# Patient Record
Sex: MALE | Race: WHITE | HISPANIC OR LATINO
[De-identification: names, ages, dates, MRNs, and addresses within clinical notes are randomized per-mention and may not be internally consistent; named-entity substitution may affect disease eponyms.]

---

## 2017-07-24 PROBLEM — Z00.00 ENCOUNTER FOR PREVENTIVE HEALTH EXAMINATION: Status: ACTIVE | Noted: 2017-07-24

## 2017-07-31 ENCOUNTER — APPOINTMENT (OUTPATIENT)
Dept: MRI IMAGING | Facility: HOSPITAL | Age: 52
End: 2017-07-31

## 2017-08-02 ENCOUNTER — APPOINTMENT (OUTPATIENT)
Dept: MRI IMAGING | Facility: HOSPITAL | Age: 52
End: 2017-08-02

## 2018-01-12 ENCOUNTER — RECORD ABSTRACTING (OUTPATIENT)
Age: 53
End: 2018-01-12

## 2018-01-12 DIAGNOSIS — R26.0 ATAXIC GAIT: ICD-10-CM

## 2018-01-12 DIAGNOSIS — Z87.438 PERSONAL HISTORY OF OTHER DISEASES OF MALE GENITAL ORGANS: ICD-10-CM

## 2018-01-12 DIAGNOSIS — Z92.89 PERSONAL HISTORY OF OTHER MEDICAL TREATMENT: ICD-10-CM

## 2018-01-12 DIAGNOSIS — Z87.898 PERSONAL HISTORY OF OTHER SPECIFIED CONDITIONS: ICD-10-CM

## 2018-02-12 ENCOUNTER — APPOINTMENT (OUTPATIENT)
Dept: CARDIOLOGY | Facility: CLINIC | Age: 53
End: 2018-02-12

## 2018-03-20 ENCOUNTER — APPOINTMENT (OUTPATIENT)
Dept: CARDIOLOGY | Facility: CLINIC | Age: 53
End: 2018-03-20
Payer: MEDICAID

## 2018-03-20 VITALS
SYSTOLIC BLOOD PRESSURE: 124 MMHG | HEIGHT: 71 IN | BODY MASS INDEX: 29.96 KG/M2 | HEART RATE: 86 BPM | WEIGHT: 214 LBS | RESPIRATION RATE: 14 BRPM | DIASTOLIC BLOOD PRESSURE: 70 MMHG

## 2018-03-20 PROCEDURE — 99214 OFFICE O/P EST MOD 30 MIN: CPT

## 2018-03-20 PROCEDURE — 93000 ELECTROCARDIOGRAM COMPLETE: CPT

## 2018-04-26 ENCOUNTER — APPOINTMENT (OUTPATIENT)
Dept: ELECTROPHYSIOLOGY | Facility: CLINIC | Age: 53
End: 2018-04-26

## 2018-05-10 ENCOUNTER — APPOINTMENT (OUTPATIENT)
Dept: CARDIOLOGY | Facility: CLINIC | Age: 53
End: 2018-05-10

## 2018-06-27 ENCOUNTER — APPOINTMENT (OUTPATIENT)
Dept: NEUROLOGY | Facility: CLINIC | Age: 53
End: 2018-06-27
Payer: MEDICAID

## 2018-06-27 VITALS
SYSTOLIC BLOOD PRESSURE: 130 MMHG | HEIGHT: 71 IN | DIASTOLIC BLOOD PRESSURE: 88 MMHG | BODY MASS INDEX: 30.1 KG/M2 | WEIGHT: 215 LBS

## 2018-06-27 PROCEDURE — 99204 OFFICE O/P NEW MOD 45 MIN: CPT

## 2018-06-27 RX ORDER — MECLIZINE HYDROCHLORIDE 25 MG/1
TABLET ORAL
Refills: 0 | Status: COMPLETED | COMMUNITY
End: 2018-06-27

## 2018-06-27 RX ORDER — FAMOTIDINE 10 MG/1
TABLET, FILM COATED ORAL
Refills: 0 | Status: COMPLETED | COMMUNITY
End: 2018-06-27

## 2018-07-05 ENCOUNTER — APPOINTMENT (OUTPATIENT)
Dept: CARDIOLOGY | Facility: CLINIC | Age: 53
End: 2018-07-05
Payer: MEDICAID

## 2018-07-05 VITALS
HEART RATE: 69 BPM | BODY MASS INDEX: 30.1 KG/M2 | HEIGHT: 71 IN | DIASTOLIC BLOOD PRESSURE: 60 MMHG | WEIGHT: 215 LBS | SYSTOLIC BLOOD PRESSURE: 106 MMHG

## 2018-07-05 PROCEDURE — 99214 OFFICE O/P EST MOD 30 MIN: CPT

## 2018-07-05 PROCEDURE — 93000 ELECTROCARDIOGRAM COMPLETE: CPT

## 2018-08-16 ENCOUNTER — APPOINTMENT (OUTPATIENT)
Dept: ELECTROPHYSIOLOGY | Facility: CLINIC | Age: 53
End: 2018-08-16

## 2018-08-23 ENCOUNTER — APPOINTMENT (OUTPATIENT)
Dept: NEUROLOGY | Facility: CLINIC | Age: 53
End: 2018-08-23
Payer: MEDICAID

## 2018-08-23 VITALS
WEIGHT: 215 LBS | DIASTOLIC BLOOD PRESSURE: 70 MMHG | BODY MASS INDEX: 30.1 KG/M2 | SYSTOLIC BLOOD PRESSURE: 110 MMHG | HEIGHT: 71 IN

## 2018-08-23 PROCEDURE — 99213 OFFICE O/P EST LOW 20 MIN: CPT

## 2018-08-23 NOTE — DATA REVIEWED
[de-identified] : Brain MRI was performed on 6/18/18 at [facility].\par There were some scattered foci of nonspecific gliosis which were unchanged from a prior study in December of 2017.\par  [de-identified] : EEG was performed in the hospital on 6/18/18.\par The study demonstrated some focal slowing over the left anterior temporal region. There was also spike and wave activity in the left frontotemporal region.\par

## 2018-08-23 NOTE — CONSULT LETTER
[Dear  ___] : Dear  [unfilled], [Courtesy Letter:] : I had the pleasure of seeing your patient, [unfilled], in my office today. [Please see my note below.] : Please see my note below. [Consult Closing:] : Thank you very much for allowing me to participate in the care of this patient.  If you have any questions, please do not hesitate to contact me. [Sincerely,] : Sincerely, [FreeTextEntry3] : Dakota Chawla MD.
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
## 2018-08-23 NOTE — PHYSICAL EXAM
[General Appearance - Alert] : alert [Oriented To Time, Place, And Person] : oriented to person, place, and time [Memory Recent] : recent memory was not impaired [Memory Remote] : remote memory was not impaired [Cranial Nerves Optic (II)] : visual acuity intact bilaterally,  visual fields full to confrontation, pupils equal round and reactive to light [Cranial Nerves Oculomotor (III)] : extraocular motion intact [Cranial Nerves Trigeminal (V)] : facial sensation intact symmetrically [Cranial Nerves Facial (VII)] : face symmetrical [Cranial Nerves Vestibulocochlear (VIII)] : hearing was intact bilaterally [Cranial Nerves Glossopharyngeal (IX)] : tongue and palate midline [Cranial Nerves Accessory (XI - Cranial And Spinal)] : head turning and shoulder shrug symmetric [Cranial Nerves Hypoglossal (XII)] : there was no tongue deviation with protrusion [Motor Tone] : muscle tone was normal in all four extremities [Motor Strength] : muscle strength was normal in all four extremities [Sensation Tactile Decrease] : light touch was intact [Sensation Pain / Temperature Decrease] : pain and temperature was intact [Sensation Vibration Decrease] : vibration was intact [Abnormal Walk] : normal gait [1+] : Patella left 1+ [Aphasia] : no dysphasia/aphasia [Romberg's Sign] : Romberg's sign was negtive [Coordination - Dysmetria Impaired Finger-to-Nose Bilateral] : not present [Plantar Reflex Right Only] : normal on the right [Plantar Reflex Left Only] : normal on the left

## 2018-08-23 NOTE — ASSESSMENT
[FreeTextEntry1] : This is a 53-year-old man who had an episode suggestive of seizure. \par EEG demonstrated a focal spike formation. This was left anterior temporal.\par \par I would agree with maintaining him on antiepileptic medications. I again explained that if he should have further seizures then we may need to change him to another agent.\par \par I will see him back in 3 months.\par \par I have again explained the [place] driving regulations. I have again advised him not to drive.

## 2018-08-31 PROBLEM — Z00.00 ENCOUNTER FOR PREVENTIVE HEALTH EXAMINATION: Noted: 2018-08-31

## 2018-09-20 ENCOUNTER — APPOINTMENT (OUTPATIENT)
Dept: CARDIOLOGY | Facility: CLINIC | Age: 53
End: 2018-09-20

## 2018-10-02 ENCOUNTER — APPOINTMENT (OUTPATIENT)
Dept: CARDIOLOGY | Facility: CLINIC | Age: 53
End: 2018-10-02
Payer: MEDICAID

## 2018-10-02 VITALS
BODY MASS INDEX: 32.06 KG/M2 | SYSTOLIC BLOOD PRESSURE: 114 MMHG | HEIGHT: 71 IN | RESPIRATION RATE: 12 BRPM | HEART RATE: 62 BPM | DIASTOLIC BLOOD PRESSURE: 70 MMHG | WEIGHT: 229 LBS

## 2018-10-02 DIAGNOSIS — Z82.49 FAMILY HISTORY OF ISCHEMIC HEART DISEASE AND OTHER DISEASES OF THE CIRCULATORY SYSTEM: ICD-10-CM

## 2018-10-02 PROCEDURE — 99214 OFFICE O/P EST MOD 30 MIN: CPT

## 2018-10-02 PROCEDURE — 93000 ELECTROCARDIOGRAM COMPLETE: CPT

## 2018-10-10 ENCOUNTER — APPOINTMENT (OUTPATIENT)
Dept: ELECTROPHYSIOLOGY | Facility: CLINIC | Age: 53
End: 2018-10-10
Payer: MEDICAID

## 2018-10-10 ENCOUNTER — APPOINTMENT (OUTPATIENT)
Dept: ELECTROPHYSIOLOGY | Facility: CLINIC | Age: 53
End: 2018-10-10

## 2018-10-10 VITALS
WEIGHT: 277 LBS | DIASTOLIC BLOOD PRESSURE: 77 MMHG | BODY MASS INDEX: 38.78 KG/M2 | HEIGHT: 71 IN | SYSTOLIC BLOOD PRESSURE: 121 MMHG | OXYGEN SATURATION: 97 % | HEART RATE: 71 BPM

## 2018-10-10 DIAGNOSIS — Z56.0 UNEMPLOYMENT, UNSPECIFIED: ICD-10-CM

## 2018-10-10 PROCEDURE — 93000 ELECTROCARDIOGRAM COMPLETE: CPT

## 2018-10-10 RX ORDER — INSULIN ISOPHANE,PORK PURE 100/ML
VIAL (ML) SUBCUTANEOUS
Refills: 0 | Status: DISCONTINUED | COMMUNITY
End: 2018-10-10

## 2018-10-10 SDOH — ECONOMIC STABILITY - INCOME SECURITY: UNEMPLOYMENT, UNSPECIFIED: Z56.0

## 2018-11-26 ENCOUNTER — APPOINTMENT (OUTPATIENT)
Dept: NEUROLOGY | Facility: CLINIC | Age: 53
End: 2018-11-26
Payer: MEDICAID

## 2018-11-26 VITALS
SYSTOLIC BLOOD PRESSURE: 145 MMHG | HEIGHT: 71 IN | BODY MASS INDEX: 38.78 KG/M2 | WEIGHT: 277 LBS | DIASTOLIC BLOOD PRESSURE: 90 MMHG

## 2018-11-26 PROCEDURE — 99213 OFFICE O/P EST LOW 20 MIN: CPT

## 2018-11-26 NOTE — PHYSICAL EXAM
[General Appearance - Alert] : alert [Oriented To Time, Place, And Person] : oriented to person, place, and time [Memory Recent] : recent memory was not impaired [Memory Remote] : remote memory was not impaired [Cranial Nerves Optic (II)] : visual acuity intact bilaterally,  visual fields full to confrontation, pupils equal round and reactive to light [Cranial Nerves Oculomotor (III)] : extraocular motion intact [Cranial Nerves Trigeminal (V)] : facial sensation intact symmetrically [Cranial Nerves Facial (VII)] : face symmetrical [Cranial Nerves Vestibulocochlear (VIII)] : hearing was intact bilaterally [Cranial Nerves Glossopharyngeal (IX)] : tongue and palate midline [Cranial Nerves Accessory (XI - Cranial And Spinal)] : head turning and shoulder shrug symmetric [Cranial Nerves Hypoglossal (XII)] : there was no tongue deviation with protrusion [Motor Tone] : muscle tone was normal in all four extremities [Motor Strength] : muscle strength was normal in all four extremities [Sensation Tactile Decrease] : light touch was intact [Sensation Pain / Temperature Decrease] : pain and temperature was intact [Sensation Vibration Decrease] : vibration was intact [Abnormal Walk] : normal gait [1+] : Patella left 1+ [PERRL With Normal Accommodation] : pupils were equal in size, round, reactive to light, with normal accommodation [Optic Disc Abnormality] : the optic disc were normal in size and color [Edema] : there was no peripheral edema [Involuntary Movements] : no involuntary movements were seen [Dysarthria] : no dysarthria [Aphasia] : no dysphasia/aphasia [Romberg's Sign] : Romberg's sign was negtive [Coordination - Dysmetria Impaired Finger-to-Nose Bilateral] : not present [Plantar Reflex Right Only] : normal on the right [Plantar Reflex Left Only] : normal on the left

## 2018-11-26 NOTE — ASSESSMENT
[FreeTextEntry1] : This is a 53-year-old man who had an episode suggestive of seizure in June of 2018. \par EEG demonstrated a focal spike formation. This was left anterior temporal.\par \par I would agree with maintaining him on antiepileptic medications. I again explained that if he should have further seizures then we may need to change him to another agent.\par \par I will see him back in 4 months.\par \par I have again explained the [place] regulations. I have again advised him not to drive.
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
## 2018-11-26 NOTE — DATA REVIEWED
[de-identified] : Brain MRI was performed on 6/18/18 at [facility].\par There were some scattered foci of nonspecific gliosis which were unchanged from a prior study in December of 2017.\par  [de-identified] : EEG was performed in the hospital on 6/18/18.\par The study demonstrated some focal slowing over the left anterior temporal region. There was also spike and wave activity in the left frontotemporal region.\par

## 2018-12-07 ENCOUNTER — OUTPATIENT (OUTPATIENT)
Dept: OUTPATIENT SERVICES | Facility: HOSPITAL | Age: 53
LOS: 1 days | Discharge: HOME | End: 2018-12-07
Payer: COMMERCIAL

## 2018-12-07 ENCOUNTER — TRANSCRIPTION ENCOUNTER (OUTPATIENT)
Age: 53
End: 2018-12-07

## 2018-12-07 VITALS
OXYGEN SATURATION: 98 % | HEART RATE: 66 BPM | RESPIRATION RATE: 20 BRPM | SYSTOLIC BLOOD PRESSURE: 164 MMHG | DIASTOLIC BLOOD PRESSURE: 77 MMHG

## 2018-12-07 VITALS
SYSTOLIC BLOOD PRESSURE: 138 MMHG | DIASTOLIC BLOOD PRESSURE: 70 MMHG | HEART RATE: 70 BPM | TEMPERATURE: 98 F | OXYGEN SATURATION: 99 % | RESPIRATION RATE: 18 BRPM

## 2018-12-07 DIAGNOSIS — I42.2 OTHER HYPERTROPHIC CARDIOMYOPATHY: ICD-10-CM

## 2018-12-07 PROCEDURE — 33282: CPT

## 2018-12-07 PROCEDURE — C1764: CPT

## 2018-12-07 RX ORDER — INSULIN DEGLUDEC 100 U/ML
42 INJECTION, SOLUTION SUBCUTANEOUS
Qty: 0 | Refills: 0 | COMMUNITY

## 2018-12-07 RX ORDER — CEPHALEXIN 500 MG
500 CAPSULE ORAL ONCE
Qty: 0 | Refills: 0 | Status: COMPLETED | OUTPATIENT
Start: 2018-12-07 | End: 2018-12-07

## 2018-12-07 RX ORDER — INSULIN ASPART 100 [IU]/ML
0 INJECTION, SOLUTION SUBCUTANEOUS
Qty: 0 | Refills: 0 | COMMUNITY

## 2018-12-07 RX ORDER — INSULIN DEGLUDEC 100 U/ML
0 INJECTION, SOLUTION SUBCUTANEOUS
Qty: 0 | Refills: 0 | COMMUNITY

## 2018-12-07 RX ADMIN — Medication 500 MILLIGRAM(S): at 08:12

## 2018-12-07 NOTE — H&P PST ADULT - RS GEN PE MLT RESP DETAILS PC
good air movement/clear to auscultation bilaterally/respirations non-labored/airway patent/breath sounds equal/no chest wall tenderness

## 2018-12-07 NOTE — DISCHARGE NOTE ADULT - CARE PLAN
Principal Discharge DX:	Status post placement of implantable loop recorder  Goal:	s/p Medtronic loop recorder implant  Assessment and plan of treatment:	Loop Recorder Incision Care:     - Remove the plastic and gauze dressing after 24 hours.   - Do not touch the incision until it is completely healed.   - There is Dermabond (skin glue) on your incision, which will start to flake off on its own over the next 2-3 weeks. Do not pick at or peal off the Dermabond.   - Do not apply soaps, creams, lotions, ointments or powders to the incision until it is completely healed.  - You should call the doctor if you notice redness, drainage, swelling, increased tenderness, hot sensation around the  incision, bleeding or incision edges pulling apart.

## 2018-12-07 NOTE — DISCHARGE NOTE ADULT - PLAN OF CARE
s/p MedVakast loop recorder implant Loop Recorder Incision Care:     - Remove the plastic and gauze dressing after 24 hours.   - Do not touch the incision until it is completely healed.   - There is Dermabond (skin glue) on your incision, which will start to flake off on its own over the next 2-3 weeks. Do not pick at or peal off the Dermabond.   - Do not apply soaps, creams, lotions, ointments or powders to the incision until it is completely healed.  - You should call the doctor if you notice redness, drainage, swelling, increased tenderness, hot sensation around the  incision, bleeding or incision edges pulling apart.

## 2018-12-07 NOTE — DISCHARGE NOTE ADULT - CARE PROVIDER_API CALL
Lisandro Johansen (MD; MPH), Cardiac Electrophysiology; Cardiovascular Disease; Internal Medicine  [address] [place] [address] [id number]  Phone: [phone]  Fax: [phone]

## 2018-12-07 NOTE — H&P PST ADULT - NSSUBSTANCEUSE_GEN_ALL_CORE_SD
[address] [place] [address] [address]  [phone]      Cardiology Progress Note      7/27/2017 3PM    Admit Date: 7/24/2017    Admit Diagnosis:   CVA (cerebral vascular accident) [facility])    Subjective:     Jihan Zaman is a 82 y.o. male with PMH HLD, DM, HTN, CKD, PAF, sHF, SSS s/p pacemaker, SVT s/p ablation, subdural hematoma who was admitted for CVA (cerebral vascular accident) ([address] Overnight events:  -remains in rapid a-fib; decreasing oxygen saturations  -labs steady   -Mr. Sarabjit Kelly has his eyes open today, but is not following commands or speaking.       Visit Vitals    /90    Pulse (!) 122    Temp 97.7 °F (36.5 °C)    Resp 24    Ht 5' 9\" (1.753 m)    Wt 65.5 kg (144 lb 6.4 oz)    SpO2 (!) 79%    BMI 21.32 kg/m2       Current Facility-Administered Medications   Medication Dose Route Frequency    furosemide (LASIX) injection 80 mg  80 mg IntraVENous Q12H    dextrose 5 % - 0.45% NaCl infusion  25 mL/hr IntraVENous CONTINUOUS    amiodarone (CORDARONE) 300 mg in dextrose 5% 250 mL infusion  1 mg/min IntraVENous CONTINUOUS    Followed by   Paco Prior amiodarone (CORDARONE) 300 mg in dextrose 5% 250 mL infusion  0.5 mg/min IntraVENous CONTINUOUS    aspirin (ASA) suppository 300 mg  300 mg Rectal DAILY    piperacillin-tazobactam (ZOSYN) 4.5 g in 0.9% sodium chloride (MBP/ADV) 100 mL  4.5 g IntraVENous Q12H    haloperidol lactate (HALDOL) injection 4 mg  4 mg IntraVENous Q4H PRN    labetalol (NORMODYNE;TRANDATE) injection 10 mg  10 mg IntraVENous Q2H PRN    albuterol-ipratropium (DUO-NEB) 2.5 MG-0.5 MG/3 ML  3 mL Nebulization Q2H PRN    glucose chewable tablet 16 g  4 Tab Oral PRN    glucagon (GLUCAGEN) injection 1 mg  1 mg IntraMUSCular PRN    sodium chloride (NS) flush 5-10 mL  5-10 mL IntraVENous Q8H    sodium chloride (NS) flush 5-10 mL  5-10 mL IntraVENous PRN    sodium chloride (NS) flush 5-10 mL  5-10 mL IntraVENous PRN    dextrose 10% infusion 125-250 mL  125-250 mL IntraVENous PRN    mupirocin (BACTROBAN) 2 % ointment   Both Nostrils BID    albuterol-ipratropium (DUO-NEB) 2.5 MG-0.5 MG/3 ML  3 mL Nebulization QID RT    insulin lispro (HUMALOG) injection   SubCUTAneous Q6H    sodium chloride (NS) flush 5-10 mL  5-10 mL IntraVENous PRN    sodium chloride (NS) flush 5 mL  5 mL IntraVENous PRN       Objective:      Physical Exam:  General: elderly AAM resting in bed in NAD with eyes open, but not responding to questions or commands appears in distress  Heart: RRR, no m/S3/JVD  Lungs: coarse rhonchi bilaterally;  Labored respirations   Abdomen: Soft, +BS, NTND   Extremities: LE js +DP/PT, no edema to BLE. Trace edema LUE   Neurologic: minimal response. Moves LUE and LLE spontaneously. No movement noted to RUE. Skin:  Warm and dry.     Data Review:   Recent Labs      07/26/17   0444  07/24/17 2011   WBC  14.7*  7.4   HGB  13.2  13.3   HCT  41.2  41.4   PLT  148*  182     Recent Labs      07/27/17   0432  07/26/17   0444  07/24/17 2011   NA  143  144  140   K  3.5  3.8  3.7   CL  112*  110*  104   CO2  19*  23  29   GLU  83  90  99   BUN  43*  33*  30*   CREA  2.29*  2.34*  2.10*   CA  8.5  8.4*  8.7   PHOS   --   3.4   --    ALB   --    --   3.3*   TBILI   --    --   0.4   SGOT   --    --   41*   ALT   --    --   43   INR   --    --   1.1       Recent Labs      07/24/17 2011   TROIQ  0.09*         Intake/Output Summary (Last 24 hours) at 07/27/17 1537  Last data filed at 07/27/17 1456   Gross per 24 hour   Intake          2362.92 ml   Output              445 ml   Net          1917.92 ml        Telemetry: rapid a-fib; Some pacing overnight. ? Bursts of flutter  ECG: AV pacing with ? PVCs  CXRAY: (7/24) no acute process                (7/25) \"New mild pulmonary edema with right pleural effusion. \"  CT head: (7/26): \"Evolution of large left anterior and middle cerebral territory  infarcts. No hemorrhagic transformation.  Mild mass effect on the ventricular  system without midline shift. No new areas of infarction. \"    Assessment:     Active Problems:    PAF (paroxysmal atrial fibrillation) ([facility] (8/6/2013)      CVA (cerebral vascular accident) ([facility] (7/24/2017)      Aspiration into airway (7/25/2017)      Weakness (7/26/2017)      Debility (7/26/2017)      Counseling regarding advanced care planning and goals of care (7/26/2017)        Plan:     PAF: with pacemaker. Remains in rapid a-fib with rates up to 150s this morning. ? Bursts of flutter. Pacemaker interrogation showed a -fib from 7/17 until 7/23. TPA would have dissolved any remaining clots. Interrogation showed no signs of WCT. Was not started on [address] [place] [address] Road out-patient due to history of subdural and advanced age/fall risk. CHADSVASC=7. HASBLED=3.    · Repeated amio bolus and higher rate gtt this morning to help with rate control. · Patient's labored respirations and restlessness likely triggering faster heart rates  · ASA supp. x 1 week per neuro rec. Then he can be AC. · BP and HF meds on hold for hypotension  · Aspiration per hospitalist and intensivist      Palliative meeting yesterday noted. Patient with very poor prognosis.         Frank Cantu NP  DNP, RN, AGACNP-BC never used

## 2018-12-07 NOTE — H&P PST ADULT - NS PRO FEM  PAP SMEARS 3YRS
[facility] EMERGENCY DEPT 
[address] [facility] [address] [id number] 
[phone] Work/School Note Date: 9/4/2018 To Whom It May concern: 
 
Camilla Land was seen and treated today in the emergency room by the following provider(s): 
Attending Provider: Viktor Centeno MD.   
 
Camilla Land may return to work on 9/5/2018.  
 
Sincerely, 
 
 
 
 
Viktor Centeno MD 
 
 
 

not applicable (Male)

## 2018-12-07 NOTE — DISCHARGE NOTE ADULT - HOSPITAL COURSE
53 year old male patient with a history of seizure disorder, HCM( IVS 1.5cm) , DM-2, HTN, LVOT obstruction, ataxic gait. Patient now s/p Medtronic loop recorder implantation.

## 2018-12-07 NOTE — H&P PST ADULT - HISTORY OF PRESENT ILLNESS
53 year old male patient with a history of seizure disorder, HCM( IVS 1.5cm) , DM-2, HTN, LVOT obstruction, ataxic gait. Patient referred for loop recorder implantation for long-term arrhythmia surveillance including occult AF and NSVT, Patient denies life-long syncope or FH of SCD.

## 2018-12-07 NOTE — DISCHARGE NOTE ADULT - CARE PROVIDERS DIRECT ADDRESSES
[email or web address]

## 2018-12-07 NOTE — DISCHARGE NOTE ADULT - PATIENT PORTAL LINK FT
You can access the [email or web address], offered by [facility], by registering with the following website: [email or web address]

## 2018-12-07 NOTE — DISCHARGE NOTE ADULT - MEDICATION SUMMARY - MEDICATIONS TO TAKE
I will START or STAY ON the medications listed below when I get home from the hospital:    aspirin 81 mg oral tablet  -- 1 tab(s) by mouth once a day  -- Indication: For CAD     Motrin 600 mg oral tablet  -- 1 tab(s) by mouth every 6 hours, As Needed  -- Indication: For pain    divalproex sodium 500 mg oral delayed release tablet  -- 1 tab(s) by mouth 2 times a day  -- Indication: For CNS    Tresiba FlexTouch 100 units/mL subcutaneous solution  -- 42 unit(s) subcutaneous once a day (at bedtime)  -- Indication: For DM    NovoLOG FlexPen 100 units/mL injectable solution  -- injectable 3 times a day  -- Indication: For DM    atorvastatin 10 mg oral tablet  -- 1 tab(s) by mouth once a day (at bedtime)  -- Indication: For HLD    Metoprolol Tartrate 50 mg oral tablet  -- 1 tab(s) by mouth 2 times a day  -- Indication: For HTN

## 2018-12-07 NOTE — H&P PST ADULT - ASSESSMENT
53 year old male patient with a history of seizure disorder, HCM( IVS 1.5cm) , DM-2, HTN, LVOT obstruction, ataxic gait. Patient referred for loop recorder implantation.

## 2018-12-12 DIAGNOSIS — R55 SYNCOPE AND COLLAPSE: ICD-10-CM

## 2018-12-26 ENCOUNTER — APPOINTMENT (OUTPATIENT)
Dept: ELECTROPHYSIOLOGY | Facility: CLINIC | Age: 53
End: 2018-12-26
Payer: MEDICAID

## 2018-12-26 VITALS
DIASTOLIC BLOOD PRESSURE: 93 MMHG | OXYGEN SATURATION: 95 % | BODY MASS INDEX: 29.4 KG/M2 | HEART RATE: 87 BPM | WEIGHT: 210 LBS | SYSTOLIC BLOOD PRESSURE: 146 MMHG | HEIGHT: 71 IN

## 2018-12-26 PROCEDURE — 99024 POSTOP FOLLOW-UP VISIT: CPT

## 2018-12-26 PROCEDURE — 93285 PRGRMG DEV EVAL SCRMS IP: CPT

## 2018-12-26 NOTE — PHYSICAL EXAM
[Clean] : clean [Dry] : dry [Well-Healed] : well-healed [Erythema] : not erythematous [Warm] : not warm [Tender] : not tender [Indurated] : not indurated [FreeTextEntry1] : parasternal

## 2018-12-26 NOTE — DISCUSSION/SUMMARY
[Patient] : the patient [FreeTextEntry1] : \par Implant site is healing well and WNL\par No events.  NO AF, alessandro or tachyarrhythmias noted\par \par Home transmitter and monitoring process reviewed at length\par Cardiology f/up with Dr. Tran\par Will continue to monitor remotely\par EP f/up based on ILR findings or as requested by Dr. Tran\par \par Dolly Castro PAC

## 2018-12-26 NOTE — PROCEDURE
[No] : not [NSR] : normal sinus rhythm [See Device Printout] : See device printout [Medtronic] : Medtronic [None] : none [de-identified] : Reveal LINQ [de-identified] : [id number] [de-identified] : 12/7/2018 [de-identified] : NO events

## 2018-12-26 NOTE — HISTORY OF PRESENT ILLNESS
[de-identified] : 54 y/o M with Seizure disorder, HCM( IVS 1.5cm) , DM-2, HTN, LVOT obstruction , Ataxic gait. patient referred for iLR for long-term arrhythmia surveillance including occult AF and NSVT .\par Patient denies life-long syncope or FH of SCD.\par \par Presents for post implant device and wound check.\par Denies recent symptoms including syncope, dizziness, or palpitations.\par No recurrent seizure activity since ILR implant

## 2019-01-07 ENCOUNTER — APPOINTMENT (OUTPATIENT)
Dept: ELECTROPHYSIOLOGY | Facility: CLINIC | Age: 54
End: 2019-01-07
Payer: MEDICAID

## 2019-01-07 PROCEDURE — 93299: CPT

## 2019-01-07 PROCEDURE — 93298 REM INTERROG DEV EVAL SCRMS: CPT

## 2019-01-10 ENCOUNTER — APPOINTMENT (OUTPATIENT)
Dept: CARDIOLOGY | Facility: CLINIC | Age: 54
End: 2019-01-10
Payer: MEDICAID

## 2019-01-10 VITALS
SYSTOLIC BLOOD PRESSURE: 147 MMHG | HEIGHT: 71 IN | DIASTOLIC BLOOD PRESSURE: 89 MMHG | RESPIRATION RATE: 14 BRPM | HEART RATE: 86 BPM | WEIGHT: 230 LBS | BODY MASS INDEX: 32.2 KG/M2 | OXYGEN SATURATION: 99 %

## 2019-01-10 VITALS — SYSTOLIC BLOOD PRESSURE: 136 MMHG | DIASTOLIC BLOOD PRESSURE: 80 MMHG

## 2019-01-10 PROCEDURE — 93000 ELECTROCARDIOGRAM COMPLETE: CPT

## 2019-01-10 PROCEDURE — 99214 OFFICE O/P EST MOD 30 MIN: CPT

## 2019-01-10 RX ORDER — METOPROLOL TARTRATE 50 MG/1
50 TABLET, FILM COATED ORAL
Qty: 180 | Refills: 1 | Status: DISCONTINUED | COMMUNITY
End: 2019-01-10

## 2019-01-10 NOTE — HISTORY OF PRESENT ILLNESS
[FreeTextEntry1] : Patient comes back to the office today offering no new complaints. He had his loop recorder placed without issue and there have been no events. He tells me that he has been taking metoprolol at 75 mg b.i.d. since the summer. His sister here today is more attuned with his medication regimen. Patient denies chest pain, shortness of breath, palpitations, orthopnea, presyncope, syncope.

## 2019-01-10 NOTE — PHYSICAL EXAM
[General Appearance - In No Acute Distress] : no acute distress [Normal Conjunctiva] : the conjunctiva exhibited no abnormalities [Normal Oral Mucosa] : normal oral mucosa [Normal Oropharynx] : normal oropharynx [Auscultation Breath Sounds / Voice Sounds] : lungs were clear to auscultation bilaterally [Normal Rate] : normal [Rhythm Regular] : regular [Normal S1] : normal S1 [Normal S2] : normal S2 [S4] : an S4 was heard [III] : a grade 3 [Abdomen Soft] : soft [Abdomen Tenderness] : non-tender [Abnormal Walk] : normal gait [Nail Clubbing] : no clubbing of the fingernails [Cyanosis, Localized] : no localized cyanosis [Skin Color & Pigmentation] : normal skin color and pigmentation [Oriented To Time, Place, And Person] : oriented to person, place, and time [Affect] : the affect was normal [FreeTextEntry1] : No JVD, no carotid bruits. [S3] : no S3

## 2019-01-10 NOTE — DISCUSSION/SUMMARY
[FreeTextEntry1] : 1. Will start monitoring his loop recorder in this office.\par 2. Continue current cardiac meds in doses as noted above for hypertrophic cardiomyopathy and hypertension and dyslipidemia.\par 3. Follow up with primary doctor for treatment of diabetes.\par 4. Monitor BP at home, keep a log and bring to f/u.\par 5. No additional cardiac testing at this time.\par 6. Patient encouraged to work on diet to lose weight.\par 7. Patient is encouraged to exercise at least 30 minutes a day everyday of the week.\par 8. Repeat blood work.\par 9. Follow up here in three months.\par

## 2019-01-10 NOTE — ASSESSMENT
[FreeTextEntry1] : EKG: Sinus rhythm with first degree AV block. No significant ST or T wave changes. Inferior Q waves noted. Delayed R-wave progression. Unchanged from prior.\par \par 54-year-old man with a past medical history of hypertrophic cardiomyopathy, hypertension, diabetes who presents to me for followup evaluation. Patient has had no symptoms or any recurrence of episodes of syncope versus seizure. He did have his loop recorder placed successfully and there have been no events. He continues to not make reference with regards to diet exercise or with losing weight. Blood pressure is also a little bit borderline today but he should be checking it more closely at home. He has no evidence of heart failure.

## 2019-02-07 ENCOUNTER — APPOINTMENT (OUTPATIENT)
Dept: CARDIOLOGY | Facility: CLINIC | Age: 54
End: 2019-02-07
Payer: MEDICAID

## 2019-02-07 PROCEDURE — 93299: CPT

## 2019-02-07 PROCEDURE — 93298 REM INTERROG DEV EVAL SCRMS: CPT

## 2019-02-08 ENCOUNTER — APPOINTMENT (OUTPATIENT)
Dept: ELECTROPHYSIOLOGY | Facility: CLINIC | Age: 54
End: 2019-02-08

## 2019-03-11 ENCOUNTER — APPOINTMENT (OUTPATIENT)
Dept: CARDIOLOGY | Facility: CLINIC | Age: 54
End: 2019-03-11
Payer: MEDICAID

## 2019-03-11 ENCOUNTER — APPOINTMENT (OUTPATIENT)
Dept: ELECTROPHYSIOLOGY | Facility: CLINIC | Age: 54
End: 2019-03-11

## 2019-03-11 PROCEDURE — 93298 REM INTERROG DEV EVAL SCRMS: CPT

## 2019-03-11 PROCEDURE — 93299: CPT

## 2019-03-27 ENCOUNTER — APPOINTMENT (OUTPATIENT)
Dept: NEUROLOGY | Facility: CLINIC | Age: 54
End: 2019-03-27
Payer: MEDICAID

## 2019-03-27 VITALS
HEIGHT: 71 IN | DIASTOLIC BLOOD PRESSURE: 68 MMHG | WEIGHT: 230 LBS | SYSTOLIC BLOOD PRESSURE: 102 MMHG | BODY MASS INDEX: 32.2 KG/M2

## 2019-03-27 PROCEDURE — 99213 OFFICE O/P EST LOW 20 MIN: CPT
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
## 2019-03-27 NOTE — ASSESSMENT
[FreeTextEntry1] : This is a 54 year-old man who had an episode suggestive of seizure in June of 2018. \par EEG demonstrated a focal spike formation. This was left anterior temporal.\par \par I would agree with maintaining him on antiepileptic medications. I again explained that if he should have further seizures then we may need to change him to another agent.\par \par I will see him back in 4 months.\par \par I have again explained the [place] driving regulations. I have again advised him not to drive.

## 2019-03-27 NOTE — DATA REVIEWED
[de-identified] : Brain MRI was performed on 6/18/18 at [facility].\par There were some scattered foci of nonspecific gliosis which were unchanged from a prior study in December of 2017.\par  [de-identified] : EEG was performed in the hospital on 6/18/18.\par The study demonstrated some focal slowing over the left anterior temporal region. There was also spike and wave activity in the left frontotemporal region.\par

## 2019-03-27 NOTE — PHYSICAL EXAM
[General Appearance - Alert] : alert [General Appearance - In No Acute Distress] : in no acute distress [Oriented To Time, Place, And Person] : oriented to person, place, and time [Memory Recent] : recent memory was not impaired [Memory Remote] : remote memory was not impaired [Cranial Nerves Optic (II)] : visual acuity intact bilaterally,  visual fields full to confrontation, pupils equal round and reactive to light [Cranial Nerves Oculomotor (III)] : extraocular motion intact [Cranial Nerves Trigeminal (V)] : facial sensation intact symmetrically [Cranial Nerves Facial (VII)] : face symmetrical [Cranial Nerves Vestibulocochlear (VIII)] : hearing was intact bilaterally [Cranial Nerves Glossopharyngeal (IX)] : tongue and palate midline [Cranial Nerves Accessory (XI - Cranial And Spinal)] : head turning and shoulder shrug symmetric [Cranial Nerves Hypoglossal (XII)] : there was no tongue deviation with protrusion [Motor Tone] : muscle tone was normal in all four extremities [Motor Strength] : muscle strength was normal in all four extremities [Sensation Tactile Decrease] : light touch was intact [Sensation Pain / Temperature Decrease] : pain and temperature was intact [Sensation Vibration Decrease] : vibration was intact [Abnormal Walk] : normal gait [1+] : Patella left 1+ [PERRL With Normal Accommodation] : pupils were equal in size, round, reactive to light, with normal accommodation [Optic Disc Abnormality] : the optic disc were normal in size and color [Edema] : there was no peripheral edema [Involuntary Movements] : no involuntary movements were seen [Dysarthria] : no dysarthria [Aphasia] : no dysphasia/aphasia [Romberg's Sign] : Romberg's sign was negtive [Coordination - Dysmetria Impaired Finger-to-Nose Bilateral] : not present [Plantar Reflex Right Only] : normal on the right [Plantar Reflex Left Only] : normal on the left

## 2019-04-10 ENCOUNTER — APPOINTMENT (OUTPATIENT)
Dept: CARDIOLOGY | Facility: CLINIC | Age: 54
End: 2019-04-10
Payer: MEDICAID

## 2019-04-10 PROCEDURE — 93298 REM INTERROG DEV EVAL SCRMS: CPT

## 2019-04-10 PROCEDURE — 93299: CPT

## 2019-04-12 ENCOUNTER — APPOINTMENT (OUTPATIENT)
Dept: ELECTROPHYSIOLOGY | Facility: CLINIC | Age: 54
End: 2019-04-12

## 2019-04-18 ENCOUNTER — APPOINTMENT (OUTPATIENT)
Dept: CARDIOLOGY | Facility: CLINIC | Age: 54
End: 2019-04-18
Payer: MEDICAID

## 2019-04-18 ENCOUNTER — NON-APPOINTMENT (OUTPATIENT)
Age: 54
End: 2019-04-18

## 2019-04-18 VITALS
DIASTOLIC BLOOD PRESSURE: 100 MMHG | RESPIRATION RATE: 14 BRPM | BODY MASS INDEX: 31.64 KG/M2 | OXYGEN SATURATION: 99 % | HEIGHT: 71 IN | SYSTOLIC BLOOD PRESSURE: 162 MMHG | HEART RATE: 72 BPM | WEIGHT: 226 LBS

## 2019-04-18 PROCEDURE — 93000 ELECTROCARDIOGRAM COMPLETE: CPT

## 2019-04-18 PROCEDURE — 99214 OFFICE O/P EST MOD 30 MIN: CPT

## 2019-04-18 NOTE — ASSESSMENT
[FreeTextEntry1] : EKG: Sinus rhythm with first degree AV block. No significant ST or T wave changes. Anteroseptal Q waves noted. \par \par 54-year-old man with a past medical history of hypertrophic cardiomyopathy, hypertension, diabetes who presents to me for followup evaluation. Patient has had no symptoms or any recurrence of episodes of syncope.  Loop recorder continues to show no events. Unfortunately patient has been intermittently not taking his medications and his blood pressure is more elevated today. It is unclear if he needs additional medication for hypertension. Recent laboratory work shows good control his LDL but very elevated triglycerides and hemoglobin A1c. He does eat a lot of rice and carbohydrates and we discussed the need to cut that down significantly.

## 2019-04-18 NOTE — DISCUSSION/SUMMARY
[FreeTextEntry1] : 1. Continue current cardiac meds in doses as noted above for hypertrophic cardiomyopathy and hypertension and dyslipidemia. The importance of compliance is reinforced strongly today.\par 2. Follow up with primary doctor for treatment of diabetes.\par 3. Monitor BP at home, keep a log and bring to f/u. We'll determine from that need for additional treatment for hypertension.\par 4. No additional cardiac testing at this time.\par 5. Patient encouraged to work on diet to lose weight.  Encouraged to severely curtail carbs given elevated TG and A1C.  Patient has been eating rice 3x/day.\par 6. Patient is encouraged to exercise at least 30 minutes a day everyday of the week.\par 7. F/u with endocrinology.\par 8. Repeat blood work to reevaluate elevated creatinine. Patient encouraged to adequately hydrate.\par 9. Follow up here in 2 months.\par

## 2019-04-18 NOTE — HISTORY OF PRESENT ILLNESS
[FreeTextEntry1] : Patient returns to office today feeling well and offering no complaints. He admits that he intermittently skips his medications and did not take them yesterday. He says he feels it is just a lot of medication. He does not appear to have any specific side effects. Patient denies chest pain, shortness of breath, palpitations, orthopnea, presyncope, syncope.

## 2019-05-13 ENCOUNTER — APPOINTMENT (OUTPATIENT)
Dept: CARDIOLOGY | Facility: CLINIC | Age: 54
End: 2019-05-13
Payer: MEDICAID

## 2019-05-13 ENCOUNTER — APPOINTMENT (OUTPATIENT)
Dept: ELECTROPHYSIOLOGY | Facility: CLINIC | Age: 54
End: 2019-05-13

## 2019-05-13 PROCEDURE — 93298 REM INTERROG DEV EVAL SCRMS: CPT

## 2019-05-13 PROCEDURE — 93299: CPT

## 2019-05-29 ENCOUNTER — APPOINTMENT (OUTPATIENT)
Dept: NEUROLOGY | Facility: CLINIC | Age: 54
End: 2019-05-29
Payer: MEDICAID

## 2019-05-29 VITALS
SYSTOLIC BLOOD PRESSURE: 200 MMHG | HEIGHT: 71 IN | DIASTOLIC BLOOD PRESSURE: 108 MMHG | BODY MASS INDEX: 33.32 KG/M2 | WEIGHT: 238 LBS

## 2019-05-29 PROCEDURE — 99214 OFFICE O/P EST MOD 30 MIN: CPT

## 2019-05-29 NOTE — PHYSICAL EXAM
[General Appearance - Alert] : alert [General Appearance - In No Acute Distress] : in no acute distress [Memory Remote] : remote memory was not impaired [Memory Recent] : recent memory was not impaired [Oriented To Time, Place, And Person] : oriented to person, place, and time [Cranial Nerves Oculomotor (III)] : extraocular motion intact [Cranial Nerves Optic (II)] : visual acuity intact bilaterally,  visual fields full to confrontation, pupils equal round and reactive to light [Cranial Nerves Trigeminal (V)] : facial sensation intact symmetrically [Cranial Nerves Glossopharyngeal (IX)] : tongue and palate midline [Cranial Nerves Vestibulocochlear (VIII)] : hearing was intact bilaterally [Cranial Nerves Facial (VII)] : face symmetrical [Cranial Nerves Accessory (XI - Cranial And Spinal)] : head turning and shoulder shrug symmetric [Cranial Nerves Hypoglossal (XII)] : there was no tongue deviation with protrusion [Motor Tone] : muscle tone was normal in all four extremities [Sensation Tactile Decrease] : light touch was intact [Motor Strength] : muscle strength was normal in all four extremities [Sensation Pain / Temperature Decrease] : pain and temperature was intact [Sensation Vibration Decrease] : vibration was intact [Abnormal Walk] : normal gait [1+] : Patella left 1+ [PERRL With Normal Accommodation] : pupils were equal in size, round, reactive to light, with normal accommodation [Optic Disc Abnormality] : the optic disc were normal in size and color [Edema] : there was no peripheral edema [Involuntary Movements] : no involuntary movements were seen [Dysarthria] : no dysarthria [Coordination - Dysmetria Impaired Finger-to-Nose Bilateral] : not present [Aphasia] : no dysphasia/aphasia [Romberg's Sign] : Romberg's sign was negtive [Plantar Reflex Left Only] : normal on the left [Plantar Reflex Right Only] : normal on the right
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
## 2019-05-29 NOTE — CONSULT LETTER
[Dear  ___] : Dear  [unfilled], [Courtesy Letter:] : I had the pleasure of seeing your patient, [unfilled], in my office today. [Consult Closing:] : Thank you very much for allowing me to participate in the care of this patient.  If you have any questions, please do not hesitate to contact me. [Please see my note below.] : Please see my note below. [Sincerely,] : Sincerely, [FreeTextEntry3] : Dakota Chawla MD.

## 2019-05-29 NOTE — ASSESSMENT
[FreeTextEntry1] : This is a 54 year-old man who had an episode suggestive of seizure in June of 2018. \par EEG demonstrated a focal spike formation. This was left anterior temporal.\par \par I would agree with maintaining him on antiepileptic medications. I again explained that if he should have further seizures then we may need to change him to another agent.\par \par His blood pressure was high today. I advised him to discuss this with you further.\par \par I will see him back in 4 months.\par I have again explained the [place] driving regulations. I have again advised him not to drive.

## 2019-05-29 NOTE — DATA REVIEWED
[de-identified] : Brain MRI was performed on 6/18/18 at [facility].\par There were some scattered foci of nonspecific gliosis which were unchanged from a prior study in December of 2017.\par  [de-identified] : EEG was performed in the hospital on 6/18/18.\par The study demonstrated some focal slowing over the left anterior temporal region. There was also spike and wave activity in the left frontotemporal region.\par

## 2019-06-12 ENCOUNTER — APPOINTMENT (OUTPATIENT)
Dept: CARDIOLOGY | Facility: CLINIC | Age: 54
End: 2019-06-12
Payer: MEDICAID

## 2019-06-12 PROCEDURE — 93298 REM INTERROG DEV EVAL SCRMS: CPT

## 2019-06-12 PROCEDURE — 93299: CPT

## 2019-06-14 ENCOUNTER — APPOINTMENT (OUTPATIENT)
Dept: ELECTROPHYSIOLOGY | Facility: CLINIC | Age: 54
End: 2019-06-14

## 2019-06-24 ENCOUNTER — APPOINTMENT (OUTPATIENT)
Dept: CARDIOLOGY | Facility: CLINIC | Age: 54
End: 2019-06-24

## 2019-07-15 ENCOUNTER — APPOINTMENT (OUTPATIENT)
Dept: CARDIOLOGY | Facility: CLINIC | Age: 54
End: 2019-07-15
Payer: MEDICAID

## 2019-07-15 PROCEDURE — 93299: CPT

## 2019-07-15 PROCEDURE — 93298 REM INTERROG DEV EVAL SCRMS: CPT

## 2019-08-13 ENCOUNTER — MEDICATION RENEWAL (OUTPATIENT)
Age: 54
End: 2019-08-13

## 2019-08-15 ENCOUNTER — APPOINTMENT (OUTPATIENT)
Dept: CARDIOLOGY | Facility: CLINIC | Age: 54
End: 2019-08-15
Payer: MEDICAID

## 2019-08-15 PROCEDURE — 93299: CPT

## 2019-08-15 PROCEDURE — 93298 REM INTERROG DEV EVAL SCRMS: CPT

## 2019-08-16 ENCOUNTER — NON-APPOINTMENT (OUTPATIENT)
Age: 54
End: 2019-08-16

## 2019-08-16 ENCOUNTER — APPOINTMENT (OUTPATIENT)
Dept: CARDIOLOGY | Facility: CLINIC | Age: 54
End: 2019-08-16
Payer: MEDICAID

## 2019-08-16 VITALS
HEIGHT: 71 IN | BODY MASS INDEX: 33.46 KG/M2 | SYSTOLIC BLOOD PRESSURE: 149 MMHG | OXYGEN SATURATION: 98 % | HEART RATE: 65 BPM | WEIGHT: 239 LBS | DIASTOLIC BLOOD PRESSURE: 88 MMHG | RESPIRATION RATE: 14 BRPM

## 2019-08-16 PROCEDURE — 99214 OFFICE O/P EST MOD 30 MIN: CPT

## 2019-08-16 PROCEDURE — 93000 ELECTROCARDIOGRAM COMPLETE: CPT

## 2019-08-16 NOTE — DISCUSSION/SUMMARY
[FreeTextEntry1] : 1. Add telmisartan 20 mg daily for hypertension.\par 2. Continue other current cardiac meds in doses as noted above for hypertrophic cardiomyopathy and hypertension and dyslipidemia. The importance of compliance is reinforced strongly today.\par 3. Follow up with primary doctor for treatment of diabetes.\par 4. Monitor BP at home, keep a log and bring to f/u. \par 5. No additional cardiac testing at this time.\par 6. Patient encouraged to work on diet to lose weight.  Encouraged to severely curtail carbs given elevated TG and A1C.  \par 7. Patient is encouraged to exercise at least 30 minutes a day everyday of the week.\par 8. Follow up here in 2 months.\par

## 2019-08-16 NOTE — PHYSICAL EXAM
[General Appearance - In No Acute Distress] : no acute distress [Normal Conjunctiva] : the conjunctiva exhibited no abnormalities [Normal Oral Mucosa] : normal oral mucosa [Normal Oropharynx] : normal oropharynx [Auscultation Breath Sounds / Voice Sounds] : lungs were clear to auscultation bilaterally [Abdomen Soft] : soft [Abnormal Walk] : normal gait [Abdomen Tenderness] : non-tender [Nail Clubbing] : no clubbing of the fingernails [Cyanosis, Localized] : no localized cyanosis [Skin Color & Pigmentation] : normal skin color and pigmentation [Oriented To Time, Place, And Person] : oriented to person, place, and time [Affect] : the affect was normal [Normal Rate] : normal [Rhythm Regular] : regular [Normal S1] : normal S1 [Normal S2] : normal S2 [S4] : an S4 was heard [III] : a grade 3 [S3] : no S3 [FreeTextEntry1] : Tr-1+ b/l LE edema

## 2019-08-16 NOTE — HISTORY OF PRESENT ILLNESS
[FreeTextEntry1] : Patient comes back to the office today offering no complaints. He states he is taking all his medication on a daily basis at this point. He has not been checking his blood pressure at home. He continues to have a poor diet but is trying to do better with cutting out carbs. He does some exercise without any limitation physically. Patient denies chest pain, shortness of breath, palpitations, orthopnea, presyncope, syncope.

## 2019-08-16 NOTE — ASSESSMENT
[FreeTextEntry1] : EKG: Sinus rhythm with first degree AV block. No significant ST or T wave changes. Anteroseptal Q waves noted. \par \par 54-year-old man with a past medical history of hypertrophic cardiomyopathy, hypertension, diabetes who presents to me for followup evaluation. Patient has had no symptoms or any recurrence of episodes of syncope.  Loop recorder continues to show no events. Blood pressure remains elevated.  Given that he will not checked his blood pressure at home regularly I will augment his antihypertensive therapy at this time. He certainly is to continue work with cutting out carbs and improving his diet but his triglycerides and A1c have actually improved some.

## 2019-09-13 ENCOUNTER — APPOINTMENT (OUTPATIENT)
Dept: CARDIOLOGY | Facility: CLINIC | Age: 54
End: 2019-09-13
Payer: MEDICAID

## 2019-09-13 PROCEDURE — 93299: CPT

## 2019-09-13 PROCEDURE — 93298 REM INTERROG DEV EVAL SCRMS: CPT

## 2019-10-02 ENCOUNTER — APPOINTMENT (OUTPATIENT)
Dept: NEUROLOGY | Facility: CLINIC | Age: 54
End: 2019-10-02
Payer: MEDICAID

## 2019-10-02 VITALS
BODY MASS INDEX: 33.6 KG/M2 | WEIGHT: 240 LBS | HEIGHT: 71 IN | SYSTOLIC BLOOD PRESSURE: 180 MMHG | DIASTOLIC BLOOD PRESSURE: 90 MMHG

## 2019-10-02 PROCEDURE — 99214 OFFICE O/P EST MOD 30 MIN: CPT

## 2019-10-02 NOTE — ASSESSMENT
[FreeTextEntry1] : This is a 54 year-old man who had an episode suggestive of seizure in June of 2018. \par EEG demonstrated a focal spike formation. This was left anterior temporal.\par \par He is currently on Depakote 500 mg twice per day.\par He has been seizure-free on this dosage for more than one year.\par \par Blood pressure was again high in my office.\par I advised him to discuss this with you further.\par \par I will see him back in 4 months.

## 2019-10-02 NOTE — PHYSICAL EXAM
[General Appearance - Alert] : alert [General Appearance - In No Acute Distress] : in no acute distress [Oriented To Time, Place, And Person] : oriented to person, place, and time [Affect] : the affect was normal [Memory Recent] : recent memory was not impaired [Memory Remote] : remote memory was not impaired [Cranial Nerves Optic (II)] : visual acuity intact bilaterally,  visual fields full to confrontation, pupils equal round and reactive to light [Cranial Nerves Oculomotor (III)] : extraocular motion intact [Cranial Nerves Trigeminal (V)] : facial sensation intact symmetrically [Cranial Nerves Facial (VII)] : face symmetrical [Cranial Nerves Vestibulocochlear (VIII)] : hearing was intact bilaterally [Cranial Nerves Glossopharyngeal (IX)] : tongue and palate midline [Cranial Nerves Accessory (XI - Cranial And Spinal)] : head turning and shoulder shrug symmetric [Cranial Nerves Hypoglossal (XII)] : there was no tongue deviation with protrusion [Motor Tone] : muscle tone was normal in all four extremities [Motor Strength] : muscle strength was normal in all four extremities [Sensation Tactile Decrease] : light touch was intact [Sensation Pain / Temperature Decrease] : pain and temperature was intact [Sensation Vibration Decrease] : vibration was intact [Abnormal Walk] : normal gait [1+] : Patella left 1+ [PERRL With Normal Accommodation] : pupils were equal in size, round, reactive to light, with normal accommodation [Optic Disc Abnormality] : the optic disc were normal in size and color [Edema] : there was no peripheral edema [Involuntary Movements] : no involuntary movements were seen [Dysarthria] : no dysarthria [Aphasia] : no dysphasia/aphasia [Romberg's Sign] : Romberg's sign was negtive [Coordination - Dysmetria Impaired Finger-to-Nose Bilateral] : not present [Plantar Reflex Right Only] : normal on the right [Plantar Reflex Left Only] : normal on the left

## 2019-10-02 NOTE — DATA REVIEWED
[de-identified] : Brain MRI was performed on 6/18/18 at [facility].\par There were some scattered foci of nonspecific gliosis which were unchanged from a prior study in December of 2017.\par  [de-identified] : EEG was performed in the hospital on 6/18/18.\par The study demonstrated some focal slowing over the left anterior temporal region. There was also spike and wave activity in the left frontotemporal region.\par
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
## 2019-10-14 ENCOUNTER — APPOINTMENT (OUTPATIENT)
Dept: CARDIOLOGY | Facility: CLINIC | Age: 54
End: 2019-10-14
Payer: MEDICAID

## 2019-10-14 PROCEDURE — 93299: CPT

## 2019-10-14 PROCEDURE — 93298 REM INTERROG DEV EVAL SCRMS: CPT

## 2019-10-16 ENCOUNTER — APPOINTMENT (OUTPATIENT)
Dept: CARDIOLOGY | Facility: CLINIC | Age: 54
End: 2019-10-16

## 2019-11-14 ENCOUNTER — APPOINTMENT (OUTPATIENT)
Dept: CARDIOLOGY | Facility: CLINIC | Age: 54
End: 2019-11-14
Payer: MEDICAID

## 2019-11-14 PROCEDURE — 93298 REM INTERROG DEV EVAL SCRMS: CPT

## 2019-11-14 PROCEDURE — 93299: CPT

## 2019-12-04 ENCOUNTER — NON-APPOINTMENT (OUTPATIENT)
Age: 54
End: 2019-12-04

## 2019-12-04 ENCOUNTER — APPOINTMENT (OUTPATIENT)
Dept: CARDIOLOGY | Facility: CLINIC | Age: 54
End: 2019-12-04
Payer: MEDICAID

## 2019-12-04 VITALS
DIASTOLIC BLOOD PRESSURE: 80 MMHG | OXYGEN SATURATION: 99 % | RESPIRATION RATE: 16 BRPM | HEIGHT: 71 IN | SYSTOLIC BLOOD PRESSURE: 137 MMHG | BODY MASS INDEX: 32.9 KG/M2 | WEIGHT: 235 LBS | HEART RATE: 69 BPM

## 2019-12-04 PROCEDURE — 93000 ELECTROCARDIOGRAM COMPLETE: CPT

## 2019-12-04 PROCEDURE — 99214 OFFICE O/P EST MOD 30 MIN: CPT

## 2019-12-04 NOTE — REASON FOR VISIT
[FreeTextEntry1] : Followup of hypertrophic cardiomyopathy and HTN with recent lower extremity edema

## 2019-12-04 NOTE — PHYSICAL EXAM
[General Appearance - In No Acute Distress] : no acute distress [Normal Conjunctiva] : the conjunctiva exhibited no abnormalities [Normal Oral Mucosa] : normal oral mucosa [Normal Oropharynx] : normal oropharynx [Auscultation Breath Sounds / Voice Sounds] : lungs were clear to auscultation bilaterally [Normal Rate] : normal [Rhythm Regular] : regular [Normal S1] : normal S1 [Normal S2] : normal S2 [S4] : an S4 was heard [Abdomen Soft] : soft [Abdomen Tenderness] : non-tender [Abnormal Walk] : normal gait [Nail Clubbing] : no clubbing of the fingernails [Cyanosis, Localized] : no localized cyanosis [Skin Color & Pigmentation] : normal skin color and pigmentation [Oriented To Time, Place, And Person] : oriented to person, place, and time [Affect] : the affect was normal [S3] : no S3 [II] : a grade 2 [FreeTextEntry1] : LLE 2+ edema to mid leg, R tr-1+ edema

## 2019-12-04 NOTE — HISTORY OF PRESENT ILLNESS
[FreeTextEntry1] : Patient returns to the office today having recently been having issues with lower extremity edema on the left. This started approximately a month ago and after a few days he went to the ER. He had a lower extremity venous Doppler which was negative for DVT. No further intervention was made that he has been trying to elevate the leg over the last month. Over that time the swelling has gone down a little bit but is still present. He has had some confusion with his doses of his medications. He reports no other physical symptoms at this time over the last month. He has had no dizziness or lightheadedness. Patient denies chest pain, shortness of breath, palpitations, orthopnea, presyncope, syncope.

## 2019-12-04 NOTE — ASSESSMENT
[FreeTextEntry1] : EKG: Sinus rhythm with first degree AV block. LVH with repolarization abnormality. Anteroseptal Q waves noted. \par \par 54-year-old man with a past medical history of hypertrophic cardiomyopathy, hypertension, diabetes who presents to me for followup evaluation.  Patient has been given with asymmetric swelling in the left leg over the last month. Extremity venous Doppler showed no evidence of DVT. He does have some swelling in the right as well and certainly some mild heart failure could be partially responsible. I will try some gentle diuresis to ensure we do not dehydrate him. A repeat blood work including checking a BNP. Additionally I would like to repeat his echocardiogram. He is planning to followup with his primary doctor but I'm not sure would be helpful to do an additional venous scan. Additionally we reviewed his medications to ensure that he is taking them properly.

## 2019-12-04 NOTE — DISCUSSION/SUMMARY
[FreeTextEntry1] : 1. Check echocardiogram given his recent edema.\par 2. He will take Lasix 20 mg daily for a week and then discontinue. Repeat blood work after the week.  Continue elevating his leg to help with the edema.\par 3. Continue other current cardiac meds in doses as noted above for hypertrophic cardiomyopathy and hypertension and dyslipidemia. I reviewed the medications with him and ensured that the doses are proper. The importance of compliance is reinforced strongly today.\par 4. Follow up with primary doctor for treatment of diabetes and edema.\par 5. Monitor BP at home, keep a log and bring to f/u. \par 6. Patient encouraged to work on diet to lose weight.  Encouraged to severely curtail carbs given elevated TG and A1C.  \par 7. Patient is encouraged to exercise at least 30 minutes a day everyday of the week.\par 8. Follow up here in 1 month.\par

## 2019-12-06 ENCOUNTER — APPOINTMENT (OUTPATIENT)
Dept: ENDOCRINOLOGY | Facility: CLINIC | Age: 54
End: 2019-12-06

## 2019-12-06 ENCOUNTER — RX RENEWAL (OUTPATIENT)
Age: 54
End: 2019-12-06

## 2019-12-06 RX ORDER — TELMISARTAN 20 MG/1
20 TABLET ORAL
Qty: 30 | Refills: 3 | Status: DISCONTINUED | COMMUNITY
Start: 2019-08-16 | End: 2019-12-06

## 2019-12-16 ENCOUNTER — APPOINTMENT (OUTPATIENT)
Dept: CARDIOLOGY | Facility: CLINIC | Age: 54
End: 2019-12-16
Payer: MEDICAID

## 2019-12-16 PROCEDURE — 93299: CPT

## 2019-12-16 PROCEDURE — 93298 REM INTERROG DEV EVAL SCRMS: CPT

## 2020-01-15 ENCOUNTER — APPOINTMENT (OUTPATIENT)
Dept: CARDIOLOGY | Facility: CLINIC | Age: 55
End: 2020-01-15
Payer: MEDICAID

## 2020-01-15 PROCEDURE — 93298 REM INTERROG DEV EVAL SCRMS: CPT

## 2020-01-15 PROCEDURE — G2066: CPT

## 2020-01-20 ENCOUNTER — APPOINTMENT (OUTPATIENT)
Dept: CARDIOLOGY | Facility: CLINIC | Age: 55
End: 2020-01-20
Payer: MEDICAID

## 2020-01-20 ENCOUNTER — NON-APPOINTMENT (OUTPATIENT)
Age: 55
End: 2020-01-20

## 2020-01-20 VITALS
HEIGHT: 71 IN | WEIGHT: 225 LBS | RESPIRATION RATE: 16 BRPM | SYSTOLIC BLOOD PRESSURE: 155 MMHG | DIASTOLIC BLOOD PRESSURE: 94 MMHG | HEART RATE: 68 BPM | BODY MASS INDEX: 31.5 KG/M2

## 2020-01-20 PROCEDURE — 93000 ELECTROCARDIOGRAM COMPLETE: CPT

## 2020-01-20 PROCEDURE — 99214 OFFICE O/P EST MOD 30 MIN: CPT

## 2020-01-20 RX ORDER — FUROSEMIDE 20 MG/1
20 TABLET ORAL DAILY
Qty: 30 | Refills: 0 | Status: DISCONTINUED | COMMUNITY
Start: 2019-12-04 | End: 2020-01-20

## 2020-01-20 NOTE — ASSESSMENT
[FreeTextEntry1] : EKG: Sinus rhythm with first degree AV block. LVH with repolarization abnormality. No change from prior.\par \par 55-year-old man with a past medical history of hypertrophic cardiomyopathy, hypertension, diabetes who presents to me for followup evaluation.  Patient's lower extremity edema has improved significantly. He took Lasix for a week and then discontinued. His blood pressures again a bit elevated here today and I have encouraged him to start checking more regularly at home. Strict control of blood pressure along with strict restriction on salt intake be very helpful and avoiding future episodes of edema and CHF. No other evidence of acute CHF at this time. Review of his loop recorder shows no events. He has no other symptoms.

## 2020-01-20 NOTE — PHYSICAL EXAM
[General Appearance - In No Acute Distress] : no acute distress [Normal Oral Mucosa] : normal oral mucosa [Normal Oropharynx] : normal oropharynx [Normal Conjunctiva] : the conjunctiva exhibited no abnormalities [Auscultation Breath Sounds / Voice Sounds] : lungs were clear to auscultation bilaterally [Abdomen Soft] : soft [Abdomen Tenderness] : non-tender [Abnormal Walk] : normal gait [Nail Clubbing] : no clubbing of the fingernails [Skin Color & Pigmentation] : normal skin color and pigmentation [Cyanosis, Localized] : no localized cyanosis [Oriented To Time, Place, And Person] : oriented to person, place, and time [Affect] : the affect was normal [Normal Rate] : normal [Rhythm Regular] : regular [Normal S1] : normal S1 [Normal S2] : normal S2 [S4] : an S4 was heard [II] : a grade 2 [S3] : no S3 [FreeTextEntry1] : Tr pedal edema b/l

## 2020-01-20 NOTE — HISTORY OF PRESENT ILLNESS
[FreeTextEntry1] : Patient returns to the office today noting his edema has improved significantly. It is essentially resolved. He took Lasix for a week and then stop it as I had suggested. He otherwise is feeling well this time offers no other new complaints. He has not been checking his blood pressure at home. Patient denies chest pain, shortness of breath, palpitations, orthopnea, presyncope, syncope.

## 2020-01-20 NOTE — DISCUSSION/SUMMARY
[FreeTextEntry1] : 1. Check echocardiogram given his recent edema.\par 2. Increase metoprolol to 100 mg b.i.d. given his elevated blood pressure. May need additional medication as well.\par 3. Continue other current cardiac meds in doses as noted above for hypertrophic cardiomyopathy and hypertension and dyslipidemia. \par 4. Follow up with primary doctor for treatment of diabetes.\par 5. Monitor BP at home, keep a log and bring to f/u. \par 6. Patient encouraged to work on diet to lose weight.  Encouraged to severely curtail carbs given elevated TG and A1C.  \par 7. Patient is encouraged to exercise at least 30 minutes a day everyday of the week.\par 8. Follow up here in 3 months.\par

## 2020-01-29 ENCOUNTER — APPOINTMENT (OUTPATIENT)
Dept: GASTROENTEROLOGY | Facility: CLINIC | Age: 55
End: 2020-01-29
Payer: MEDICAID

## 2020-01-29 VITALS
DIASTOLIC BLOOD PRESSURE: 79 MMHG | RESPIRATION RATE: 15 BRPM | HEART RATE: 70 BPM | WEIGHT: 240 LBS | BODY MASS INDEX: 33.6 KG/M2 | SYSTOLIC BLOOD PRESSURE: 120 MMHG | HEIGHT: 71 IN | OXYGEN SATURATION: 98 %

## 2020-01-29 DIAGNOSIS — Z78.9 OTHER SPECIFIED HEALTH STATUS: ICD-10-CM

## 2020-01-29 PROCEDURE — 82272 OCCULT BLD FECES 1-3 TESTS: CPT

## 2020-01-29 PROCEDURE — 99204 OFFICE O/P NEW MOD 45 MIN: CPT

## 2020-01-29 NOTE — ASSESSMENT
[FreeTextEntry1] : A/P\par + occult blood on exam today\par  I discussed the risks and benefits of colonoscopy and patient was given opportunity to ask questions. Colonoscopy to r/o colon cancer, polyps, AVM's. Patient is medically optimized for the procedure\par miralax prep\par \par gerd- mild\par Today's instructions for acid reflux include avoid provocative foods. For example citrus alcohol coffee chocolate mints. Smaller meals, no eating 3 hours prior to bedtime and elevate head of the bed prior to sleep.\par h2 blocker qd\par \par

## 2020-01-29 NOTE — PHYSICAL EXAM
[General Appearance - Alert] : alert [General Appearance - In No Acute Distress] : in no acute distress [General Appearance - Well Nourished] : well nourished [General Appearance - Well Developed] : well developed [Sclera] : the sclera and conjunctiva were normal [Outer Ear] : the ears and nose were normal in appearance [Neck Appearance] : the appearance of the neck was normal [Neck Cervical Mass (___cm)] : no neck mass was observed [Respiration, Rhythm And Depth] : normal respiratory rhythm and effort [Auscultation Breath Sounds / Voice Sounds] : lungs were clear to auscultation bilaterally [Exaggerated Use Of Accessory Muscles For Inspiration] : no accessory muscle use [Apical Impulse] : the apical impulse was normal [Heart Rate And Rhythm] : heart rate was normal and rhythm regular [Heart Sounds] : normal S1 and S2 [Bowel Sounds] : normal bowel sounds [Abdomen Soft] : soft [Abdomen Tenderness] : non-tender [Normal Sphincter Tone] : normal sphincter tone [No Rectal Mass] : no rectal mass [Internal Hemorrhoid] : no internal hemorrhoids [External Hemorrhoid] : no external hemorrhoids [Occult Blood Positive] : stool positive for occult blood [Femoral Lymph Nodes Enlarged Bilaterally] : femoral [Skin Color & Pigmentation] : normal skin color and pigmentation [Skin Turgor] : normal skin turgor [] : no rash [Oriented To Time, Place, And Person] : oriented to person, place, and time [Impaired Insight] : insight and judgment were intact

## 2020-01-29 NOTE — HISTORY OF PRESENT ILLNESS
[de-identified] : Patient has history of diabetes, hypertension, hypertrophic cardiomyopathy, seizure ( no siezure in over one year) , and first degree AV block , loop recorder and a hydrocele. The patient was seen by cardiology in January of 2020 for routine followup. \par     Patient has had history of GERD for 5 years. It is very mild. He gets epigastric burning pain in his times a week. No dysphagia. His upper mass about 20 minutes. It is worse with eating.\par     Patient has no constipation diarrhea black stools or bloody stools,  no abdominal pain. His family history of colon cancer or colon polyps. His creatinine in December was 1.8. Hemoglobin 11.9.

## 2020-02-05 ENCOUNTER — APPOINTMENT (OUTPATIENT)
Dept: CARDIOLOGY | Facility: CLINIC | Age: 55
End: 2020-02-05
Payer: MEDICAID

## 2020-02-05 ENCOUNTER — NON-APPOINTMENT (OUTPATIENT)
Age: 55
End: 2020-02-05

## 2020-02-05 ENCOUNTER — APPOINTMENT (OUTPATIENT)
Dept: NEUROLOGY | Facility: CLINIC | Age: 55
End: 2020-02-05
Payer: MEDICAID

## 2020-02-05 VITALS
OXYGEN SATURATION: 98 % | HEART RATE: 65 BPM | WEIGHT: 244 LBS | SYSTOLIC BLOOD PRESSURE: 150 MMHG | HEIGHT: 71 IN | RESPIRATION RATE: 16 BRPM | BODY MASS INDEX: 34.16 KG/M2 | DIASTOLIC BLOOD PRESSURE: 88 MMHG

## 2020-02-05 VITALS
HEIGHT: 71 IN | DIASTOLIC BLOOD PRESSURE: 80 MMHG | SYSTOLIC BLOOD PRESSURE: 130 MMHG | WEIGHT: 240 LBS | BODY MASS INDEX: 33.6 KG/M2

## 2020-02-05 VITALS — SYSTOLIC BLOOD PRESSURE: 138 MMHG | DIASTOLIC BLOOD PRESSURE: 70 MMHG

## 2020-02-05 PROCEDURE — 93000 ELECTROCARDIOGRAM COMPLETE: CPT

## 2020-02-05 PROCEDURE — 99213 OFFICE O/P EST LOW 20 MIN: CPT

## 2020-02-05 PROCEDURE — 93291 INTERROG DEV EVAL SCRMS IP: CPT | Mod: 59

## 2020-02-05 PROCEDURE — 99214 OFFICE O/P EST MOD 30 MIN: CPT
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
## 2020-02-05 NOTE — PHYSICAL EXAM
[General Appearance - In No Acute Distress] : in no acute distress [General Appearance - Alert] : alert [Oriented To Time, Place, And Person] : oriented to person, place, and time [Affect] : the affect was normal [Memory Recent] : recent memory was not impaired [Memory Remote] : remote memory was not impaired [Cranial Nerves Oculomotor (III)] : extraocular motion intact [Cranial Nerves Optic (II)] : visual acuity intact bilaterally,  visual fields full to confrontation, pupils equal round and reactive to light [Cranial Nerves Facial (VII)] : face symmetrical [Cranial Nerves Trigeminal (V)] : facial sensation intact symmetrically [Cranial Nerves Accessory (XI - Cranial And Spinal)] : head turning and shoulder shrug symmetric [Cranial Nerves Vestibulocochlear (VIII)] : hearing was intact bilaterally [Cranial Nerves Glossopharyngeal (IX)] : tongue and palate midline [Cranial Nerves Hypoglossal (XII)] : there was no tongue deviation with protrusion [Motor Strength] : muscle strength was normal in all four extremities [Motor Tone] : muscle tone was normal in all four extremities [Sensation Pain / Temperature Decrease] : pain and temperature was intact [Sensation Tactile Decrease] : light touch was intact [Sensation Vibration Decrease] : vibration was intact [Abnormal Walk] : normal gait [1+] : Patella left 1+ [PERRL With Normal Accommodation] : pupils were equal in size, round, reactive to light, with normal accommodation [Optic Disc Abnormality] : the optic disc were normal in size and color [Edema] : there was no peripheral edema [Involuntary Movements] : no involuntary movements were seen [Dysarthria] : no dysarthria [Aphasia] : no dysphasia/aphasia [Romberg's Sign] : Romberg's sign was negtive [Coordination - Dysmetria Impaired Finger-to-Nose Bilateral] : not present [Plantar Reflex Right Only] : normal on the right [Plantar Reflex Left Only] : normal on the left

## 2020-02-05 NOTE — DATA REVIEWED
[de-identified] : Brain MRI was performed on 6/18/18 at [facility].\par There were some scattered foci of nonspecific gliosis which were unchanged from a prior study in December of 2017.\par  [de-identified] : EEG was performed in the hospital on 6/18/18.\par The study demonstrated some focal slowing over the left anterior temporal region. There was also spike and wave activity in the left frontotemporal region.\par

## 2020-02-05 NOTE — ASSESSMENT
[FreeTextEntry1] : This is a 55 year-old man who had an episode suggestive of seizure in June of 2018. \par EEG demonstrated a focal spike formation. This was left anterior temporal.\par \par He is currently on Depakote 500 mg twice per day.\par He has been seizure-free on this dosage for more than one year.\par \par I will see him back in 6 months.

## 2020-02-11 RX ORDER — TELMISARTAN 20 MG/1
20 TABLET ORAL
Refills: 0 | Status: DISCONTINUED | COMMUNITY
End: 2020-02-11

## 2020-02-11 NOTE — HISTORY OF PRESENT ILLNESS
[FreeTextEntry1] : Patient is a 56yo M with HCM, HTN, DM2, ILR, seizure disorder here for cardiac evaluation of dizziness.  Metoprolol increased in january for HTN. Seen by PMD today for symptoms and advised to come here. For past week has been dizzy weak. When stands up and walks will feel dizzy, also that cant see clearly. No syncope but nearly. No CP/SOB. No PND/orthopnea/edema. HAs been off lasix past few weeks. Mild cough but no fevers/chills. NO sputum. No change in bowel or bladder habits. Snores at night and can be heard down the stairs.  \par \par Also recently seen by GI for positive occult blood and planned for colonoscopy. \par \par ROS: GI and  negative

## 2020-02-11 NOTE — PHYSICAL EXAM
[General Appearance - Well Nourished] : well nourished [General Appearance - Well Developed] : well developed [Normal Oropharynx] : normal oropharynx [Normal Conjunctiva] : the conjunctiva exhibited no abnormalities [Normal Jugular Venous V Waves Present] : normal jugular venous V waves present [Respiration, Rhythm And Depth] : normal respiratory rhythm and effort [] : no respiratory distress [Abdomen Soft] : soft [Auscultation Breath Sounds / Voice Sounds] : lungs were clear to auscultation bilaterally [Bowel Sounds] : normal bowel sounds [Abdomen Tenderness] : non-tender [Nail Clubbing] : no clubbing of the fingernails [Abnormal Walk] : normal gait [Cyanosis, Localized] : no localized cyanosis [Skin Turgor] : normal skin turgor [Skin Color & Pigmentation] : normal skin color and pigmentation [Oriented To Time, Place, And Person] : oriented to person, place, and time [Heart Rate And Rhythm] : heart rate and rhythm were normal [Heart Sounds] : normal S1 and S2 [FreeTextEntry1] : trace to 1+ edema at the ankles

## 2020-02-11 NOTE — DISCUSSION/SUMMARY
[FreeTextEntry1] : Patient is a 56yo M with HCM, HTN, DM2, ILR, seizure disorder here for cardiac evaluation of dizziness. Has also had pauses overnight/brief CHB and 2:1 block but none during the day and no cardiac events during episodes of dizziness. Suspect symptoms from higher dose metoprolol and will cut back. All of pauses also have occurred since increasing dose and will continue to monitor, no PPM at this time. Will arrange sleep study, YOSVANY likely is contributing\par \par 1. Cut back metoprolol to 50mg bid, consider increasing ARB\par 2. Check CBC, CMP, Mg\par 3. Continue to monitor ILR\par 4. Sleep study\par 5. Echo already scheduled for next week\par 6. Follow up 3 weeks with Dr. Tran

## 2020-02-11 NOTE — ASSESSMENT
[FreeTextEntry1] : \par \par ILR: Multiple pauses, 3-4 seconds all overnight while sleeping. Mostly in past 2 nights, also on January

## 2020-02-11 NOTE — ADDENDUM
[FreeTextEntry1] : 2/11/2020 Addendum: REviewed BW, mild increase in BUN/creatinine and potassium. Remains off lasix. He is taking Valsartan 80mg daily and not Telmisartan (correction to note above). NO signs active GI bleeding. Cut back K in diet and will repeat BW. Has follow up with Dr. Tran, also with GI for positive occult blood.

## 2020-02-13 ENCOUNTER — APPOINTMENT (OUTPATIENT)
Dept: CARDIOLOGY | Facility: CLINIC | Age: 55
End: 2020-02-13
Payer: MEDICAID

## 2020-02-13 PROCEDURE — 93306 TTE W/DOPPLER COMPLETE: CPT

## 2020-02-17 ENCOUNTER — APPOINTMENT (OUTPATIENT)
Dept: CARDIOLOGY | Facility: CLINIC | Age: 55
End: 2020-02-17
Payer: MEDICAID

## 2020-02-17 PROCEDURE — 93298 REM INTERROG DEV EVAL SCRMS: CPT

## 2020-02-17 PROCEDURE — G2066: CPT

## 2020-03-02 ENCOUNTER — APPOINTMENT (OUTPATIENT)
Dept: CARDIOLOGY | Facility: CLINIC | Age: 55
End: 2020-03-02
Payer: MEDICAID

## 2020-03-02 ENCOUNTER — NON-APPOINTMENT (OUTPATIENT)
Age: 55
End: 2020-03-02

## 2020-03-02 VITALS
HEIGHT: 71 IN | DIASTOLIC BLOOD PRESSURE: 82 MMHG | WEIGHT: 246 LBS | HEART RATE: 66 BPM | SYSTOLIC BLOOD PRESSURE: 160 MMHG | BODY MASS INDEX: 34.44 KG/M2 | RESPIRATION RATE: 16 BRPM

## 2020-03-02 PROCEDURE — 99214 OFFICE O/P EST MOD 30 MIN: CPT

## 2020-03-02 PROCEDURE — 93000 ELECTROCARDIOGRAM COMPLETE: CPT

## 2020-03-02 NOTE — HISTORY OF PRESENT ILLNESS
[FreeTextEntry1] : Patient returns to the office today ahead of planned colonoscopy being done for routine evaluation. Patient was seen in the office about a month ago because he had been having pauses overnight on his loop recorder. His metoprolol was decreased back to 50 mg twice a day. He has no symptoms of pauses at all. Reports no dizziness or lightheadedness. He does not monitor his blood pressure home but it is elevated here again today. He is not very active but reports no exertional symptoms with his daily activities. Patient denies chest pain, shortness of breath, palpitations, orthopnea, presyncope, syncope.

## 2020-03-02 NOTE — PHYSICAL EXAM
[General Appearance - In No Acute Distress] : no acute distress [Normal Oral Mucosa] : normal oral mucosa [Normal Conjunctiva] : the conjunctiva exhibited no abnormalities [Normal Oropharynx] : normal oropharynx [Auscultation Breath Sounds / Voice Sounds] : lungs were clear to auscultation bilaterally [Abdomen Soft] : soft [Abdomen Tenderness] : non-tender [Abnormal Walk] : normal gait [Cyanosis, Localized] : no localized cyanosis [Nail Clubbing] : no clubbing of the fingernails [Skin Color & Pigmentation] : normal skin color and pigmentation [Oriented To Time, Place, And Person] : oriented to person, place, and time [Affect] : the affect was normal [Normal Rate] : normal [Rhythm Regular] : regular [Normal S1] : normal S1 [Normal S2] : normal S2 [S4] : an S4 was heard [II] : a grade 2 [S3] : no S3 [FreeTextEntry1] : Tr pedal edema b/l

## 2020-03-02 NOTE — ASSESSMENT
[FreeTextEntry1] : Echocardiogram February 13, 2020 demonstrated left ventricle normal in size and function with ejection fraction of 55-60%. Moderate asymmetric left ventricular hypertrophy as seen previously. Mild dilated left atrium. Mild to moderate mitral regurgitation.\par \par EKG: Sinus rhythm with first degree AV block. LVH with repolarization abnormality. \par \par 55-year-old man with a past medical history of hypertrophic cardiomyopathy, hypertension, diabetes who presents to me for followup evaluation.  Patient appears to be asymptomatic at this time. Edema is controlled. He is no evidence of acute heart failure or significant arrhythmia. His metoprolol was decreased at last visit because of some pauses. Blood pressure is now more elevated. He has also gained some significantly. Laboratory work did show an increase in his creatinine slightly but also a significant elevation in his potassium and should be repeated. I will not increase his ARB at this time. I would like him to undergo evaluation of his coronaries which has not been done via CAT scanning given his cardiomyopathy. Additionally he needs repeat blood work. If these are unremarkable I do not see any cardiac contraindication to colonoscopy.

## 2020-03-02 NOTE — DISCUSSION/SUMMARY
[FreeTextEntry1] : 1. 1. Check cardiac CTA to rule out CAD given his diabetes and cardiomyopathy.\par 2. Repeat blood work for a potassium and creatinine.\par 3. Add Cardura 4 mg daily for hypertension.\par 4. Continue other current cardiac meds in doses as noted above for hypertrophic cardiomyopathy and hypertension and dyslipidemia. \par 5. Follow up with primary doctor for treatment of diabetes.\par 6. Monitor BP at home, keep a log and bring to f/u. \par 7. Patient encouraged to work on diet to lose weight.  Encouraged to severely curtail carbs given elevated TG and A1C.  \par 8. If testing is unremarkable and his potassium has improved there would be no cardiac contraindication to colonoscopy.\par 9. Continue monitoring of loop recorder and looking for additional pauses. No indication for pacemaker at this time.\par 10. Follow up here in one month.

## 2020-03-26 ENCOUNTER — APPOINTMENT (OUTPATIENT)
Dept: GASTROENTEROLOGY | Facility: GI CENTER | Age: 55
End: 2020-03-26

## 2020-04-08 ENCOUNTER — APPOINTMENT (OUTPATIENT)
Dept: CARDIOLOGY | Facility: CLINIC | Age: 55
End: 2020-04-08

## 2020-04-15 ENCOUNTER — APPOINTMENT (OUTPATIENT)
Dept: CARDIOLOGY | Facility: CLINIC | Age: 55
End: 2020-04-15

## 2020-04-17 ENCOUNTER — APPOINTMENT (OUTPATIENT)
Dept: CARDIOLOGY | Facility: CLINIC | Age: 55
End: 2020-04-17
Payer: MEDICAID

## 2020-04-17 PROCEDURE — G2066: CPT

## 2020-04-17 PROCEDURE — 93298 REM INTERROG DEV EVAL SCRMS: CPT

## 2020-05-11 RX ORDER — RANITIDINE 150 MG/1
150 TABLET ORAL
Refills: 0 | Status: DISCONTINUED | COMMUNITY
End: 2020-05-11

## 2020-05-12 ENCOUNTER — APPOINTMENT (OUTPATIENT)
Dept: CARDIOLOGY | Facility: CLINIC | Age: 55
End: 2020-05-12
Payer: MEDICAID

## 2020-05-12 PROCEDURE — 99213 OFFICE O/P EST LOW 20 MIN: CPT | Mod: 95

## 2020-05-12 NOTE — DISCUSSION/SUMMARY
[FreeTextEntry1] : 1. Increase Lasix to 40 mg daily.\par 2. Repeat blood work for a potassium and creatinine at the end of this week.\par 3. Continue other current cardiac meds in doses as noted above for hypertrophic cardiomyopathy and hypertension and dyslipidemia. \par 4. Plan to do CTA of the heart when possible.\par 5. Follow up with primary doctor for treatment of diabetes.\par 6. Monitor BP at home, keep a log and bring to f/u. \par 7. Patient encouraged to work on diet to lose weight.  \par 8. Continue monitoring of loop recorder and looking for additional pauses. No indication for pacemaker at this time.\par 9. Patient will have telehealth follow up in one week.

## 2020-05-12 NOTE — HISTORY OF PRESENT ILLNESS
[Home] : at home, [unfilled] , at the time of the visit. [Medical Office: ([facility])___] : at the medical office located in  [Other:____] : [unfilled] [Patient] : the patient [FreeTextEntry1] : Patient presents today for followup with complaints of recent lower extremity edema. The edema has been going on for a few weeks, possibly up to a months time. He did call about a week ago and was told to start taking his Lasix on a daily basis. Since doing that the edema has improved somewhat but is still present. He has been isolating himself during the pandemic and has been doing little activity although he reports no other symptoms.  Patient denies chest pain, shortness of breath, palpitations, orthopnea, presyncope, syncope.

## 2020-05-12 NOTE — ASSESSMENT
[FreeTextEntry1] : Echocardiogram February 13, 2020 demonstrated left ventricle normal in size and function with ejection fraction of 55-60%. Moderate asymmetric left ventricular hypertrophy as seen previously. Mild dilated left atrium. Mild to moderate mitral regurgitation.\par \par 55-year-old man with a past medical history of hypertrophic cardiomyopathy, hypertension, diabetes who presents to me for followup evaluation.  Patient has recently been having issues with lower extremity edema. He does not have any other symptoms to suggest significant acute heart failure. I believe a lot of the edema may be secondary to his inactivity and possibly some poor diet choices. It is improving slowly with Lasix I will increase the dose and continue to monitor for now. He was unable to get the CT of the heart done because of the pandemic but ultimately this should be completed when possible. He reports blood pressures at home are adequately controlled but he does not have a list of readings and I have asked him to keep one.

## 2020-05-18 ENCOUNTER — APPOINTMENT (OUTPATIENT)
Dept: CARDIOLOGY | Facility: CLINIC | Age: 55
End: 2020-05-18
Payer: MEDICAID

## 2020-05-18 PROCEDURE — G2066: CPT

## 2020-05-18 PROCEDURE — 93298 REM INTERROG DEV EVAL SCRMS: CPT

## 2020-05-19 ENCOUNTER — APPOINTMENT (OUTPATIENT)
Dept: CARDIOLOGY | Facility: CLINIC | Age: 55
End: 2020-05-19
Payer: MEDICAID

## 2020-05-19 DIAGNOSIS — Q24.8 OTHER SPECIFIED CONGENITAL MALFORMATIONS OF HEART: ICD-10-CM

## 2020-05-19 PROCEDURE — 99214 OFFICE O/P EST MOD 30 MIN: CPT | Mod: 95

## 2020-05-19 NOTE — HISTORY OF PRESENT ILLNESS
[Other Location: e.g. School (Enter Location, City,State)___] : at [unfilled], at the time of the visit. [Patient] : the patient [Medical Office: ([facility])___] : at the medical office located in  [FreeTextEntry1] : Patient presents today with edema that has not improved very much with continued daily Lasix. His sister feels he has some more diffuse swelling, such as in his face. Blood pressure has not been checked recently until today, when it was noted to be 194/85. He reports no other new symptoms. He continues to have no shortness of breath or orthopnea at all. No dizziness or lightheadedness. He is continuing on all of his other medications as prescribed. Patient denies chest pain, palpitations, presyncope, syncope.

## 2020-05-19 NOTE — ASSESSMENT
[FreeTextEntry1] : Echocardiogram February 13, 2020 demonstrated left ventricle normal in size and function with ejection fraction of 55-60%. Moderate asymmetric left ventricular hypertrophy as seen previously. Mild dilated left atrium. Mild to moderate mitral regurgitation.\par \par 55-year-old man with a past medical history of hypertrophic cardiomyopathy, hypertension, diabetes who presents to me for followup evaluation.  Patient continues to have a lot of issues with edema which looks about the same. His face looks maybe a little bit more swollen. Blood work showed stable kidney function and electrolytes. BNP is under 200. At this time it seems less likely that CHF as a cause of the symptoms. He may have simply venous insufficiency given his extreme inactivity at this point being home in quarantine. Additionally he is having maybe a little bit of facial swelling so consideration for nephrosis could be made. At this time I will try to augment his diuresis a little bit to see if it helps. Additionally his blood pressure is elevated today but it was only checked once in the last weeks I have asked him to watch her more closely, as this may be related. Review of his loop monitor does show an additional 2 pauses of significance but otherwise no arrhythmias.

## 2020-05-19 NOTE — DISCUSSION/SUMMARY
[FreeTextEntry1] : 1. He will take Lasix 40 mg twice a day for 3 days and then go back to daily. Repeat blood work in one week.\par 2. Increase valsartan to 80 mg b.i.d.\par 3. Continue other current cardiac meds in doses as noted above for hypertrophic cardiomyopathy and hypertension and dyslipidemia. \par 4. Check lower extremity venous Doppler to rule out DVT given his edema.\par 5. Echocardiogram to reevaluate his cardiomyopathy given his recent edema. Plan to do CTA of the heart when possible.\par 6. Follow up with primary doctor for treatment of diabetes.\par 7. Monitor BP at home, keep a log and bring to f/u. If his blood pressure remains very elevated he will let me know.\par 8. Continue monitoring of loop recorder and looking for additional pauses. No indication for pacemaker at this time.\par 9. Plan in office followup in 2 weeks.

## 2020-06-01 ENCOUNTER — APPOINTMENT (OUTPATIENT)
Dept: CARDIOLOGY | Facility: CLINIC | Age: 55
End: 2020-06-01
Payer: MEDICAID

## 2020-06-01 PROCEDURE — 93306 TTE W/DOPPLER COMPLETE: CPT

## 2020-06-03 ENCOUNTER — APPOINTMENT (OUTPATIENT)
Dept: CARDIOLOGY | Facility: CLINIC | Age: 55
End: 2020-06-03
Payer: MEDICAID

## 2020-06-03 ENCOUNTER — NON-APPOINTMENT (OUTPATIENT)
Age: 55
End: 2020-06-03

## 2020-06-03 VITALS
RESPIRATION RATE: 17 BRPM | SYSTOLIC BLOOD PRESSURE: 117 MMHG | OXYGEN SATURATION: 96 % | BODY MASS INDEX: 37.52 KG/M2 | TEMPERATURE: 98.3 F | DIASTOLIC BLOOD PRESSURE: 72 MMHG | HEIGHT: 71 IN | HEART RATE: 72 BPM | WEIGHT: 268 LBS

## 2020-06-03 PROCEDURE — 93000 ELECTROCARDIOGRAM COMPLETE: CPT

## 2020-06-03 PROCEDURE — 99214 OFFICE O/P EST MOD 30 MIN: CPT

## 2020-06-03 NOTE — DISCUSSION/SUMMARY
[FreeTextEntry1] : 1. Increase Lasix to 40 mg b.i.d. for now. Repeat blood work in one week.\par 2. Continue other current cardiac meds in doses as noted above for hypertrophic cardiomyopathy and hypertension and dyslipidemia. \par 3. Check lower extremity venous Doppler to rule out DVT given his edema.\par 4. Check chest x-ray to evaluate his diminished breath sounds on the right.\par 5. Encouraged to elevate legs and use compression stockings to help with edema.\par 6. Monitor BP at home, keep a log and bring to f/u.\par 7. Continue monitoring of loop recorder and looking for additional pauses. No indication for pacemaker at this time.\par 8. Patient strongly encouraged on reducing salt intake.\par 9. Follow up with primary doctor for treatment of diabetes.\par 10. Follow up here in 3 weeks.\par

## 2020-06-03 NOTE — HISTORY OF PRESENT ILLNESS
[FreeTextEntry1] : Patient presents back today feeling a bit better last I spoke to him. His shortness of breath has improved somewhat although he still feels it when he walks. His edema has improved a little bit although certainly still present in a significant way. He reports his blood pressures have been well controlled although he does not have a list or any specific numbers today. He does have any new symptoms at this time. He reports no dizziness or lightheadedness. Patient denies chest pain, palpitations, orthopnea, presyncope, syncope.

## 2020-06-03 NOTE — ASSESSMENT
[FreeTextEntry1] : Echocardiogram June 1, 2020 pressures left ventricle normal size and function with ejection fraction of 60-65%. Moderate concentric LVH noted. Moderate mitral regurgitation is noted which is a bit worse than his prior echo.\par \par EKG: Sinus rhythm with second-degree type I heart block. No significant ST or T wave changes.\par \par 55-year-old man with a past medical history of hypertrophic cardiomyopathy, hypertension, diabetes who presents to me for followup evaluation.  The patient continues to have issues with edema which is a little bit improved. He notes that when he took Lasix twice a day it seemed to improve somewhat more. Echocardiogram continues to show a normal EF with hypertrophy. Mitral regurgitation appears a little bit worse although not severe. This may need to be further evaluated if he continues to have issues with edema. Laboratory work shows only a mildly elevated BNP and a lot of his edema may be secondary to more venous insufficiency, especially given his inactivity recently. EKG again shows a second degree heart block but his loop recorder has not shown any more significant pauses very recently. He is not have any symptoms of heart block.

## 2020-06-03 NOTE — PHYSICAL EXAM
[General Appearance - In No Acute Distress] : no acute distress [Normal Conjunctiva] : the conjunctiva exhibited no abnormalities [Normal Oral Mucosa] : normal oral mucosa [Normal Oropharynx] : normal oropharynx [Auscultation Breath Sounds / Voice Sounds] : lungs were clear to auscultation bilaterally [Normal Rate] : normal [Rhythm Regular] : regular [Normal S1] : normal S1 [Normal S2] : normal S2 [S4] : an S4 was heard [II] : a grade 2 [Abdomen Soft] : soft [Abnormal Walk] : normal gait [Abdomen Tenderness] : non-tender [Nail Clubbing] : no clubbing of the fingernails [Cyanosis, Localized] : no localized cyanosis [Skin Color & Pigmentation] : normal skin color and pigmentation [Oriented To Time, Place, And Person] : oriented to person, place, and time [Affect] : the affect was normal [S3] : no S3 [FreeTextEntry1] : 2+ lower extremity edema b/l

## 2020-06-18 ENCOUNTER — TRANSCRIPTION ENCOUNTER (OUTPATIENT)
Age: 55
End: 2020-06-18

## 2020-06-18 ENCOUNTER — APPOINTMENT (OUTPATIENT)
Dept: CARDIOLOGY | Facility: CLINIC | Age: 55
End: 2020-06-18
Payer: MEDICAID

## 2020-06-18 PROCEDURE — 93298 REM INTERROG DEV EVAL SCRMS: CPT

## 2020-06-18 PROCEDURE — G2066: CPT

## 2020-06-25 ENCOUNTER — NON-APPOINTMENT (OUTPATIENT)
Age: 55
End: 2020-06-25

## 2020-06-25 ENCOUNTER — APPOINTMENT (OUTPATIENT)
Dept: CARDIOLOGY | Facility: CLINIC | Age: 55
End: 2020-06-25
Payer: MEDICAID

## 2020-06-25 VITALS
HEART RATE: 66 BPM | WEIGHT: 275 LBS | OXYGEN SATURATION: 97 % | BODY MASS INDEX: 38.5 KG/M2 | RESPIRATION RATE: 16 BRPM | TEMPERATURE: 98 F | HEIGHT: 71 IN | DIASTOLIC BLOOD PRESSURE: 81 MMHG | SYSTOLIC BLOOD PRESSURE: 174 MMHG

## 2020-06-25 DIAGNOSIS — R60.0 LOCALIZED EDEMA: ICD-10-CM

## 2020-06-25 PROCEDURE — 93000 ELECTROCARDIOGRAM COMPLETE: CPT

## 2020-06-25 PROCEDURE — 99214 OFFICE O/P EST MOD 30 MIN: CPT

## 2020-06-25 RX ORDER — METOPROLOL TARTRATE 50 MG/1
50 TABLET, FILM COATED ORAL
Qty: 180 | Refills: 0 | Status: DISCONTINUED | COMMUNITY
Start: 2019-01-10 | End: 2020-06-25

## 2020-06-25 NOTE — HISTORY OF PRESENT ILLNESS
[FreeTextEntry1] : The patient returns to the office still having a lot of issues with edema. He ran out of Lasix about a week ago and never got it refilled. He feels that the Lasix did help somewhat but his edema is now possibly worse than it was. He also notes some edema in his right upper extremity. He has gained more significantly. He continues to be very active but reports no shortness of breath or shortness of breath when he exerts himself to the degree that he does so. He reports no other symptoms at this time. No dizziness or lightheadedness. Patient denies chest pain, palpitations, orthopnea, presyncope, syncope.

## 2020-06-25 NOTE — PHYSICAL EXAM
[Normal Conjunctiva] : the conjunctiva exhibited no abnormalities [Normal Oral Mucosa] : normal oral mucosa [General Appearance - In No Acute Distress] : no acute distress [Normal Oropharynx] : normal oropharynx [Auscultation Breath Sounds / Voice Sounds] : lungs were clear to auscultation bilaterally [Abdomen Soft] : soft [Abdomen Tenderness] : non-tender [Abnormal Walk] : normal gait [Nail Clubbing] : no clubbing of the fingernails [Cyanosis, Localized] : no localized cyanosis [Skin Color & Pigmentation] : normal skin color and pigmentation [Oriented To Time, Place, And Person] : oriented to person, place, and time [Affect] : the affect was normal [FreeTextEntry1] : 2+ RUE edema, 2+ lower extremity edema b/l R>L

## 2020-06-25 NOTE — DISCUSSION/SUMMARY
[FreeTextEntry1] : 1. Restart Lasix 40 mg b.i.d. for now. Repeat blood work in one week.\par 2. Decrease metoprolol back to 50 mg b.i.d.\par 3. Increase Cardura to 4 mg b.i.d.\par 4. Continue other current cardiac meds in doses as noted above for hypertrophic cardiomyopathy and hypertension and dyslipidemia. \par 5. Check lower extremity venous Doppler to rule out DVT given his edema. Will also check right upper extremity Doppler given that swelling.\par 6. Check chest x-ray to evaluate his diminished breath sounds on the right.\par 7. Encouraged to elevate legs and use compression stockings to help with edema.\par 8. Monitor BP at home, keep a log and bring to f/u.\par 9. Continue monitoring of loop recorder and looking for additional pauses. No indication for pacemaker at this time.\par 10. Patient strongly encouraged on reducing salt intake.\par 10. Follow up with primary doctor.\par 11. Follow up here in one month.\par

## 2020-07-02 RX ORDER — TELMISARTAN 20 MG/1
20 TABLET ORAL
Qty: 180 | Refills: 1 | Status: DISCONTINUED | COMMUNITY
Start: 2020-07-02 | End: 2020-07-02

## 2020-07-02 RX ORDER — VALSARTAN 80 MG/1
80 TABLET, COATED ORAL TWICE DAILY
Qty: 180 | Refills: 1 | Status: DISCONTINUED | COMMUNITY
Start: 2019-12-06 | End: 2020-07-02

## 2020-07-09 ENCOUNTER — OUTPATIENT (OUTPATIENT)
Dept: OUTPATIENT SERVICES | Facility: HOSPITAL | Age: 55
LOS: 1 days | End: 2020-07-09
Payer: MEDICAID

## 2020-07-09 ENCOUNTER — APPOINTMENT (OUTPATIENT)
Dept: ULTRASOUND IMAGING | Facility: CLINIC | Age: 55
End: 2020-07-09
Payer: MEDICAID

## 2020-07-09 ENCOUNTER — RESULT REVIEW (OUTPATIENT)
Age: 55
End: 2020-07-09

## 2020-07-09 DIAGNOSIS — Z00.00 ENCOUNTER FOR GENERAL ADULT MEDICAL EXAMINATION WITHOUT ABNORMAL FINDINGS: ICD-10-CM

## 2020-07-09 DIAGNOSIS — R60.0 LOCALIZED EDEMA: ICD-10-CM

## 2020-07-09 PROCEDURE — 93971 EXTREMITY STUDY: CPT | Mod: 26,RT,59

## 2020-07-09 PROCEDURE — 71046 X-RAY EXAM CHEST 2 VIEWS: CPT | Mod: 26

## 2020-07-09 PROCEDURE — 93971 EXTREMITY STUDY: CPT

## 2020-07-09 PROCEDURE — 71046 X-RAY EXAM CHEST 2 VIEWS: CPT

## 2020-07-09 PROCEDURE — 93971 EXTREMITY STUDY: CPT | Mod: 26,RT

## 2020-07-09 PROCEDURE — 93970 EXTREMITY STUDY: CPT | Mod: 26

## 2020-07-09 PROCEDURE — 93970 EXTREMITY STUDY: CPT

## 2020-07-23 ENCOUNTER — APPOINTMENT (OUTPATIENT)
Dept: NEPHROLOGY | Facility: CLINIC | Age: 55
End: 2020-07-23
Payer: MEDICAID

## 2020-07-23 ENCOUNTER — APPOINTMENT (OUTPATIENT)
Dept: CARDIOLOGY | Facility: CLINIC | Age: 55
End: 2020-07-23
Payer: MEDICAID

## 2020-07-23 VITALS
WEIGHT: 280 LBS | RESPIRATION RATE: 16 BRPM | TEMPERATURE: 98.5 F | DIASTOLIC BLOOD PRESSURE: 81 MMHG | HEART RATE: 69 BPM | SYSTOLIC BLOOD PRESSURE: 149 MMHG | BODY MASS INDEX: 39.2 KG/M2 | HEIGHT: 71 IN | OXYGEN SATURATION: 97 %

## 2020-07-23 DIAGNOSIS — D64.9 ANEMIA, UNSPECIFIED: ICD-10-CM

## 2020-07-23 DIAGNOSIS — Z87.898 PERSONAL HISTORY OF OTHER SPECIFIED CONDITIONS: ICD-10-CM

## 2020-07-23 PROCEDURE — G2066: CPT

## 2020-07-23 PROCEDURE — 93298 REM INTERROG DEV EVAL SCRMS: CPT

## 2020-07-23 PROCEDURE — 99204 OFFICE O/P NEW MOD 45 MIN: CPT

## 2020-07-23 RX ORDER — IRBESARTAN 75 MG/1
75 TABLET ORAL TWICE DAILY
Qty: 180 | Refills: 1 | Status: DISCONTINUED | COMMUNITY
Start: 2020-07-02 | End: 2020-07-23

## 2020-07-23 RX ORDER — MAGNESIUM OXIDE 241.3 MG/1000MG
400 TABLET ORAL TWICE DAILY
Qty: 180 | Refills: 1 | Status: DISCONTINUED | COMMUNITY
Start: 2020-06-25 | End: 2020-07-23

## 2020-07-27 ENCOUNTER — APPOINTMENT (OUTPATIENT)
Dept: ULTRASOUND IMAGING | Facility: CLINIC | Age: 55
End: 2020-07-27
Payer: MEDICAID

## 2020-07-27 ENCOUNTER — OUTPATIENT (OUTPATIENT)
Dept: OUTPATIENT SERVICES | Facility: HOSPITAL | Age: 55
LOS: 1 days | End: 2020-07-27
Payer: MEDICAID

## 2020-07-27 DIAGNOSIS — Z00.00 ENCOUNTER FOR GENERAL ADULT MEDICAL EXAMINATION WITHOUT ABNORMAL FINDINGS: ICD-10-CM

## 2020-07-27 DIAGNOSIS — Z00.8 ENCOUNTER FOR OTHER GENERAL EXAMINATION: ICD-10-CM

## 2020-07-27 PROCEDURE — 76770 US EXAM ABDO BACK WALL COMP: CPT

## 2020-07-27 PROCEDURE — 76770 US EXAM ABDO BACK WALL COMP: CPT | Mod: 26

## 2020-07-30 ENCOUNTER — NON-APPOINTMENT (OUTPATIENT)
Age: 55
End: 2020-07-30

## 2020-07-30 ENCOUNTER — APPOINTMENT (OUTPATIENT)
Dept: CARDIOLOGY | Facility: CLINIC | Age: 55
End: 2020-07-30
Payer: MEDICAID

## 2020-07-30 VITALS
DIASTOLIC BLOOD PRESSURE: 86 MMHG | TEMPERATURE: 97.7 F | HEART RATE: 68 BPM | RESPIRATION RATE: 16 BRPM | SYSTOLIC BLOOD PRESSURE: 158 MMHG | BODY MASS INDEX: 39.25 KG/M2 | WEIGHT: 280.38 LBS | HEIGHT: 71 IN

## 2020-07-30 PROBLEM — D64.9 ANEMIA: Status: ACTIVE | Noted: 2020-07-30

## 2020-07-30 PROCEDURE — 93000 ELECTROCARDIOGRAM COMPLETE: CPT

## 2020-07-30 PROCEDURE — 99214 OFFICE O/P EST MOD 30 MIN: CPT

## 2020-07-30 NOTE — REVIEW OF SYSTEMS
[Lower Ext Edema] : lower extremity edema [Negative] : Heme/Lymph [Heart Rate Is Slow] : the heart rate was not slow [Heart Rate Is Fast] : the heart rate was not fast [Chest Pain] : no chest pain [Palpitations] : no palpitations [Leg Claudication] : no intermittent leg claudication

## 2020-07-30 NOTE — HISTORY OF PRESENT ILLNESS
[FreeTextEntry1] : The patient returns to the office today still having a lot of issues with primarily lower extremity edema, but sometimes edema in his upper trapezius and his face. He followed up with nephrology and underwent a renal ultrasound and had some adjustments to his medications but it still remains an issue. He has been checking his blood pressure at home but did not bring a list and is not sure what his numbers have been. He reports a little bit of dizziness at times when he walks but not consistently. He reports no other specific physical symptoms at this time. Patient denies chest pain, shortness of breath, palpitations, orthopnea, presyncope, syncope.

## 2020-07-30 NOTE — DISCUSSION/SUMMARY
[FreeTextEntry1] : 1. Continue current cardiac meds in doses as noted above for hypertrophic cardiomyopathy and hypertension and dyslipidemia. \par 2. Encouraged to elevate legs and use compression stockings to help with edema. I have referred him to the edema clinic.\par 3. Monitor BP at home, keep a log and bring to f/u.\par 4. Continue monitoring of loop recorder and looking for additional pauses. No indication for pacemaker at this time. Check sleep study as many of his pauses seem to occur while he is sleeping.\par 5. Follow up with renal for further evaluation for nephrosis.\par 6. Patient strongly encouraged on reducing salt intake.\par 7. Follow up with primary doctor.\par 8. Follow up here in six weeks.

## 2020-07-30 NOTE — ASSESSMENT
[FreeTextEntry1] : CKD: Hx. of DM, HTN, hypertrophic cardiomyopathy\par \par PLAN:\par Medications reviewed\par Labs from July 2020 reviewed: SCr 2.17, GFR 32, labs from June 2020 reviewed: SCr 1.76, GFR 41 Recent \par Will decrease Valsartan from 40mg twice daily to 40mg daily for now.\par Continue Furosemide 40mg BID, Metoprolol 50mg BID, Cardura 4 mg BID, Atorvastatin 40mg. Amlodipine 5 mg daily added for blood pressure control\par New labs ordered today (will fax to Endo.), Renal US ordered\par DM: on insulin. Limit daily carbohydrate intake. Follow with Endo.\par Work on weight loss, DASH diet\par Keep log of daily blood pressure readings. Call if systolic numbers >150 or diastolic numbers >80\par F/U in office in 1 month\par Call with additional questions/concerns\par \par Education:\par Blood Pressure Monitoring\par Keep log of blood pressures to be given to provider at next office visit\par Relax for at least 5 minutes before taking blood pressure, feet on floor with back supported\par Repeat measurements should be  by 2 minutes\par Do not have caffeine before checking blood pressure\par \par HTN and CKD are closely interlinked pathophysiologic states, such that sustained HTN can lead to worsening kidney function. Progressive decline in kidney function can conversely lead to worsening blood pressure control.\par \par The pathophysiology of HTN in CKD is complex and is a sequela of multiple factors, including reduced nephron mass, increased Na retention and extracellular volume expansion, SNS overactivity, activation of hormones including the RAAS system, and endothelial dysfunction.\par \par Currently, the treatment target for patients with CKD is a clinical systolic BP <130. The main approaches to the management of hypertension in CKD include:\par 1) Dietary salt restriction\par 2) Initiation of treatment with ACE inhibitors or ARBS\par 3) Diuretic therapy\par \par Uncontrolled HTN can lead to significant cardiovascular morbidity and mortality and accelerate progression to ESRD.\par Although intensive BP control has not been shown in clinical trials to slow the progression of CKD, intensive BP control reduces the risk for adverse cardiovascular outcomes and mortality in the CKD population.\par \par ADDENDUM:\par Normal renal US\par Labs from July 29, 2020 reviewed: SCr 2.0, eGFR 35\par hgb 10.4 - will obtain iron studies next visit\par Vitamin D 25-OH level 23, on supplement

## 2020-07-30 NOTE — PHYSICAL EXAM
[General Appearance - In No Acute Distress] : in no acute distress [General Appearance - Alert] : alert [Sclera] : the sclera and conjunctiva were normal [Neck Appearance] : the appearance of the neck was normal [Outer Ear] : the ears and nose were normal in appearance [Neck Cervical Mass (___cm)] : no neck mass was observed [] : no respiratory distress [Exaggerated Use Of Accessory Muscles For Inspiration] : no accessory muscle use [Respiration, Rhythm And Depth] : normal respiratory rhythm and effort [Apical Impulse] : the apical impulse was normal [Heart Rate And Rhythm] : heart rate was normal and rhythm regular [Heart Sounds] : normal S1 and S2 [Heart Sounds Gallop] : no gallops [Murmurs] : no murmurs [Heart Sounds Pericardial Friction Rub] : no pericardial rub [Bowel Sounds] : normal bowel sounds [Abdomen Tenderness] : non-tender [Abdomen Soft] : soft [Abdomen Mass (___ Cm)] : no abdominal mass palpated [No CVA Tenderness] : no ~M costovertebral angle tenderness [Involuntary Movements] : no involuntary movements were seen [Skin Color & Pigmentation] : normal skin color and pigmentation [Mood] : the mood was normal [Impaired Insight] : insight and judgment were intact [Affect] : the affect was normal [FreeTextEntry1] : 2-3+ LE edema, right leg slightly greater than left

## 2020-07-30 NOTE — HISTORY OF PRESENT ILLNESS
[FreeTextEntry1] : Initial Consultation\par \par Patient accompanied by his sister, Carissa, during appointment today\par \par PCP: Josiah Ward\par Cardiology: Tomas Tran\par Endocrinology: Rebecca Cabrera ([phone])\par Neurology: Dakota Chawla\par \par Social Hx: Born in [place]. Never smoker. Denies alcohol or drug use\par \par Fam. Hx: Denies family hx of kidney disease.\par \par PMH: hypertrophic cardiomyopathy, Left ventricular outflow tract obstruction, first-degree heart block, lower extremity edema, DM 2 (dx 2 years ago), HTN, seizure disorder, HLD, GERD\par \par Echocardiogram June 1, 2020 pressures left ventricle normal size and function with ejection fraction of 60-65%. Moderate concentric LVH noted. Moderate mitral regurgitation is noted which is a bit worse than his prior echo.\par \par Patient referred to our practice for worsening renal function. He has not seen a Nephrologist before. He feels well today but he has noticed worsening lower extremity swelling over the past two months. He was previously on Lasix but ran out of the medication and stopped taking it for a while. He was directed by Cardiologist to restart Lasix 40 mg BID at the end of June. Cardiologist also ordered lower extremity venous Doppler to rule out DVT - no evidence of DVT. Chest x-ray was done due to diminished breath sounds on the right - small right effusion. Patient states that he does not exercise at all. He denies dizziness, lightheadedness, chest pain, palpitations, syncope, dyspnea today.\par Patient follows with Endo. for DM control . Checks blood sugar levels three times a day, he said sometimes numbers go into 200's. He is unsure what is last hgb A1c level is. Patient said he has a history of hospitalizations due to diabetes.\par

## 2020-07-30 NOTE — ASSESSMENT
[FreeTextEntry1] : Echocardiogram June 1, 2020 pressures left ventricle normal size and function with ejection fraction of 60-65%. Moderate concentric LVH noted. Moderate mitral regurgitation is noted which is a bit worse than his prior echo.\par \par EKG: Sinus rhythm with first-degree heart block. No significant ST or T wave changes.\par \par 55-year-old man with a past medical history of hypertrophic cardiomyopathy, hypertension, diabetes who presents to me for followup evaluation.  Patient is to have issues with primarily lower extremity edema. His Doppler shows no evidence of DVT. Review of his blood work includes hypoalbuminemia and he does have a lot of protein loss on his UA. At this time I believe his edema may be more secondary to nephrosis. His BMP was not particularly elevated and his EF is normal. I do not see any other evidence of CHF. He did have a small right effusion on chest x-ray which may also be related. At this time I'm sending him back to nephrology for further workup for nephrosis. Additionally I will refer him to vascular for the edema clinic and he may benefit from leg wraps. I do not believe additional diuresis will be helpful.

## 2020-07-30 NOTE — PHYSICAL EXAM
[General Appearance - In No Acute Distress] : no acute distress [Normal Conjunctiva] : the conjunctiva exhibited no abnormalities [Normal Oral Mucosa] : normal oral mucosa [Auscultation Breath Sounds / Voice Sounds] : lungs were clear to auscultation bilaterally [Normal Oropharynx] : normal oropharynx [Abdomen Soft] : soft [Abdomen Tenderness] : non-tender [Nail Clubbing] : no clubbing of the fingernails [Abnormal Walk] : normal gait [Skin Color & Pigmentation] : normal skin color and pigmentation [Cyanosis, Localized] : no localized cyanosis [Affect] : the affect was normal [Oriented To Time, Place, And Person] : oriented to person, place, and time [Normal Rate] : normal [Rhythm Regular] : regular [Normal S1] : normal S1 [Normal S2] : normal S2 [S3] : no S3 [S4] : an S4 was heard [II] : a grade 2 [FreeTextEntry1] : 3+ lower extremity edema b/l R>L

## 2020-08-05 ENCOUNTER — APPOINTMENT (OUTPATIENT)
Dept: NEUROLOGY | Facility: CLINIC | Age: 55
End: 2020-08-05
Payer: MEDICAID

## 2020-08-05 PROCEDURE — 99213 OFFICE O/P EST LOW 20 MIN: CPT | Mod: 95
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
## 2020-08-05 NOTE — DATA REVIEWED
[de-identified] : EEG was performed in the hospital on 6/18/18.\par The study demonstrated some focal slowing over the left anterior temporal region. There was also spike and wave activity in the left frontotemporal region.\par  [de-identified] : Brain MRI was performed on 6/18/18 at [facility].\par There were some scattered foci of nonspecific gliosis which were unchanged from a prior study in December of 2017.\par

## 2020-08-05 NOTE — PHYSICAL EXAM
[FreeTextEntry1] : Examination:\par Examination is limited due to the nature of tele health visit. \par \par The patient is well appearing and in no acute distress. \par \par Neurological Examination: \par Mental status: The patient is awake, alert, and oriented. Attention span seems normal. Recent and remote memory seem intact. \par Cranial nerves: Extraocular motion is full. Face appears symmetric, Shoulder shrug is symmetric, tongue is midline. \par Motor: There is no pronator drift. Fine finger movement is intact. \par Sensory: Unable to test.\par Reflexes: Unable to test. \par Cerebellar: No finger nose dysmetria.\par \par Gait is grossly normal (within the confines of the tele health technology). \par \par There is some edema seen in the feet. \par

## 2020-08-05 NOTE — ASSESSMENT
[FreeTextEntry1] : This is a 55 year-old man who had an episode suggestive of seizure in June of 2018. \par EEG demonstrated a focal spike formation. This was left anterior temporal.\par \par He is currently on Depakote 500 mg twice per day.\par He has been seizure-free on this dosage for more than one year.\par He had recent blood tests in July of 2020 which included a liver profile which was unremarkable.\par \par I will see him back in 6 months.

## 2020-08-27 ENCOUNTER — APPOINTMENT (OUTPATIENT)
Dept: CARDIOLOGY | Facility: CLINIC | Age: 55
End: 2020-08-27
Payer: MEDICAID

## 2020-08-27 ENCOUNTER — APPOINTMENT (OUTPATIENT)
Dept: NEPHROLOGY | Facility: CLINIC | Age: 55
End: 2020-08-27
Payer: MEDICAID

## 2020-08-27 VITALS
SYSTOLIC BLOOD PRESSURE: 153 MMHG | OXYGEN SATURATION: 98 % | WEIGHT: 270 LBS | HEIGHT: 70 IN | DIASTOLIC BLOOD PRESSURE: 82 MMHG | HEART RATE: 85 BPM | BODY MASS INDEX: 38.65 KG/M2 | TEMPERATURE: 98.2 F

## 2020-08-27 PROCEDURE — G2066: CPT

## 2020-08-27 PROCEDURE — 93298 REM INTERROG DEV EVAL SCRMS: CPT

## 2020-08-27 PROCEDURE — 99496 TRANSJ CARE MGMT HIGH F2F 7D: CPT | Mod: 25

## 2020-08-27 PROCEDURE — 36415 COLL VENOUS BLD VENIPUNCTURE: CPT

## 2020-08-27 NOTE — REVIEW OF SYSTEMS
[Heart Rate Is Slow] : the heart rate was not slow [Heart Rate Is Fast] : the heart rate was not fast [Chest Pain] : no chest pain [Palpitations] : no palpitations [Leg Claudication] : no intermittent leg claudication [Lower Ext Edema] : lower extremity edema [Negative] : Heme/Lymph

## 2020-08-27 NOTE — PHYSICAL EXAM
[General Appearance - In No Acute Distress] : in no acute distress [General Appearance - Alert] : alert [Sclera] : the sclera and conjunctiva were normal [Outer Ear] : the ears and nose were normal in appearance [Neck Appearance] : the appearance of the neck was normal [Neck Cervical Mass (___cm)] : no neck mass was observed [] : no respiratory distress [Respiration, Rhythm And Depth] : normal respiratory rhythm and effort [Exaggerated Use Of Accessory Muscles For Inspiration] : no accessory muscle use [Apical Impulse] : the apical impulse was normal [Heart Sounds] : normal S1 and S2 [Heart Rate And Rhythm] : heart rate was normal and rhythm regular [Murmurs] : no murmurs [Heart Sounds Gallop] : no gallops [Heart Sounds Pericardial Friction Rub] : no pericardial rub [Bowel Sounds] : normal bowel sounds [FreeTextEntry1] : 2-3+ LE edema, right leg slightly greater than left [Abdomen Soft] : soft [Abdomen Tenderness] : non-tender [Abdomen Mass (___ Cm)] : no abdominal mass palpated [Involuntary Movements] : no involuntary movements were seen [Skin Color & Pigmentation] : normal skin color and pigmentation [No CVA Tenderness] : no ~M costovertebral angle tenderness [Impaired Insight] : insight and judgment were intact [Affect] : the affect was normal [Mood] : the mood was normal

## 2020-08-27 NOTE — ASSESSMENT
[FreeTextEntry1] : 1) CKD IV\par 2) HTN\par 3) DM\par 4) Edema\par 5) CHF\par \par Currently on hydralazine 25mg TID (started in hospital) ; off valsartan\par Would like him to come off hydralazine; once blood tests done and can assess renal fx to put him back on ACEI or ARB\par Not enough diuretic; worsening LE edema; will increase to torsemide 20mg daily;\par Will also consider increasing amlodipine on next visit\par Check new set of labs\par [facility]

## 2020-08-27 NOTE — HISTORY OF PRESENT ILLNESS
[FreeTextEntry1] : Initial Consultation\par \par Patient accompanied by his sister, Carissa, during appointment today\par \par PCP: Josiah Ward\par Cardiology: Tomas Tran\par Endocrinology: Reebcca Cabrera ([phone])\par Neurology: Dakota Chawla\par \par Social Hx: Born in [place]. Never smoker. Denies alcohol or drug use\par \par Fam. Hx: Denies family hx of kidney disease.\par \par PMH: hypertrophic cardiomyopathy, Left ventricular outflow tract obstruction, first-degree heart block, lower extremity edema, DM 2 (dx 2 years ago), HTN, seizure disorder, HLD, GERD\par \par Echocardiogram June 1, 2020 pressures left ventricle normal size and function with ejection fraction of 60-65%. Moderate concentric LVH noted. Moderate mitral regurgitation is noted which is a bit worse than his prior echo.\par \par Patient referred to our practice for worsening renal function. He has not seen a Nephrologist before. He feels well today but he has noticed worsening lower extremity swelling over the past two months. He was previously on Lasix but ran out of the medication and stopped taking it for a while. He was directed by Cardiologist to restart Lasix 40 mg BID at the end of June. Cardiologist also ordered lower extremity venous Doppler to rule out DVT - no evidence of DVT. Chest x-ray was done due to diminished breath sounds on the right - small right effusion. Patient states that he does not exercise at all. He denies dizziness, lightheadedness, chest pain, palpitations, syncope, dyspnea today.\par Patient follows with Endo. for DM control . Checks blood sugar levels three times a day, he said sometimes numbers go into 200's. He is unsure what is last hgb A1c level is. Patient said he has a history of hospitalizations due to diabetes.\par \par Current: [facility] ; discharged 8/23/20; f/u by me within 24 hours of discharge for appointment today; medications reviewed; currently off valsartan ; lasix 40mg daily changed to torsemide 5mg daily; cardiology increased on phone to 10mg daily; worsening LE edema;\par

## 2020-08-28 ENCOUNTER — APPOINTMENT (OUTPATIENT)
Dept: VASCULAR SURGERY | Facility: CLINIC | Age: 55
End: 2020-08-28
Payer: MEDICAID

## 2020-08-28 VITALS
WEIGHT: 270 LBS | BODY MASS INDEX: 38.65 KG/M2 | HEIGHT: 70 IN | SYSTOLIC BLOOD PRESSURE: 155 MMHG | OXYGEN SATURATION: 97 % | TEMPERATURE: 98 F | DIASTOLIC BLOOD PRESSURE: 77 MMHG | HEART RATE: 83 BPM

## 2020-08-28 PROCEDURE — 29580 STRAPPING UNNA BOOT: CPT | Mod: 50

## 2020-08-28 PROCEDURE — 93970 EXTREMITY STUDY: CPT

## 2020-08-28 PROCEDURE — 99203 OFFICE O/P NEW LOW 30 MIN: CPT | Mod: 25

## 2020-08-28 RX ORDER — VALSARTAN 40 MG/1
40 TABLET, COATED ORAL DAILY
Qty: 90 | Refills: 1 | Status: DISCONTINUED | COMMUNITY
Start: 2020-07-23 | End: 2020-08-28

## 2020-08-28 RX ORDER — MAGNESIUM OXIDE 241.3 MG/1000MG
400 TABLET ORAL DAILY
Qty: 90 | Refills: 1 | Status: DISCONTINUED | COMMUNITY
Start: 2020-06-19 | End: 2020-08-28

## 2020-08-28 RX ORDER — LORATADINE 10 MG/1
10 TABLET ORAL DAILY
Refills: 0 | Status: DISCONTINUED | COMMUNITY
End: 2020-08-28

## 2020-08-28 RX ORDER — INSULIN LISPRO 100 U/ML
100 INJECTION, SOLUTION SUBCUTANEOUS
Qty: 15 | Refills: 0 | Status: DISCONTINUED | COMMUNITY
Start: 2020-07-18 | End: 2020-08-28

## 2020-08-28 RX ORDER — AMMONIUM LACTATE 12 %
12 CREAM (GRAM) TOPICAL
Qty: 385 | Refills: 0 | Status: DISCONTINUED | COMMUNITY
Start: 2020-07-17 | End: 2020-08-28

## 2020-08-28 RX ORDER — NYSTATIN 100000 [USP'U]/G
100000 CREAM TOPICAL
Qty: 30 | Refills: 0 | Status: DISCONTINUED | COMMUNITY
Start: 2020-07-02 | End: 2020-08-28

## 2020-08-28 RX ORDER — FUROSEMIDE 40 MG/1
40 TABLET ORAL TWICE DAILY
Qty: 180 | Refills: 1 | Status: COMPLETED | COMMUNITY
End: 2020-08-28

## 2020-08-31 ENCOUNTER — NON-APPOINTMENT (OUTPATIENT)
Age: 55
End: 2020-08-31

## 2020-08-31 ENCOUNTER — APPOINTMENT (OUTPATIENT)
Dept: CARDIOLOGY | Facility: CLINIC | Age: 55
End: 2020-08-31
Payer: MEDICAID

## 2020-08-31 VITALS
BODY MASS INDEX: 37.08 KG/M2 | HEART RATE: 71 BPM | RESPIRATION RATE: 15 BRPM | DIASTOLIC BLOOD PRESSURE: 92 MMHG | WEIGHT: 259 LBS | HEIGHT: 70 IN | SYSTOLIC BLOOD PRESSURE: 168 MMHG

## 2020-08-31 PROCEDURE — 93000 ELECTROCARDIOGRAM COMPLETE: CPT

## 2020-08-31 PROCEDURE — 99214 OFFICE O/P EST MOD 30 MIN: CPT

## 2020-08-31 RX ORDER — METOPROLOL TARTRATE 100 MG/1
100 TABLET, FILM COATED ORAL
Qty: 180 | Refills: 0 | Status: COMPLETED | COMMUNITY
Start: 2020-06-18

## 2020-08-31 RX ORDER — ERGOCALCIFEROL 1.25 MG/1
1.25 MG CAPSULE ORAL
Refills: 0 | Status: DISCONTINUED | COMMUNITY
End: 2020-08-31

## 2020-08-31 RX ORDER — DOXAZOSIN 8 MG/1
8 TABLET ORAL TWICE DAILY
Qty: 90 | Refills: 0 | Status: DISCONTINUED | COMMUNITY
Start: 2020-03-02 | End: 2020-08-31

## 2020-08-31 RX ORDER — MAGNESIUM OXIDE 400 MG
400 (241.3 MG) TABLET ORAL
Qty: 30 | Refills: 0 | Status: COMPLETED | COMMUNITY
Start: 2020-06-19

## 2020-08-31 RX ORDER — TORSEMIDE 5 MG/1
5 TABLET ORAL
Qty: 30 | Refills: 0 | Status: COMPLETED | COMMUNITY
Start: 2020-08-24

## 2020-08-31 RX ORDER — GUAIFENESIN AND DEXTROMETHORPHAN HYDROBROMIDE 100; 10 MG/5ML; MG/5ML
100-10 SOLUTION ORAL
Qty: 236 | Refills: 0 | Status: COMPLETED | COMMUNITY
Start: 2020-08-24

## 2020-08-31 RX ORDER — INSULIN GLARGINE 100 [IU]/ML
100 INJECTION, SOLUTION SUBCUTANEOUS
Qty: 15 | Refills: 0 | Status: COMPLETED | COMMUNITY
Start: 2020-04-22

## 2020-08-31 RX ORDER — DIVALPROEX SODIUM 500 MG/1
500 TABLET, DELAYED RELEASE ORAL TWICE DAILY
Qty: 60 | Refills: 5 | Status: DISCONTINUED | COMMUNITY
End: 2020-08-31

## 2020-08-31 RX ORDER — DOXAZOSIN 4 MG/1
4 TABLET ORAL
Qty: 90 | Refills: 0 | Status: COMPLETED | COMMUNITY
Start: 2020-03-02

## 2020-08-31 RX ORDER — METOPROLOL TARTRATE 50 MG/1
50 TABLET, FILM COATED ORAL TWICE DAILY
Qty: 180 | Refills: 1 | Status: DISCONTINUED | COMMUNITY
End: 2020-08-31

## 2020-08-31 RX ORDER — LATANOPROST/PF 0.005 %
0.01 DROPS OPHTHALMIC (EYE)
Qty: 2 | Refills: 0 | Status: COMPLETED | COMMUNITY
Start: 2020-04-14

## 2020-08-31 RX ORDER — ERGOCALCIFEROL 1.25 MG/1
1.25 MG CAPSULE, LIQUID FILLED ORAL
Qty: 4 | Refills: 0 | Status: DISCONTINUED | COMMUNITY
Start: 2020-07-13 | End: 2020-08-31

## 2020-08-31 RX ORDER — METOPROLOL TARTRATE 25 MG/1
25 TABLET, FILM COATED ORAL
Qty: 180 | Refills: 0 | Status: COMPLETED | COMMUNITY
Start: 2020-04-25

## 2020-08-31 RX ORDER — KETOCONAZOLE 20 MG/G
2 CREAM TOPICAL
Qty: 60 | Refills: 0 | Status: COMPLETED | COMMUNITY
Start: 2020-08-26

## 2020-09-01 ENCOUNTER — APPOINTMENT (OUTPATIENT)
Dept: NEPHROLOGY | Facility: CLINIC | Age: 55
End: 2020-09-01
Payer: MEDICAID

## 2020-09-01 VITALS
HEIGHT: 71 IN | BODY MASS INDEX: 36.68 KG/M2 | SYSTOLIC BLOOD PRESSURE: 144 MMHG | WEIGHT: 262 LBS | TEMPERATURE: 97.8 F | DIASTOLIC BLOOD PRESSURE: 78 MMHG | HEART RATE: 80 BPM

## 2020-09-01 DIAGNOSIS — N04.9 NEPHROTIC SYNDROME WITH UNSPECIFIED MORPHOLOGIC CHANGES: ICD-10-CM

## 2020-09-01 LAB
24R-OH-CALCIDIOL SERPL-MCNC: 37.3 PG/ML
25(OH)D3 SERPL-MCNC: 14.5 NG/ML
ALBUMIN SERPL ELPH-MCNC: 3.5 G/DL
ANION GAP SERPL CALC-SCNC: 11 MMOL/L
APPEARANCE: CLEAR
BACTERIA: NEGATIVE
BASOPHILS # BLD AUTO: 0.04 K/UL
BASOPHILS NFR BLD AUTO: 0.6 %
BILIRUBIN URINE: NEGATIVE
BLOOD URINE: NORMAL
BUN SERPL-MCNC: 46 MG/DL
CALCIUM SERPL-MCNC: 8.3 MG/DL
CALCIUM SERPL-MCNC: 8.3 MG/DL
CHLORIDE SERPL-SCNC: 106 MMOL/L
CO2 SERPL-SCNC: 24 MMOL/L
COLOR: NORMAL
CREAT SERPL-MCNC: 2.27 MG/DL
CREAT SPEC-SCNC: 74 MG/DL
CREAT/PROT UR: 6.9 RATIO
EOSINOPHIL # BLD AUTO: 0.28 K/UL
EOSINOPHIL NFR BLD AUTO: 4.4 %
ESTIMATED AVERAGE GLUCOSE: 146 MG/DL
GLUCOSE QUALITATIVE U: ABNORMAL
GLUCOSE SERPL-MCNC: 151 MG/DL
HBA1C MFR BLD HPLC: 6.7 %
HCT VFR BLD CALC: 30.5 %
HGB BLD-MCNC: 9.6 G/DL
HYALINE CASTS: 0 /LPF
IMM GRANULOCYTES NFR BLD AUTO: 0.5 %
KETONES URINE: NEGATIVE
LEUKOCYTE ESTERASE URINE: NEGATIVE
LYMPHOCYTES # BLD AUTO: 1.52 K/UL
LYMPHOCYTES NFR BLD AUTO: 23.8 %
MAN DIFF?: NORMAL
MCHC RBC-ENTMCNC: 28.7 PG
MCHC RBC-ENTMCNC: 31.5 GM/DL
MCV RBC AUTO: 91 FL
MICROSCOPIC-UA: NORMAL
MONOCYTES # BLD AUTO: 0.48 K/UL
MONOCYTES NFR BLD AUTO: 7.5 %
NEUTROPHILS # BLD AUTO: 4.04 K/UL
NEUTROPHILS NFR BLD AUTO: 63.2 %
NITRITE URINE: NEGATIVE
PARATHYROID HORMONE INTACT: 63 PG/ML
PH URINE: 6.5
PHOSPHATE SERPL-MCNC: 3.9 MG/DL
PLATELET # BLD AUTO: 256 K/UL
POTASSIUM SERPL-SCNC: 5.1 MMOL/L
PROT UR-MCNC: 514 MG/DL
PROTEIN URINE: ABNORMAL
RBC # BLD: 3.35 M/UL
RBC # FLD: 13.2 %
RED BLOOD CELLS URINE: 2 /HPF
SODIUM SERPL-SCNC: 142 MMOL/L
SPECIFIC GRAVITY URINE: 1.01
SQUAMOUS EPITHELIAL CELLS: 2 /HPF
UROBILINOGEN URINE: NORMAL
WBC # FLD AUTO: 6.39 K/UL
WHITE BLOOD CELLS URINE: 2 /HPF

## 2020-09-01 PROCEDURE — 36415 COLL VENOUS BLD VENIPUNCTURE: CPT

## 2020-09-01 PROCEDURE — 99215 OFFICE O/P EST HI 40 MIN: CPT | Mod: 25

## 2020-09-01 NOTE — HISTORY OF PRESENT ILLNESS
[FreeTextEntry1] : Patient accompanied by his sister, Carissa, during appointment today\par PCP: Josiah Ward\par Cardiology: Tomas Tran\par Endocrinology: Rebecca Cabrera ([phone])\par Neurology: Dakota Chawla\par Social Hx: Born in [place]. Never smoker. Denies alcohol or drug use\par Fam. Hx: Denies family hx of kidney disease.\par PMH: hypertrophic cardiomyopathy, Left ventricular outflow tract obstruction, first-degree heart block, lower extremity edema, DM 2 (dx 2 years ago), HTN, seizure disorder, HLD, GERD\par Echocardiogram June 1, 2020 pressures left ventricle normal size and function with ejection fraction of 60-65%. Moderate concentric LVH noted. Moderate mitral regurgitation is noted which is a bit worse than his prior echo.\par Patient referred to our practice for worsening renal function. He has not seen a Nephrologist before. He feels well today but he has noticed worsening lower extremity swelling over the past two months. He was previously on Lasix but ran out of the medication and stopped taking it for a while. He was directed by Cardiologist to restart Lasix 40 mg BID at the end of June. Cardiologist also ordered lower extremity venous Doppler to rule out DVT - no evidence of DVT. Chest x-ray was done due to diminished breath sounds on the right - small right effusion. Patient states that he does not exercise at all. He denies dizziness, lightheadedness, chest pain, palpitations, syncope, dyspnea today.\par Patient follows with Endo. for DM control . Checks blood sugar levels three times a day, he said sometimes numbers go into 200's. He is unsure what is last hgb A1c level is. Patient said he has a history of hospitalizations due to diabetes.\par Pt was hospitalized at [facility] ; discharged 8/23/20; f/u by me within 24 hours of discharge for appointment today; medications reviewed; currently off valsartan ; lasix 40mg daily changed to torsemide 5mg daily; cardiology increased on phone to 10mg daily; worsening LE edema;\par \par Current: Pt seen/examined for close follow up. Saw Dr. Tran on Friday. Medications reviewed; still taking hydralazine. BP acceptable. Legs wrapped; still edematous; reports feeling better. Labs reviewed in detail with patient and sister Carissa.

## 2020-09-01 NOTE — PHYSICAL EXAM
[General Appearance - Alert] : alert [General Appearance - In No Acute Distress] : in no acute distress [Sclera] : the sclera and conjunctiva were normal [Outer Ear] : the ears and nose were normal in appearance [Neck Appearance] : the appearance of the neck was normal [Neck Cervical Mass (___cm)] : no neck mass was observed [] : no respiratory distress [Respiration, Rhythm And Depth] : normal respiratory rhythm and effort [Exaggerated Use Of Accessory Muscles For Inspiration] : no accessory muscle use [Apical Impulse] : the apical impulse was normal [Heart Rate And Rhythm] : heart rate was normal and rhythm regular [Heart Sounds] : normal S1 and S2 [Heart Sounds Gallop] : no gallops [Murmurs] : no murmurs [Heart Sounds Pericardial Friction Rub] : no pericardial rub [FreeTextEntry1] : 2-3+ LE edema, right leg slightly greater than left [Bowel Sounds] : normal bowel sounds [Abdomen Soft] : soft [Abdomen Tenderness] : non-tender [Abdomen Mass (___ Cm)] : no abdominal mass palpated [No CVA Tenderness] : no ~M costovertebral angle tenderness [Involuntary Movements] : no involuntary movements were seen [Skin Color & Pigmentation] : normal skin color and pigmentation [Impaired Insight] : insight and judgment were intact [Affect] : the affect was normal [Mood] : the mood was normal

## 2020-09-01 NOTE — ASSESSMENT
[FreeTextEntry1] : 1) CKD IV\par 2) HTN\par 3) DM\par 4) Edema\par 5) CHF\par \par On torsemide 20mg BID now\par STOP Hydralazine; reiterated to sister Carissa;\par Spoke with Dr. Tran; pt had 24 hour urine checked at [place]; had 10gm proteinuria; is hypoalbuminemic; spot ratio here 6.9 this past Thursday\par Needs renal biopsy ASAP; OK to hold aspirin for 5 days prior per Dr. Tran;\par Will check full serologic workup; Diabetic nephropathy v other etiology; biopsy will determine;\par Coordinated with interventional radiology; spoke with [facility] on phone; to be done as soon as possible;\par Considering referral to GN center at [facility] under Dr Oscar Lopez once biopsy results;\par RTC 2 weeks

## 2020-09-02 ENCOUNTER — LABORATORY RESULT (OUTPATIENT)
Age: 55
End: 2020-09-02

## 2020-09-02 LAB
APTT BLD: 31 SEC
C3 SERPL-MCNC: 105 MG/DL
C4 SERPL-MCNC: 25 MG/DL
DEPRECATED KAPPA LC FREE/LAMBDA SER: 1.96 RATIO
DSDNA AB SER-ACNC: <12 IU/ML
HAPTOGLOB SERPL-MCNC: 222 MG/DL
HBV CORE IGM SER QL: NONREACTIVE
HBV SURFACE AB SER QL: NONREACTIVE
HBV SURFACE AG SER QL: NONREACTIVE
INR PPP: 0.99 RATIO
KAPPA LC CSF-MCNC: 5.97 MG/DL
KAPPA LC SERPL-MCNC: 11.73 MG/DL
LDH SERPL-CCNC: 193 U/L
M PROTEIN SPEC IFE-MCNC: NORMAL
PT BLD: 11.7 SEC

## 2020-09-03 LAB — RPR SER-TITR: NORMAL

## 2020-09-04 ENCOUNTER — APPOINTMENT (OUTPATIENT)
Dept: VASCULAR SURGERY | Facility: CLINIC | Age: 55
End: 2020-09-04
Payer: MEDICAID

## 2020-09-04 VITALS
DIASTOLIC BLOOD PRESSURE: 89 MMHG | BODY MASS INDEX: 36.68 KG/M2 | WEIGHT: 262 LBS | TEMPERATURE: 97.4 F | HEIGHT: 71 IN | OXYGEN SATURATION: 95 % | HEART RATE: 82 BPM | SYSTOLIC BLOOD PRESSURE: 165 MMHG

## 2020-09-04 DIAGNOSIS — I89.0 LYMPHEDEMA, NOT ELSEWHERE CLASSIFIED: ICD-10-CM

## 2020-09-04 PROCEDURE — 99213 OFFICE O/P EST LOW 20 MIN: CPT

## 2020-09-08 LAB
ANA SER IF-ACNC: NEGATIVE
GBM AB TITR SER IF: <1 AL
MPO AB + PR3 PNL SER: NORMAL
PHOSPHOLIPASE A2 RECEPTOR ELISA: <2 RU/ML
PHOSPHOLIPASE A2 RECEPTOR IFA: NEGATIVE

## 2020-09-08 NOTE — PHYSICAL EXAM
[General Appearance - In No Acute Distress] : no acute distress [Normal Conjunctiva] : the conjunctiva exhibited no abnormalities [Normal Oropharynx] : normal oropharynx [Normal Oral Mucosa] : normal oral mucosa [Auscultation Breath Sounds / Voice Sounds] : lungs were clear to auscultation bilaterally [Abdomen Soft] : soft [Abdomen Tenderness] : non-tender [Abnormal Walk] : normal gait [Nail Clubbing] : no clubbing of the fingernails [Cyanosis, Localized] : no localized cyanosis [Skin Color & Pigmentation] : normal skin color and pigmentation [Oriented To Time, Place, And Person] : oriented to person, place, and time [Affect] : the affect was normal [Normal Rate] : normal [Rhythm Regular] : regular [Normal S1] : normal S1 [Normal S2] : normal S2 [II] : a grade 2 [S4] : an S4 was heard [S3] : no S3 [FreeTextEntry1] : Legs wrapped bilaterally.

## 2020-09-08 NOTE — DISCUSSION/SUMMARY
[FreeTextEntry1] : 1. Continue current cardiac meds in doses as noted above for hypertrophic cardiomyopathy and hypertension and dyslipidemia. \par 2. Followup with nephrology for further workup of nephrotic syndrome and treatment.\par 3. Follow up with vascular for his edema. Encourage elevation of his legs.\par 4. Monitor BP at home, keep a log and bring to f/u.\par 5. Continue monitoring of loop recorder and looking for additional pauses. No indication for pacemaker at this time. Consider sleep study as many of his pauses seem to occur while he is sleeping.\par 6. Patient strongly encouraged on reducing salt intake.\par 7. Encourage increased protein intake and protein supplementation.\par 8. Follow up here in one month.

## 2020-09-08 NOTE — HISTORY OF PRESENT ILLNESS
[FreeTextEntry1] : Patient presents back to the office today having recently been in the hospital where he presented with shortness of breath and worsening edema. During the hospitalization he was aggressively diuresed with some improvement. Echocardiogram again demonstrated a normal EF with LVH. It appeared to be more concentric on this study. Further evaluation from a renal perspective included a 24-hour urine which showed 10g protein loss consistent with nephrotic syndrome. He was offered a biopsy but refused. She since has seen vascular and was placed on leg wraps and is getting a compression pump for his edema. His breathing is improving. He reports no other new symptoms. Patient denies chest pain, palpitations, orthopnea, presyncope, syncope.

## 2020-09-08 NOTE — ADDENDUM
[FreeTextEntry1] : Patient will have a sleep study scheduled to evaluate his loud snoring and because of his obesity (BMI 35.54) to rule out sleep apnea.

## 2020-09-08 NOTE — ASSESSMENT
[FreeTextEntry1] : Echocardiogram June 1, 2020 pressures left ventricle normal size and function with ejection fraction of 60-65%. Moderate concentric LVH noted. Moderate mitral regurgitation is noted which is a bit worse than his prior echo.\par \par EKG: Sinus rhythm with first-degree heart block. Single blocked sinus beat noted. No significant ST or T wave changes.\par \par 55-year-old man with a past medical history of hypertrophic cardiomyopathy, hypertension, diabetes who presents to me for followup evaluation.  Patient was recently in the hospital with once again worsening edema and shortness of breath. There was likely a mild component of heart failure but most of his edema appears to be secondary to nephrotic syndrome at this time. The cause of that is unclear as his protein losses excessive for his diabetes. His albumin was also noted to be significantly low. There may also be a dietary component. At this time I do not believe heart failure is the primary component in his symptomatology. His edema is being treated with leg wraps but ultimately further workup and treatment from a nephrology perspective would likely be most helpful. Blood pressure is elevated here today but he is on lower dose beta blocker at this time. I will defer to nephrology with regard to his antihypertensive and diuretic therapy. His monitor continues to show occasional second degree heart block but mostly first degree and this is asymptomatic.

## 2020-09-09 ENCOUNTER — OUTPATIENT (OUTPATIENT)
Dept: OUTPATIENT SERVICES | Facility: HOSPITAL | Age: 55
LOS: 1 days | End: 2020-09-09
Payer: COMMERCIAL

## 2020-09-09 VITALS
HEIGHT: 71 IN | DIASTOLIC BLOOD PRESSURE: 91 MMHG | HEART RATE: 95 BPM | TEMPERATURE: 98 F | RESPIRATION RATE: 20 BRPM | WEIGHT: 266.54 LBS | SYSTOLIC BLOOD PRESSURE: 190 MMHG

## 2020-09-09 DIAGNOSIS — Z95.0 PRESENCE OF CARDIAC PACEMAKER: Chronic | ICD-10-CM

## 2020-09-09 DIAGNOSIS — I44.0 ATRIOVENTRICULAR BLOCK, FIRST DEGREE: ICD-10-CM

## 2020-09-09 DIAGNOSIS — Z98.890 OTHER SPECIFIED POSTPROCEDURAL STATES: Chronic | ICD-10-CM

## 2020-09-09 DIAGNOSIS — I10 ESSENTIAL (PRIMARY) HYPERTENSION: ICD-10-CM

## 2020-09-09 DIAGNOSIS — N18.9 CHRONIC KIDNEY DISEASE, UNSPECIFIED: ICD-10-CM

## 2020-09-09 DIAGNOSIS — N04.9 NEPHROTIC SYNDROME WITH UNSPECIFIED MORPHOLOGIC CHANGES: ICD-10-CM

## 2020-09-09 DIAGNOSIS — Z13.89 ENCOUNTER FOR SCREENING FOR OTHER DISORDER: ICD-10-CM

## 2020-09-09 DIAGNOSIS — R60.0 LOCALIZED EDEMA: ICD-10-CM

## 2020-09-09 DIAGNOSIS — E11.9 TYPE 2 DIABETES MELLITUS WITHOUT COMPLICATIONS: ICD-10-CM

## 2020-09-09 DIAGNOSIS — Z01.818 ENCOUNTER FOR OTHER PREPROCEDURAL EXAMINATION: ICD-10-CM

## 2020-09-09 DIAGNOSIS — Z29.9 ENCOUNTER FOR PROPHYLACTIC MEASURES, UNSPECIFIED: ICD-10-CM

## 2020-09-09 LAB
ANION GAP SERPL CALC-SCNC: 11 MMOL/L — SIGNIFICANT CHANGE UP (ref 5–17)
APTT BLD: 29.4 SEC — SIGNIFICANT CHANGE UP (ref 27.5–35.5)
BUN SERPL-MCNC: 38 MG/DL — HIGH (ref 8–20)
CALCIUM SERPL-MCNC: 8 MG/DL — LOW (ref 8.6–10.2)
CHLORIDE SERPL-SCNC: 105 MMOL/L — SIGNIFICANT CHANGE UP (ref 98–107)
CO2 SERPL-SCNC: 24 MMOL/L — SIGNIFICANT CHANGE UP (ref 22–29)
CREAT SERPL-MCNC: 1.98 MG/DL — HIGH (ref 0.5–1.3)
GLUCOSE SERPL-MCNC: 229 MG/DL — HIGH (ref 70–99)
HCT VFR BLD CALC: 30.6 % — LOW (ref 39–50)
HGB BLD-MCNC: 10.3 G/DL — LOW (ref 13–17)
INR BLD: 1.06 RATIO — SIGNIFICANT CHANGE UP (ref 0.88–1.16)
MCHC RBC-ENTMCNC: 28.6 PG — SIGNIFICANT CHANGE UP (ref 27–34)
MCHC RBC-ENTMCNC: 33.7 GM/DL — SIGNIFICANT CHANGE UP (ref 32–36)
MCV RBC AUTO: 85 FL — SIGNIFICANT CHANGE UP (ref 80–100)
PLATELET # BLD AUTO: 201 K/UL — SIGNIFICANT CHANGE UP (ref 150–400)
POTASSIUM SERPL-MCNC: 5.2 MMOL/L — SIGNIFICANT CHANGE UP (ref 3.5–5.3)
POTASSIUM SERPL-SCNC: 5.2 MMOL/L — SIGNIFICANT CHANGE UP (ref 3.5–5.3)
PROTHROM AB SERPL-ACNC: 12.3 SEC — SIGNIFICANT CHANGE UP (ref 10.6–13.6)
RBC # BLD: 3.6 M/UL — LOW (ref 4.2–5.8)
RBC # FLD: 12.8 % — SIGNIFICANT CHANGE UP (ref 10.3–14.5)
SARS-COV-2 RNA SPEC QL NAA+PROBE: SIGNIFICANT CHANGE UP
SODIUM SERPL-SCNC: 140 MMOL/L — SIGNIFICANT CHANGE UP (ref 135–145)
WBC # BLD: 5.64 K/UL — SIGNIFICANT CHANGE UP (ref 3.8–10.5)
WBC # FLD AUTO: 5.64 K/UL — SIGNIFICANT CHANGE UP (ref 3.8–10.5)

## 2020-09-09 PROCEDURE — 36415 COLL VENOUS BLD VENIPUNCTURE: CPT

## 2020-09-09 PROCEDURE — U0003: CPT

## 2020-09-09 PROCEDURE — 85027 COMPLETE CBC AUTOMATED: CPT

## 2020-09-09 PROCEDURE — G0463: CPT

## 2020-09-09 PROCEDURE — 85730 THROMBOPLASTIN TIME PARTIAL: CPT

## 2020-09-09 PROCEDURE — 80048 BASIC METABOLIC PNL TOTAL CA: CPT

## 2020-09-09 PROCEDURE — 85610 PROTHROMBIN TIME: CPT

## 2020-09-09 RX ORDER — SODIUM CHLORIDE 9 MG/ML
3 INJECTION INTRAMUSCULAR; INTRAVENOUS; SUBCUTANEOUS ONCE
Refills: 0 | Status: DISCONTINUED | OUTPATIENT
Start: 2020-09-11 | End: 2020-09-25

## 2020-09-09 RX ORDER — METOPROLOL TARTRATE 50 MG
1 TABLET ORAL
Qty: 0 | Refills: 0 | DISCHARGE

## 2020-09-09 RX ORDER — INSULIN ASPART 100 [IU]/ML
0 INJECTION, SOLUTION SUBCUTANEOUS
Qty: 0 | Refills: 0 | DISCHARGE

## 2020-09-09 RX ORDER — ATORVASTATIN CALCIUM 80 MG/1
1 TABLET, FILM COATED ORAL
Qty: 0 | Refills: 0 | DISCHARGE

## 2020-09-09 RX ORDER — INSULIN DEGLUDEC 100 U/ML
42 INJECTION, SOLUTION SUBCUTANEOUS
Qty: 0 | Refills: 0 | DISCHARGE

## 2020-09-09 RX ORDER — IBUPROFEN 200 MG
1 TABLET ORAL
Qty: 0 | Refills: 0 | DISCHARGE

## 2020-09-09 NOTE — H&P PST ADULT - NSICDXPASTMEDICALHX_GEN_ALL_CORE_FT
PAST MEDICAL HISTORY:  1st degree AV block     Bilateral lower extremity edema     Chronic CHF     Diabetes     HTN (hypertension) PAST MEDICAL HISTORY:  1st degree AV block     Bilateral lower extremity edema     Chronic CHF     Chronic renal disease     Diabetes     HTN (hypertension)

## 2020-09-09 NOTE — H&P PST ADULT - NSICDXPASTSURGICALHX_GEN_ALL_CORE_FT
No significant past surgical history PAST SURGICAL HISTORY:  No significant past surgical history PAST SURGICAL HISTORY:  History of loop recorder

## 2020-09-09 NOTE — H&P PST ADULT - NSICDXFAMILYHX_GEN_ALL_CORE_FT
FAMILY HISTORY:  FH: CAD (coronary artery disease), father  FH: diabetes mellitus, mother  FH: hypertension, father

## 2020-09-09 NOTE — H&P PST ADULT - NSICDXPROBLEM_GEN_ALL_CORE_FT
PROBLEM DIAGNOSES  Problem: Screening for substance abuse  Assessment and Plan: ORT 0 low risk    Problem: Nephrotic syndrome  Assessment and Plan: scheduled for Us renal biopsy in radiology    Problem: Need for prophylactic measure  Assessment and Plan:     Problem: 1st degree AV block  Assessment and Plan: continue medications as prescribed    Problem: Bilateral lower extremity edema  Assessment and Plan: weekly wrapping of legs with PMD    Problem: Diabetes  Assessment and Plan: conitnue insluin as orders FS on admit     Problem: HTN (hypertension)  Assessment and Plan: continue medications as prescribed    Problem: Chronic renal disease  Assessment and Plan: renal biopsy

## 2020-09-09 NOTE — H&P PST ADULT - HISTORY OF PRESENT ILLNESS
54yo male present to PST c/o bilateral lower extremity edema x 2 months. Pt is poor historian. Reports increased swelling to bilateral lower legs,  has been wrapping with ace bandages weekly with improvement in edema. As per history in the computer pt had US of kidney   7/27/20 (negative), B/L US of legs 7/9/20 (negative for DVT), CXR  7/9/20 showed loop recorder. PMH of chronic renal disease, HTN, DM, seizures (last seizure more than 3 years ago). Scheduled for US guided renal biopsy without anesthesia 9/11/20.

## 2020-09-09 NOTE — ASU PATIENT PROFILE, ADULT - LEARNING ASSESSMENT (PATIENT) ADDITIONAL COMMENTS
Instructed pt on pre-op instructions/teaching, tips for safer surgery, pain management scale, pre-surgical infection prevention instructions, Covid Swab done today and sent to [facility] lab, diabetes club schedule given. Pt verbalized understanding of all instructions given.

## 2020-09-09 NOTE — H&P PST ADULT - NSANTHOSAYNRD_GEN_A_CORE
No. YOSVANY screening performed.  STOP BANG Legend: 0-2 = LOW Risk; 3-4 = INTERMEDIATE Risk; 5-8 = HIGH Risk

## 2020-09-09 NOTE — H&P PST ADULT - ASSESSMENT
54yo male present to PST c/o bilateral lower extremity edema x 2 months. Pt is poor historian. Reports increased swelling to bilateral lower legs,  has been wrapping with ace bandages weekly with improvement in edema. As per history in the computer pt had US of kidney   20 (negative), B/L US of legs 20 (negative for DVT), CXR  20 showed loop recorder. PMH of chronic renal disease, HTN, DM, seizures (last seizure more than 3 years ago). Scheduled for US guided renal biopsy without anesthesia 20.  CAPRINI SCORE [CLOT]    AGE RELATED RISK FACTORS                                                       MOBILITY RELATED FACTORS  [x ] Age 41-60 years                                            (1 Point)                  [ ] Bed rest                                                        (1 Point)  [ ] Age: 61-74 years                                           (2 Points)                 [ ] Plaster cast                                                   (2 Points)  [ ] Age= 75 years                                              (3 Points)                 [ ] Bed bound for more than 72 hours                 (2 Points)  OPIOID RISK TOOL    PROMISE EACH BOX THAT APPLIES AND ADD TOTALS AT THE END    FAMILY HISTORY OF SUBSTANCE ABUSE                 FEMALE         MALE                                                Alcohol                             [  ]1 pt          [  ]3pts                                               Illegal Durgs                     [  ]2 pts        [  ]3pts                                               Rx Drugs                           [  ]4 pts        [  ]4 pts    PERSONAL HISTORY OF SUBSTANCE ABUSE                                                                                          Alcohol                             [  ]3 pts       [  ]3 pts                                               Illegal Drugs                     [  ]4 pts        [  ]4 pts                                               Rx Drugs                           [  ]5 pts        [  ]5 pts    AGE BETWEEN 16-45 YEARS                                      [  ]1 pt         [  ]1 pt    HISTORY OF PREADOLESCENT   SEXUAL ABUSE                                                             [  ]3 pts        [  ]0pts    PSYCHOLOGICAL DISEASE                     ADD, OCD, Bipolar, Schizophrenia        [  ]2 pts         [  ]2 pts                      Depression                                               [  ]1 pt           [  ]1 pt           SCORING TOTAL   (add numbers and type here)              (**0*)                                     A score of 3 or lower indicated LOW risk for future opioid abuse  A score of 4 to 7 indicated moderate risk for future opioid abuse  A score of 8 or higher indicates a high risk for opioid abuse        DISEASE RELATED RISK FACTORS                                               GENDER SPECIFIC FACTORS  [x ] Edema in the lower extremities                       (1 Point)                  [ ] Pregnancy                                                     (1 Point)  [ ] Varicose veins                                               (1 Point)                  [ ] Post-partum < 6 weeks                                   (1 Point)             [x ] BMI > 25 Kg/m2                                            (1 Point)                  [ ] Hormonal therapy  or oral contraception          (1 Point)                 [ ] Sepsis (in the previous month)                        (1 Point)                  [ ] History of pregnancy complications                 (1 point)  [ ] Pneumonia or serious lung disease                                               [ ] Unexplained or recurrent                     (1 Point)           (in the previous month)                               (1 Point)  [ ] Abnormal pulmonary function test                     (1 Point)                 SURGERY RELATED RISK FACTORS  [ ] Acute myocardial infarction                              (1 Point)                 [ ]  Section                                             (1 Point)  [ ] Congestive heart failure (in the previous month)  (1 Point)               [x ] Minor surgery                                                  (1 Point)   [ ] Inflammatory bowel disease                             (1 Point)                 [ ] Arthroscopic surgery                                        (2 Points)  [ ] Central venous access                                      (2 Points)                [ ] General surgery lasting more than 45 minutes   (2 Points)       [ ] Stroke (in the previous month)                          (5 Points)               [ ] Elective arthroplasty                                         (5 Points)                                                                                                                                               HEMATOLOGY RELATED FACTORS                                                 TRAUMA RELATED RISK FACTORS  [ ] Prior episodes of VTE                                     (3 Points)                [ ] Fracture of the hip, pelvis, or leg                       (5 Points)  [ ] Positive family history for VTE                         (3 Points)                 [ ] Acute spinal cord injury (in the previous month)  (5 Points)  [ ] Prothrombin 46132 A                                     (3 Points)                 [ ] Paralysis  (less than 1 month)                             (5 Points)  [ ] Factor V Leiden                                             (3 Points)                  [ ] Multiple Trauma within 1 month                        (5 Points)  [ ] Lupus anticoagulants                                     (3 Points)                                                           [ ] Anticardiolipin antibodies                               (3 Points)                                                       [ ] High homocysteine in the blood                      (3 Points)                                             [ ] Other congenital or acquired thrombophilia      (3 Points)                                                [ ] Heparin induced thrombocytopenia                  (3 Points)                                          Total Score [          ]    Caprini Score 0 - 2:  Low Risk, No VTE Prophylaxis required for most patients, encourage ambulation  Caprini Score 3 - 6:  At Risk, pharmacologic VTE prophylaxis is indicated for most patients (in the absence of a contraindication)  Caprini Score Greater than or = 7:  High Risk, pharmacologic VTE prophylaxis is indicated for most patients (in the absence of a contraindication)

## 2020-09-11 ENCOUNTER — OUTPATIENT (OUTPATIENT)
Dept: OUTPATIENT SERVICES | Facility: HOSPITAL | Age: 55
LOS: 1 days | End: 2020-09-11
Payer: COMMERCIAL

## 2020-09-11 ENCOUNTER — RESULT REVIEW (OUTPATIENT)
Age: 55
End: 2020-09-11

## 2020-09-11 VITALS
SYSTOLIC BLOOD PRESSURE: 209 MMHG | RESPIRATION RATE: 18 BRPM | HEART RATE: 83 BPM | HEIGHT: 71 IN | OXYGEN SATURATION: 99 % | WEIGHT: 270.07 LBS | DIASTOLIC BLOOD PRESSURE: 94 MMHG | TEMPERATURE: 98 F

## 2020-09-11 VITALS
OXYGEN SATURATION: 99 % | HEART RATE: 87 BPM | SYSTOLIC BLOOD PRESSURE: 167 MMHG | RESPIRATION RATE: 16 BRPM | DIASTOLIC BLOOD PRESSURE: 74 MMHG

## 2020-09-11 DIAGNOSIS — N04.9 NEPHROTIC SYNDROME WITH UNSPECIFIED MORPHOLOGIC CHANGES: ICD-10-CM

## 2020-09-11 DIAGNOSIS — Z98.890 OTHER SPECIFIED POSTPROCEDURAL STATES: Chronic | ICD-10-CM

## 2020-09-11 LAB
GLUCOSE BLDC GLUCOMTR-MCNC: 102 MG/DL — HIGH (ref 70–99)
GLUCOSE BLDC GLUCOMTR-MCNC: 109 MG/DL — HIGH (ref 70–99)
HCT VFR BLD CALC: 28.9 % — LOW (ref 39–50)
HGB BLD-MCNC: 10 G/DL — LOW (ref 13–17)

## 2020-09-11 PROCEDURE — 85018 HEMOGLOBIN: CPT

## 2020-09-11 PROCEDURE — 50200 RENAL BIOPSY PERQ: CPT | Mod: RT

## 2020-09-11 PROCEDURE — 88305 TISSUE EXAM BY PATHOLOGIST: CPT | Mod: 26

## 2020-09-11 PROCEDURE — 88348 ELECTRON MICROSCOPY DX: CPT

## 2020-09-11 PROCEDURE — 36415 COLL VENOUS BLD VENIPUNCTURE: CPT

## 2020-09-11 PROCEDURE — 88346 IMFLUOR 1ST 1ANTB STAIN PX: CPT | Mod: 26

## 2020-09-11 PROCEDURE — 88348 ELECTRON MICROSCOPY DX: CPT | Mod: 26

## 2020-09-11 PROCEDURE — 82962 GLUCOSE BLOOD TEST: CPT

## 2020-09-11 PROCEDURE — 88312 SPECIAL STAINS GROUP 1: CPT

## 2020-09-11 PROCEDURE — 88313 SPECIAL STAINS GROUP 2: CPT | Mod: 26

## 2020-09-11 PROCEDURE — 88312 SPECIAL STAINS GROUP 1: CPT | Mod: 26

## 2020-09-11 PROCEDURE — 88346 IMFLUOR 1ST 1ANTB STAIN PX: CPT

## 2020-09-11 PROCEDURE — 76942 ECHO GUIDE FOR BIOPSY: CPT

## 2020-09-11 PROCEDURE — 85014 HEMATOCRIT: CPT

## 2020-09-11 PROCEDURE — 88305 TISSUE EXAM BY PATHOLOGIST: CPT

## 2020-09-11 PROCEDURE — 88350 IMFLUOR EA ADDL 1ANTB STN PX: CPT | Mod: 26

## 2020-09-11 PROCEDURE — 88313 SPECIAL STAINS GROUP 2: CPT

## 2020-09-11 PROCEDURE — 88350 IMFLUOR EA ADDL 1ANTB STN PX: CPT

## 2020-09-11 NOTE — PROGRESS NOTE ADULT - SUBJECTIVE AND OBJECTIVE BOX
IR Post Procedure Note    Diagnosis: Proteinurua    Procedure: Random Renal Biopsy    : Solomon Babin MD    Contrast: None    Anesthesia: 1% Lidocaine Subcutaneous, 50mcg fentanyl, 20 mg hydralazine, 5 mg metoprolol    Estimated Blood Loss: Less than 10cc    Specimens: Specimen adequacy confirmed by Cytopathology, Specimens identified, labeled, confirmed and sent to lab.    Complications: No Immediate Complications    Anticoagulation: Resume in 48 Hours    Findings & Plan: 4 18g core bx of Right kidney obtained w Corvocet needle under US guidance. Sample adequacy confirmed by cytopathology. No hematoma or complications on post procedure US.      Please call Interventional Radiology with any questions, concerns, or issues.

## 2020-09-11 NOTE — ASU DISCHARGE PLAN (ADULT/PEDIATRIC) - ASU DC SPECIAL INSTRUCTIONSFT
Patient returning call. Patient already scheduled for EGD on 7/6/2020. Please advise. Biopsy Discharge    Discharge Instructions  - You have had a biopsy of your kidney.  - You may shower in 24 hours. No soaking or swimming until the site is completely healed.  - Keep the area covered and dry for the next 24 hours.  - Do not perform any heavy lifting for the next few days or until the site is healed.  - You may resume your normal diet.  - You may resume your normal medications however you should wait 48 hours before restarting aspirin, plavix, or blood thinners.  - It is normal to experience some pain over the site for the next few days. You may take apply ice to the area (20 minutes on, 20 minutes off) and take Tylenol for that pain. Do not take more frequently than every 6 hours and do not exceed more than 3000mg of Tylenol in a 24 hour period.    - You were given conscious sedation which may make you drowsy, therefore you need someone to stay with you until the morning following the procedure.  - Do not drive, engage in heavy lifting or strenuous activity, or drink any alcoholic beverages for the next 24 hours.   - You may resume normal activity in 24 hours.    Notify your primary physician and/or Interventional Radiology IMMEDIATELY if you experience any of the following       - Fever of 101F or 38C       - Chills or Rigors/ Shakes       - Swelling and/or Redness in the area around the biopsy site       - Worsening Pain       - Blood soaked bandages or worsening bleeding       - Lightheadedness and/or dizziness upon standing       - Chest Pain/ Tightness       - Shortness of Breath       - Difficulty walking    If you have a problem that you believe requires IMMEDIATE attention, please go to your NEAREST Emergency Room. If you believe your problem can safely wait until you speak to a physician, please call Interventional Radiology for any concerns.    During Normal Weekday Business Hours- You can contact the Interventional Radiology department during normal business hours via telephone.  During Evenings and Weekends- If you need to contact Interventional Radiology during off hours, do so by calling the hospital and requesting to be connected to the Interventional Radiologist on call.

## 2020-09-15 ENCOUNTER — APPOINTMENT (OUTPATIENT)
Dept: VASCULAR SURGERY | Facility: CLINIC | Age: 55
End: 2020-09-15
Payer: MEDICAID

## 2020-09-15 ENCOUNTER — APPOINTMENT (OUTPATIENT)
Dept: NEPHROLOGY | Facility: CLINIC | Age: 55
End: 2020-09-15
Payer: MEDICAID

## 2020-09-15 VITALS
HEIGHT: 71 IN | SYSTOLIC BLOOD PRESSURE: 176 MMHG | HEART RATE: 82 BPM | DIASTOLIC BLOOD PRESSURE: 84 MMHG | BODY MASS INDEX: 36.68 KG/M2 | OXYGEN SATURATION: 97 % | WEIGHT: 262 LBS | TEMPERATURE: 98.5 F

## 2020-09-15 VITALS
DIASTOLIC BLOOD PRESSURE: 86 MMHG | BODY MASS INDEX: 36.68 KG/M2 | HEART RATE: 80 BPM | SYSTOLIC BLOOD PRESSURE: 166 MMHG | HEIGHT: 71 IN | TEMPERATURE: 98.3 F | WEIGHT: 262 LBS

## 2020-09-15 PROCEDURE — 99215 OFFICE O/P EST HI 40 MIN: CPT

## 2020-09-15 PROCEDURE — 29580 STRAPPING UNNA BOOT: CPT | Mod: RT

## 2020-09-15 NOTE — REVIEW OF SYSTEMS
[Heart Rate Is Slow] : the heart rate was not slow [Heart Rate Is Fast] : the heart rate was not fast [Chest Pain] : no chest pain [Lower Ext Edema] : lower extremity edema [Leg Claudication] : no intermittent leg claudication [Palpitations] : no palpitations [Negative] : Heme/Lymph

## 2020-09-15 NOTE — ASSESSMENT
[FreeTextEntry1] : 1) CKD IV\par 2) HTN\par 3) DM\par 4) Edema\par 5) CHF\par \par On torsemide 20mg BID now\par Increase amlodipine to 10mg daily;\par Spoke with Dr. Tran; pt had 24 hour urine checked at [place]; had 10gm proteinuria; is hypoalbuminemic; spot ratio here 6.9 this past Thursday\par Held discussion about eventual dialysis and renal transplant below GFR 20; \par Will f/u in 1 month

## 2020-09-15 NOTE — HISTORY OF PRESENT ILLNESS
[FreeTextEntry1] : Patient accompanied by his sister, Carissa, during appointment today\par PCP: Josiah aWrd\par Cardiology: Tomas Tran\par Endocrinology: Rebecca Cabrera ([phone])\par Neurology: Dakota Chawla\par Social Hx: Born in [place]. Never smoker. Denies alcohol or drug use\par Fam. Hx: Denies family hx of kidney disease.\par PMH: hypertrophic cardiomyopathy, Left ventricular outflow tract obstruction, first-degree heart block, lower extremity edema, DM 2 (dx 2 years ago), HTN, seizure disorder, HLD, GERD\par Echocardiogram June 1, 2020 pressures left ventricle normal size and function with ejection fraction of 60-65%. Moderate concentric LVH noted. Moderate mitral regurgitation is noted which is a bit worse than his prior echo.\par Patient referred to our practice for worsening renal function. He has not seen a Nephrologist before. He feels well today but he has noticed worsening lower extremity swelling over the past two months. He was previously on Lasix but ran out of the medication and stopped taking it for a while. He was directed by Cardiologist to restart Lasix 40 mg BID at the end of June. Cardiologist also ordered lower extremity venous Doppler to rule out DVT - no evidence of DVT. Chest x-ray was done due to diminished breath sounds on the right - small right effusion. Patient states that he does not exercise at all. He denies dizziness, lightheadedness, chest pain, palpitations, syncope, dyspnea today.\par Patient follows with Endo. for DM control . Checks blood sugar levels three times a day, he said sometimes numbers go into 200's. He is unsure what is last hgb A1c level is. Patient said he has a history of hospitalizations due to diabetes.\par Pt was hospitalized at [facility] ; discharged 8/23/20; f/u by me within 24 hours of discharge for appointment today; medications reviewed; currently off valsartan ; lasix 40mg daily changed to torsemide 5mg daily; cardiology increased on phone to 10mg daily; worsening LE edema;\par  Pt seen/examined for close follow up. Saw Dr. Tran on Friday. Medications reviewed; still taking hydralazine. BP acceptable. Legs wrapped; still edematous; reports feeling better. Labs reviewed in detail with patient and sister Carissa. \par \par Current: Pt seen/examined post renal biopsy; showing 40% IFTA; severe arteriosclerosis; KW nodules; consistent with diabetic nephropathy; on torsemide 20mg BID; amlodipine 5mg daily and doxazosin 4mg daily;

## 2020-09-15 NOTE — PHYSICAL EXAM
[General Appearance - Alert] : alert [General Appearance - In No Acute Distress] : in no acute distress [Sclera] : the sclera and conjunctiva were normal [Outer Ear] : the ears and nose were normal in appearance [Neck Appearance] : the appearance of the neck was normal [Neck Cervical Mass (___cm)] : no neck mass was observed [] : no respiratory distress [Respiration, Rhythm And Depth] : normal respiratory rhythm and effort [Exaggerated Use Of Accessory Muscles For Inspiration] : no accessory muscle use [Heart Sounds] : normal S1 and S2 [Apical Impulse] : the apical impulse was normal [Heart Rate And Rhythm] : heart rate was normal and rhythm regular [Murmurs] : no murmurs [Heart Sounds Gallop] : no gallops [FreeTextEntry1] : 2-3+ LE edema, right leg slightly greater than left [Heart Sounds Pericardial Friction Rub] : no pericardial rub [Bowel Sounds] : normal bowel sounds [Abdomen Soft] : soft [Abdomen Mass (___ Cm)] : no abdominal mass palpated [Abdomen Tenderness] : non-tender [No CVA Tenderness] : no ~M costovertebral angle tenderness [Involuntary Movements] : no involuntary movements were seen [Impaired Insight] : insight and judgment were intact [Skin Color & Pigmentation] : normal skin color and pigmentation [Affect] : the affect was normal [Mood] : the mood was normal

## 2020-09-16 PROBLEM — N18.9 CHRONIC KIDNEY DISEASE, UNSPECIFIED: Chronic | Status: ACTIVE | Noted: 2020-09-09

## 2020-09-16 PROBLEM — I44.0 ATRIOVENTRICULAR BLOCK, FIRST DEGREE: Chronic | Status: ACTIVE | Noted: 2020-09-09

## 2020-09-16 PROBLEM — R60.0 LOCALIZED EDEMA: Chronic | Status: ACTIVE | Noted: 2020-09-09

## 2020-09-16 PROBLEM — I10 ESSENTIAL (PRIMARY) HYPERTENSION: Chronic | Status: ACTIVE | Noted: 2020-09-09

## 2020-09-21 ENCOUNTER — APPOINTMENT (OUTPATIENT)
Dept: ULTRASOUND IMAGING | Facility: HOSPITAL | Age: 55
End: 2020-09-21

## 2020-09-22 ENCOUNTER — APPOINTMENT (OUTPATIENT)
Dept: VASCULAR SURGERY | Facility: CLINIC | Age: 55
End: 2020-09-22
Payer: MEDICAID

## 2020-09-22 VITALS
SYSTOLIC BLOOD PRESSURE: 137 MMHG | DIASTOLIC BLOOD PRESSURE: 81 MMHG | HEIGHT: 71 IN | BODY MASS INDEX: 36.68 KG/M2 | WEIGHT: 262 LBS | OXYGEN SATURATION: 97 % | HEART RATE: 87 BPM | TEMPERATURE: 97.9 F

## 2020-09-22 DIAGNOSIS — R60.0 LOCALIZED EDEMA: ICD-10-CM

## 2020-09-22 PROCEDURE — 29580 STRAPPING UNNA BOOT: CPT | Mod: 50

## 2020-09-29 ENCOUNTER — NON-APPOINTMENT (OUTPATIENT)
Age: 55
End: 2020-09-29

## 2020-09-29 ENCOUNTER — APPOINTMENT (OUTPATIENT)
Dept: CARDIOLOGY | Facility: CLINIC | Age: 55
End: 2020-09-29
Payer: MEDICAID

## 2020-09-29 VITALS
HEART RATE: 71 BPM | RESPIRATION RATE: 16 BRPM | WEIGHT: 255 LBS | DIASTOLIC BLOOD PRESSURE: 80 MMHG | BODY MASS INDEX: 35.7 KG/M2 | SYSTOLIC BLOOD PRESSURE: 180 MMHG | TEMPERATURE: 97.6 F | OXYGEN SATURATION: 97 % | HEIGHT: 71 IN

## 2020-09-29 PROCEDURE — 99214 OFFICE O/P EST MOD 30 MIN: CPT

## 2020-09-29 PROCEDURE — 93000 ELECTROCARDIOGRAM COMPLETE: CPT

## 2020-09-29 RX ORDER — HYDRALAZINE HYDROCHLORIDE 25 MG/1
25 TABLET ORAL 3 TIMES DAILY
Qty: 270 | Refills: 1 | Status: DISCONTINUED | COMMUNITY
End: 2020-09-29

## 2020-09-29 NOTE — PHYSICAL EXAM
[General Appearance - In No Acute Distress] : no acute distress [Normal Conjunctiva] : the conjunctiva exhibited no abnormalities [Normal Oral Mucosa] : normal oral mucosa [Normal Oropharynx] : normal oropharynx [Auscultation Breath Sounds / Voice Sounds] : lungs were clear to auscultation bilaterally [Normal Rate] : normal [Rhythm Regular] : regular [Normal S1] : normal S1 [Normal S2] : normal S2 [S4] : an S4 was heard [II] : a grade 2 [Abdomen Soft] : soft [Abdomen Tenderness] : non-tender [Abnormal Walk] : normal gait [Nail Clubbing] : no clubbing of the fingernails [Cyanosis, Localized] : no localized cyanosis [Skin Color & Pigmentation] : normal skin color and pigmentation [Oriented To Time, Place, And Person] : oriented to person, place, and time [Affect] : the affect was normal [S3] : no S3 [FreeTextEntry1] : Legs wrapped bilaterally.

## 2020-09-29 NOTE — DISCUSSION/SUMMARY
[FreeTextEntry1] : 1. He will take metoprolol 25 mg twice a day.\par 2. Continue other current cardiac meds in doses as noted above for hypertrophic cardiomyopathy and hypertension and dyslipidemia. \par 3. Followup with nephrology for further workup of nephrotic syndrome and treatment. I will also defer to nephrology with regard to his antihypertensive regimen.\par 4. Follow up with vascular for his edema. Encourage elevation of his legs.\par 5. Monitor BP at home, keep a log and bring to f/u.\par 6. Continue monitoring of loop recorder and looking for additional pauses. No indication for pacemaker at this time. \par 7. F/u for sleep study as many of his pauses seem to occur while he is sleeping.\par 8. Patient strongly encouraged on reducing salt intake.\par 9. Encourage increased protein intake and protein supplementation.\par 10. Follow up with primary doctor for treatment of diabetes.\par 11. Follow up here in 6 weeks.

## 2020-09-29 NOTE — HISTORY OF PRESENT ILLNESS
[FreeTextEntry1] : Patient presents back today having followed up with renal who is now telling him that he likely will need dialysis in 6-12 months. He followed up with vascular and has wraps on his legs which has helped with his edema. He states he has been checking his blood pressure at home but does not know the numbers and did not bring a list with him here today. There is also some confusion surrounding his medication regimen. Symptomatically otherwise he seems to feel well. He is not having any issues with shortness of breath at all. Patient denies chest pain, palpitations, orthopnea, presyncope, syncope.

## 2020-09-29 NOTE — ASSESSMENT
[FreeTextEntry1] : Echocardiogram June 1, 2020 pressures left ventricle normal size and function with ejection fraction of 60-65%. Moderate concentric LVH noted. Moderate mitral regurgitation is noted which is a bit worse than his prior echo.\par \par EKG: Sinus rhythm with second-degree type I heart block. Nonspecific T-wave changes.\par \par 55-year-old man with a past medical history of hypertrophic cardiomyopathy, hypertension, diabetes who presents to me for followup evaluation.  Patient presents back today feeling reasonably well. His edema is improved with the use of leg wraps by vascular. It does appear that he will end up on dialysis the next 6-12 months. Blood pressure is elevated here today although his regimen appears to be confused. At this point I will defer to nephrology with regards to choices of his antihypertensive medication given his advanced renal disease. However I will have him start taking metoprolol twice a day as his only been taking it once a day in the tartrate form. There is no evidence of acute heart fire at this time.

## 2020-09-30 ENCOUNTER — APPOINTMENT (OUTPATIENT)
Dept: VASCULAR SURGERY | Facility: CLINIC | Age: 55
End: 2020-09-30
Payer: MEDICAID

## 2020-09-30 VITALS
OXYGEN SATURATION: 98 % | HEIGHT: 71 IN | BODY MASS INDEX: 36.12 KG/M2 | HEART RATE: 75 BPM | RESPIRATION RATE: 16 BRPM | DIASTOLIC BLOOD PRESSURE: 90 MMHG | WEIGHT: 258 LBS | SYSTOLIC BLOOD PRESSURE: 161 MMHG | TEMPERATURE: 97.6 F

## 2020-09-30 VITALS — SYSTOLIC BLOOD PRESSURE: 146 MMHG | DIASTOLIC BLOOD PRESSURE: 85 MMHG

## 2020-09-30 PROCEDURE — 29580 STRAPPING UNNA BOOT: CPT | Mod: 50

## 2020-09-30 RX ORDER — AMLODIPINE BESYLATE 5 MG/1
5 TABLET ORAL
Qty: 90 | Refills: 0 | Status: COMPLETED | COMMUNITY
Start: 2020-07-23 | End: 2020-09-30

## 2020-10-01 ENCOUNTER — APPOINTMENT (OUTPATIENT)
Dept: CARDIOLOGY | Facility: CLINIC | Age: 55
End: 2020-10-01
Payer: MEDICAID

## 2020-10-01 PROCEDURE — G2066: CPT

## 2020-10-01 PROCEDURE — 93298 REM INTERROG DEV EVAL SCRMS: CPT

## 2020-10-07 ENCOUNTER — APPOINTMENT (OUTPATIENT)
Dept: VASCULAR SURGERY | Facility: CLINIC | Age: 55
End: 2020-10-07
Payer: MEDICAID

## 2020-10-07 VITALS
TEMPERATURE: 97.9 F | BODY MASS INDEX: 36.4 KG/M2 | HEART RATE: 73 BPM | SYSTOLIC BLOOD PRESSURE: 145 MMHG | HEIGHT: 71 IN | WEIGHT: 260.03 LBS | OXYGEN SATURATION: 98 % | DIASTOLIC BLOOD PRESSURE: 80 MMHG | RESPIRATION RATE: 16 BRPM

## 2020-10-07 PROCEDURE — 99213 OFFICE O/P EST LOW 20 MIN: CPT

## 2020-10-13 ENCOUNTER — APPOINTMENT (OUTPATIENT)
Dept: NEPHROLOGY | Facility: CLINIC | Age: 55
End: 2020-10-13
Payer: MEDICAID

## 2020-10-13 VITALS
SYSTOLIC BLOOD PRESSURE: 172 MMHG | HEART RATE: 68 BPM | TEMPERATURE: 97.6 F | WEIGHT: 261 LBS | HEIGHT: 71 IN | DIASTOLIC BLOOD PRESSURE: 86 MMHG | BODY MASS INDEX: 36.54 KG/M2

## 2020-10-13 PROCEDURE — 36415 COLL VENOUS BLD VENIPUNCTURE: CPT

## 2020-10-13 PROCEDURE — 99215 OFFICE O/P EST HI 40 MIN: CPT | Mod: 25

## 2020-10-13 NOTE — ASSESSMENT
[FreeTextEntry1] : 1) CKD IV\par 2) HTN\par 3) DM\par 4) Edema\par 5) CHF\par \par On torsemide 20mg BID now ; reiterated stopping lasix\par Increase amlodipine to 10mg daily; was taking 5\par Spoke with Dr. Tran; pt had 24 hour urine checked at [place]; had 10gm proteinuria; is hypoalbuminemic; spot ratio here 6.9 this past Thursday\par Held discussion about eventual dialysis and renal transplant below GFR 20; \par Will f/u in 1 month

## 2020-10-13 NOTE — HISTORY OF PRESENT ILLNESS
[FreeTextEntry1] : Patient accompanied by his sister, Carissa, during appointment today\par PCP: Josiah Ward\par Cardiology: Tomas Tran\par Endocrinology: Rebecca Cabrera ([phone])\par Neurology: Dakota Chawla\par Social Hx: Born in [place]. Never smoker. Denies alcohol or drug use\par Fam. Hx: Denies family hx of kidney disease.\par PMH: hypertrophic cardiomyopathy, Left ventricular outflow tract obstruction, first-degree heart block, lower extremity edema, DM 2 (dx 2 years ago), HTN, seizure disorder, HLD, GERD\par Echocardiogram June 1, 2020 pressures left ventricle normal size and function with ejection fraction of 60-65%. Moderate concentric LVH noted. Moderate mitral regurgitation is noted which is a bit worse than his prior echo.\par Patient referred to our practice for worsening renal function. He has not seen a Nephrologist before. He feels well today but he has noticed worsening lower extremity swelling over the past two months. He was previously on Lasix but ran out of the medication and stopped taking it for a while. He was directed by Cardiologist to restart Lasix 40 mg BID at the end of June. Cardiologist also ordered lower extremity venous Doppler to rule out DVT - no evidence of DVT. Chest x-ray was done due to diminished breath sounds on the right - small right effusion. Patient states that he does not exercise at all. He denies dizziness, lightheadedness, chest pain, palpitations, syncope, dyspnea today.\par Patient follows with Endo. for DM control . Checks blood sugar levels three times a day, he said sometimes numbers go into 200's. He is unsure what is last hgb A1c level is. Patient said he has a history of hospitalizations due to diabetes.\par Pt was hospitalized at [facility] [place] ; [place]/23/20; f/u by me within 24 hours of discharge for appointment today; medications reviewed; currently off valsartan ; lasix 40mg daily changed to torsemide 5mg daily; cardiology increased on phone to 10mg daily; worsening LE edema;\par  Pt seen/examined for close follow up. Saw Dr. Tran on Friday. Medications reviewed; still taking hydralazine. BP acceptable. Legs wrapped; still edematous; reports feeling better. Labs reviewed in detail with patient and sister Carissa. \par  Pt seen/examined post renal biopsy; showing 40% IFTA; severe arteriosclerosis; KW nodules; consistent with diabetic nephropathy; on torsemide 20mg BID; amlodipine 5mg daily and doxazosin 4mg daily;\par \par Current: Pt seen/examined; medications reconciled at length; taking both torsemide BID and furosemide; stopped furosemide; amlodipine stlil at 5mg; should be 10

## 2020-10-15 LAB
ALBUMIN SERPL ELPH-MCNC: 3.3 G/DL
ANION GAP SERPL CALC-SCNC: 11 MMOL/L
APPEARANCE: CLEAR
BACTERIA: NEGATIVE
BASOPHILS # BLD AUTO: 0.04 K/UL
BASOPHILS NFR BLD AUTO: 0.6 %
BILIRUBIN URINE: NEGATIVE
BLOOD URINE: NORMAL
BUN SERPL-MCNC: 44 MG/DL
CALCIUM SERPL-MCNC: 8 MG/DL
CHLORIDE SERPL-SCNC: 103 MMOL/L
CO2 SERPL-SCNC: 26 MMOL/L
COLOR: NORMAL
CREAT SERPL-MCNC: 2.87 MG/DL
CREAT SPEC-SCNC: 64 MG/DL
CREAT/PROT UR: 7.4 RATIO
EOSINOPHIL # BLD AUTO: 0.19 K/UL
EOSINOPHIL NFR BLD AUTO: 2.9 %
ESTIMATED AVERAGE GLUCOSE: 148 MG/DL
GLUCOSE QUALITATIVE U: ABNORMAL
GLUCOSE SERPL-MCNC: 227 MG/DL
HBA1C MFR BLD HPLC: 6.8 %
HCT VFR BLD CALC: 29.4 %
HGB BLD-MCNC: 9.6 G/DL
HYALINE CASTS: 4 /LPF
IMM GRANULOCYTES NFR BLD AUTO: 0.5 %
KETONES URINE: NEGATIVE
LEUKOCYTE ESTERASE URINE: NEGATIVE
LYMPHOCYTES # BLD AUTO: 2.11 K/UL
LYMPHOCYTES NFR BLD AUTO: 32.4 %
MAN DIFF?: NORMAL
MCHC RBC-ENTMCNC: 28.7 PG
MCHC RBC-ENTMCNC: 32.7 GM/DL
MCV RBC AUTO: 88 FL
MICROSCOPIC-UA: NORMAL
MONOCYTES # BLD AUTO: 0.37 K/UL
MONOCYTES NFR BLD AUTO: 5.7 %
NEUTROPHILS # BLD AUTO: 3.78 K/UL
NEUTROPHILS NFR BLD AUTO: 57.9 %
NITRITE URINE: NEGATIVE
PH URINE: 6.5
PHOSPHATE SERPL-MCNC: 4.3 MG/DL
PLATELET # BLD AUTO: 199 K/UL
POTASSIUM SERPL-SCNC: 5.2 MMOL/L
PROT UR-MCNC: 474 MG/DL
PROTEIN URINE: ABNORMAL
RBC # BLD: 3.34 M/UL
RBC # FLD: 13.3 %
RED BLOOD CELLS URINE: 1 /HPF
SODIUM SERPL-SCNC: 140 MMOL/L
SPECIFIC GRAVITY URINE: 1.01
SQUAMOUS EPITHELIAL CELLS: 1 /HPF
UROBILINOGEN URINE: NORMAL
WBC # FLD AUTO: 6.52 K/UL
WHITE BLOOD CELLS URINE: 1 /HPF

## 2020-10-20 LAB — CELLS.CD3-CD19+/CELLS IN BLOOD: 8 %

## 2020-11-06 ENCOUNTER — APPOINTMENT (OUTPATIENT)
Dept: CARDIOLOGY | Facility: CLINIC | Age: 55
End: 2020-11-06
Payer: MEDICAID

## 2020-11-06 PROCEDURE — 93298 REM INTERROG DEV EVAL SCRMS: CPT

## 2020-11-06 PROCEDURE — G2066: CPT

## 2020-11-10 ENCOUNTER — APPOINTMENT (OUTPATIENT)
Dept: NEPHROLOGY | Facility: CLINIC | Age: 55
End: 2020-11-10
Payer: MEDICAID

## 2020-11-10 ENCOUNTER — NON-APPOINTMENT (OUTPATIENT)
Age: 55
End: 2020-11-10

## 2020-11-10 ENCOUNTER — APPOINTMENT (OUTPATIENT)
Dept: CARDIOLOGY | Facility: CLINIC | Age: 55
End: 2020-11-10
Payer: MEDICAID

## 2020-11-10 VITALS
TEMPERATURE: 98.3 F | SYSTOLIC BLOOD PRESSURE: 131 MMHG | BODY MASS INDEX: 37.24 KG/M2 | WEIGHT: 266 LBS | DIASTOLIC BLOOD PRESSURE: 75 MMHG | RESPIRATION RATE: 16 BRPM | HEIGHT: 71 IN | HEART RATE: 42 BPM

## 2020-11-10 VITALS
HEIGHT: 71 IN | DIASTOLIC BLOOD PRESSURE: 78 MMHG | HEART RATE: 70 BPM | WEIGHT: 266 LBS | SYSTOLIC BLOOD PRESSURE: 138 MMHG | TEMPERATURE: 98.2 F | BODY MASS INDEX: 37.24 KG/M2

## 2020-11-10 DIAGNOSIS — G25.2 OTHER SPECIFIED FORMS OF TREMOR: ICD-10-CM

## 2020-11-10 PROCEDURE — 99215 OFFICE O/P EST HI 40 MIN: CPT | Mod: 25

## 2020-11-10 PROCEDURE — 99072 ADDL SUPL MATRL&STAF TM PHE: CPT

## 2020-11-10 PROCEDURE — 93000 ELECTROCARDIOGRAM COMPLETE: CPT

## 2020-11-10 PROCEDURE — 99214 OFFICE O/P EST MOD 30 MIN: CPT

## 2020-11-10 PROCEDURE — 36415 COLL VENOUS BLD VENIPUNCTURE: CPT

## 2020-11-10 NOTE — ASSESSMENT
[FreeTextEntry1] : 1) CKD IV\par 2) HTN\par 3) DM\par 4) Edema\par 5) CHF\par \par On torsemide 20mg BID now ;\par cw amlodipine to 10mg daily; \par Spoke with Dr. Tran; pt had 24 hour urine checked at [place]; had 10gm proteinuria; is hypoalbuminemic; spot ratio here 6.9 this past Thursday\par Held discussion about eventual dialysis and renal transplant below GFR 20; \par Referred to vascular for AVF; message sent to Dr Albina Huff\par Healthy transitions program; spoke with Chloe Ott\par \par Will f/u in 1 month

## 2020-11-10 NOTE — HISTORY OF PRESENT ILLNESS
[FreeTextEntry1] : Patient accompanied by his sister, Carissa, during appointment today\par PCP: Josiah Ward\par Cardiology: Tomas Tran\par Endocrinology: Rebecca Cabrera ([phone])\par Neurology: Dakota Chawla\par Social Hx: Born in [place]. Never smoker. Denies alcohol or drug use\par Fam. Hx: Denies family hx of kidney disease.\par PMH: hypertrophic cardiomyopathy, Left ventricular outflow tract obstruction, first-degree heart block, lower extremity edema, DM 2 (dx 2 years ago), HTN, seizure disorder, HLD, GERD\par Echocardiogram June 1, 2020 pressures left ventricle normal size and function with ejection fraction of 60-65%. Moderate concentric LVH noted. Moderate mitral regurgitation is noted which is a bit worse than his prior echo.\par Patient referred to our practice for worsening renal function. He has not seen a Nephrologist before. He feels well today but he has noticed worsening lower extremity swelling over the past two months. He was previously on Lasix but ran out of the medication and stopped taking it for a while. He was directed by Cardiologist to restart Lasix 40 mg BID at the end of June. Cardiologist also ordered lower extremity venous Doppler to rule out DVT - no evidence of DVT. Chest x-ray was done due to diminished breath sounds on the right - small right effusion. Patient states that he does not exercise at all. He denies dizziness, lightheadedness, chest pain, palpitations, syncope, dyspnea today.\par Patient follows with Endo. for DM control . Checks blood sugar levels three times a day, he said sometimes numbers go into 200's. He is unsure what is last hgb A1c level is. Patient said he has a history of hospitalizations due to diabetes.\par Pt was hospitalized at [facility] [place] ; discharged 8/23/20; f/u by me within 24 hours of discharge for appointment today; medications reviewed; currently off valsartan ; lasix 40mg daily changed to torsemide 5mg daily; cardiology increased on phone to 10mg daily; worsening LE edema;\par  Pt seen/examined for close follow up. Saw Dr. Tran on Friday. Medications reviewed; still taking hydralazine. BP acceptable. Legs wrapped; still edematous; reports feeling better. Labs reviewed in detail with patient and sister Carissa. \par  Pt seen/examined post renal biopsy; showing 40% IFTA; severe arteriosclerosis; KW nodules; consistent with diabetic nephropathy; on torsemide 20mg BID; amlodipine 5mg daily and doxazosin 4mg daily;\par Pt seen/examined; medications reconciled at length; taking both torsemide BID and furosemide; stopped furosemide; amlodipine stlil at 5mg; should be 10\par \par Current: pt seen/examined ; Cr worsening;

## 2020-11-12 ENCOUNTER — APPOINTMENT (OUTPATIENT)
Dept: ELECTROPHYSIOLOGY | Facility: CLINIC | Age: 55
End: 2020-11-12

## 2020-11-12 LAB
ALBUMIN SERPL ELPH-MCNC: 3.7 G/DL
ANION GAP SERPL CALC-SCNC: 16 MMOL/L
BUN SERPL-MCNC: 47 MG/DL
CALCIUM SERPL-MCNC: 8.5 MG/DL
CHLORIDE SERPL-SCNC: 107 MMOL/L
CO2 SERPL-SCNC: 19 MMOL/L
CREAT SERPL-MCNC: 3.42 MG/DL
CREAT SPEC-SCNC: 196 MG/DL
CREAT/PROT UR: 5.9 RATIO
FERRITIN SERPL-MCNC: 250 NG/ML
GLUCOSE SERPL-MCNC: 111 MG/DL
IRON SATN MFR SERPL: 21 %
IRON SERPL-MCNC: 50 UG/DL
PHOSPHATE SERPL-MCNC: 4.3 MG/DL
POTASSIUM SERPL-SCNC: 6.9 MMOL/L
PROT UR-MCNC: 1149 MG/DL
SODIUM SERPL-SCNC: 142 MMOL/L
TIBC SERPL-MCNC: 242 UG/DL
TRANSFERRIN SERPL-MCNC: 193 MG/DL
UIBC SERPL-MCNC: 192 UG/DL

## 2020-11-18 ENCOUNTER — APPOINTMENT (OUTPATIENT)
Dept: VASCULAR SURGERY | Facility: CLINIC | Age: 55
End: 2020-11-18

## 2020-11-25 ENCOUNTER — APPOINTMENT (OUTPATIENT)
Dept: VASCULAR SURGERY | Facility: CLINIC | Age: 55
End: 2020-11-25
Payer: MEDICAID

## 2020-11-25 VITALS
SYSTOLIC BLOOD PRESSURE: 101 MMHG | DIASTOLIC BLOOD PRESSURE: 64 MMHG | OXYGEN SATURATION: 96 % | TEMPERATURE: 98.4 F | HEART RATE: 79 BPM | WEIGHT: 266 LBS | HEIGHT: 71 IN | BODY MASS INDEX: 37.24 KG/M2

## 2020-11-25 DIAGNOSIS — Z99.2 DEPENDENCE ON RENAL DIALYSIS: ICD-10-CM

## 2020-11-25 DIAGNOSIS — L81.9 DISORDER OF PIGMENTATION, UNSPECIFIED: ICD-10-CM

## 2020-11-25 DIAGNOSIS — I89.0 LYMPHEDEMA, NOT ELSEWHERE CLASSIFIED: ICD-10-CM

## 2020-11-25 DIAGNOSIS — Z78.9 OTHER SPECIFIED HEALTH STATUS: ICD-10-CM

## 2020-11-25 PROCEDURE — 99214 OFFICE O/P EST MOD 30 MIN: CPT

## 2020-11-25 PROCEDURE — 93986 DUP-SCAN HEMO COMPL UNI STD: CPT

## 2020-11-27 DIAGNOSIS — E83.39 OTHER DISORDERS OF PHOSPHORUS METABOLISM: ICD-10-CM

## 2020-11-27 DIAGNOSIS — E55.9 VITAMIN D DEFICIENCY, UNSPECIFIED: ICD-10-CM

## 2020-11-30 ENCOUNTER — APPOINTMENT (OUTPATIENT)
Dept: VASCULAR SURGERY | Facility: CLINIC | Age: 55
End: 2020-11-30

## 2020-12-01 ENCOUNTER — APPOINTMENT (OUTPATIENT)
Dept: NEPHROLOGY | Facility: CLINIC | Age: 55
End: 2020-12-01
Payer: MEDICAID

## 2020-12-01 VITALS
BODY MASS INDEX: 37.1 KG/M2 | SYSTOLIC BLOOD PRESSURE: 114 MMHG | DIASTOLIC BLOOD PRESSURE: 70 MMHG | HEART RATE: 74 BPM | WEIGHT: 265 LBS | TEMPERATURE: 98.3 F | HEIGHT: 71 IN

## 2020-12-01 PROCEDURE — 99215 OFFICE O/P EST HI 40 MIN: CPT

## 2020-12-01 PROCEDURE — 99072 ADDL SUPL MATRL&STAF TM PHE: CPT

## 2020-12-01 RX ORDER — METOPROLOL TARTRATE 25 MG/1
25 TABLET, FILM COATED ORAL TWICE DAILY
Qty: 180 | Refills: 1 | Status: DISCONTINUED | COMMUNITY
End: 2020-12-01

## 2020-12-01 RX ORDER — ATORVASTATIN CALCIUM 40 MG/1
40 TABLET, FILM COATED ORAL
Qty: 90 | Refills: 1 | Status: DISCONTINUED | COMMUNITY
End: 2020-12-01

## 2020-12-01 RX ORDER — CALCIUM ACETATE 667 MG/1
667 CAPSULE ORAL
Qty: 60 | Refills: 1 | Status: DISCONTINUED | COMMUNITY
Start: 2020-11-27 | End: 2020-12-01

## 2020-12-01 RX ORDER — MUPIROCIN 20 MG/G
2 OINTMENT TOPICAL
Qty: 22 | Refills: 0 | Status: DISCONTINUED | COMMUNITY
Start: 2020-09-17 | End: 2020-12-01

## 2020-12-01 RX ORDER — INSULIN DEGLUDEC INJECTION 100 U/ML
100 INJECTION, SOLUTION SUBCUTANEOUS
Refills: 0 | Status: DISCONTINUED | COMMUNITY
Start: 2018-10-10 | End: 2020-12-01

## 2020-12-01 RX ORDER — CHOLECALCIFEROL (VITAMIN D3) 1250 MCG
1.25 MG CAPSULE ORAL
Qty: 12 | Refills: 3 | Status: DISCONTINUED | COMMUNITY
Start: 2020-11-27 | End: 2020-12-01

## 2020-12-01 RX ORDER — TORSEMIDE 20 MG/1
20 TABLET ORAL
Qty: 180 | Refills: 2 | Status: DISCONTINUED | COMMUNITY
Start: 2020-08-27 | End: 2020-12-01

## 2020-12-01 RX ORDER — INSULIN GLARGINE 100 [IU]/ML
100 INJECTION, SOLUTION SUBCUTANEOUS
Qty: 1 | Refills: 0 | Status: DISCONTINUED | COMMUNITY
Start: 2018-10-10 | End: 2020-12-01

## 2020-12-01 NOTE — QUALITY MEASURES
[PTH level measured within last] : patient's PTH level measured within the last 12 months [Yes] : patient is an appropriate candidate for kidney transplantation evaluation [Patient has started or completed the] : patient has started or completed the process of evaluation for renal transplant

## 2020-12-01 NOTE — ADDENDUM
[FreeTextEntry1] : Given the patient's stable cardiac pattern as noted above, there is no cardiac contraindication to AV fistula placement.  Recommend monitoring of the patient through the procedure until stable post operatively.  Continue current cardiac medications.  No additional CV testing needed prior to surgery.

## 2020-12-01 NOTE — DISCUSSION/SUMMARY
[FreeTextEntry1] : 1. Continue current cardiac meds in doses as noted above for hypertrophic cardiomyopathy and hypertension and dyslipidemia. \par 2. Followup with nephrology. I will also defer to nephrology with regard to his antihypertensive regimen.\par 3. Follow up with vascular for his edema. Encourage him to get above-the-knee stockings. Encourage elevation of his legs.\par 4. Monitor BP at home, keep a log and bring to f/u.\par 5. Continue monitoring of loop recorder and looking for additional pauses. No indication for pacemaker at this time. Followup with EP.\par 6. F/u for sleep study as many of his pauses seem to occur while he is sleeping.\par 7. Patient strongly encouraged on reducing salt intake.\par 8.  Follow up with primary doctor for treatment of diabetes.\par 9. Follow up here in 3 months.

## 2020-12-01 NOTE — HISTORY OF PRESENT ILLNESS
[FreeTextEntry1] : Patient presents back today with complaints of a recent intention tremor over the last 3-4 weeks. He does not really have any other symptoms. His edema seems to be under reasonable control with the use of stockings at this time. He is no longer using leg wraps. He does not have a list of his blood pressures with him here today but his medications were adjusted by his nephrologist. He remains fairly active but has not had any real issues physically when he exerts himself. Patient denies chest pain, shortness of breath, palpitations, orthopnea, presyncope, syncope.

## 2020-12-01 NOTE — REVIEW OF SYSTEMS
Warm [FreeTextEntry1] : Patient denies headache, other than noted above full review of systems is unremarkable.

## 2020-12-01 NOTE — ASSESSMENT
[FreeTextEntry1] : Echocardiogram June 1, 2020 pressures left ventricle normal size and function with ejection fraction of 60-65%. Moderate concentric LVH noted. Moderate mitral regurgitation is noted which is a bit worse than his prior echo.\par \par EKG: Sinus rhythm with second-degree type I heart block.\par \par 55-year-old man with a past medical history of hypertrophic cardiomyopathy, hypertension, diabetes who presents to me for followup evaluation.  Patient presents back today feeling reasonably well. His edema is improved with the use of stockings. Review of his loop recorder continues to show pauses and EKG today shows intermittent second-degree heart block. I asked him to followup with EP although I still do not believe he needs a pacemaker yet. Blood pressure has been somewhat labile but he did not bring a list with him here today. I will continue to defer to his nephrologist in this regard. There is no other evidence of overt acute heart failure. I believe he would do better with above-the-knee stockings but his edema is mostly controlled. His new tremor which appears to be an intention tremor should be evaluated by neurology but I also refer him back to renal although I believe it unlikely that this is related to asterixis.

## 2020-12-01 NOTE — ASSESSMENT
[FreeTextEntry1] : 1) ESRD\par 2) HTN\par 3) DM\par 4) Edema\par 5) CHF\par \par Started on dialysis TIW\par Did not meet clearances; at 4:30 on HD\par Off ARB due to GFR < 30 ; now on HD can restart\par Edema improved;\par \par Will f/u in 2 month

## 2020-12-03 PROBLEM — I89.0 ACQUIRED LYMPHEDEMA: Status: ACTIVE | Noted: 2020-09-30

## 2020-12-03 NOTE — HISTORY OF PRESENT ILLNESS
[FreeTextEntry1] : 54 y/o Kittitian speaking male with history of hypertrophic cardiomyopathy, hypertension, CHF, HLD, CKD IV, diabetes who presents to me for followup evaluation. He was seen by Nephrology, Dr Lopes, yesterday and Torsemide was increased to 20 mg QD. U/S RLE 7/9/20 ([facility]) was negative for DVT/SVT. He is sedentary. He states symptoms worsened 2 months ago. No weeping of legs, no ulcers. He refers no pain. No fever or chills. He is a non-smoker. Edema has worsened significantly, comes for evaluation.\par \par

## 2020-12-03 NOTE — ASSESSMENT
[FreeTextEntry1] : 54 y/o male with history of hypertrophic cardiomyopathy, hypertension, CHF, HLD, CKD IV, diabetes and lymphedema. I applied RLE Unna Boots, he will utilize conservative treatment and I will contact [facility] to obtain Lymphedema pumps.\par \par Plan:\par Continue taking ASA 81 mg, \par I applied bilateral Unna Boots.\par Conservative treatment: compression, frequent ambulation and elevation of legs above the level of the heart.\par Would benefit from lymphedema pumps.\par RTC 1 week. [Arterial/Venous Disease] : arterial/venous disease

## 2020-12-03 NOTE — PHYSICAL EXAM
[Ankle Swelling (On Exam)] : present [Ankle Swelling Bilaterally] : severe [Alert] : alert [Oriented to Person] : oriented to person [Oriented to Place] : oriented to place [Oriented to Time] : oriented to time [Calm] : calm [de-identified] : WD, WN, NAD. Awake, alert, interactive. Ambulates without difficulty [de-identified] : MIKE, PERDEANDREL [de-identified] : supple [de-identified] : non-labored [FreeTextEntry1] : BLE 1+ pitting edema no ulcer or weeping, from thighs through toes, with mild hyperpigmentation distally and skin changes characteristic of phlebolymphedema..\par No erythema or s/s of infection. [de-identified] : no cyanosis or deformity. full ROM, MS 5/5\par  [de-identified] : SAMMY

## 2020-12-08 ENCOUNTER — NON-APPOINTMENT (OUTPATIENT)
Age: 55
End: 2020-12-08

## 2020-12-08 ENCOUNTER — APPOINTMENT (OUTPATIENT)
Dept: ELECTROPHYSIOLOGY | Facility: CLINIC | Age: 55
End: 2020-12-08
Payer: MEDICAID

## 2020-12-08 ENCOUNTER — RESULT REVIEW (OUTPATIENT)
Age: 55
End: 2020-12-08

## 2020-12-08 ENCOUNTER — APPOINTMENT (OUTPATIENT)
Dept: NEPHROLOGY | Facility: CLINIC | Age: 55
End: 2020-12-08

## 2020-12-08 ENCOUNTER — OUTPATIENT (OUTPATIENT)
Dept: OUTPATIENT SERVICES | Facility: HOSPITAL | Age: 55
LOS: 1 days | End: 2020-12-08
Payer: COMMERCIAL

## 2020-12-08 VITALS
BODY MASS INDEX: 34.58 KG/M2 | SYSTOLIC BLOOD PRESSURE: 138 MMHG | OXYGEN SATURATION: 97 % | DIASTOLIC BLOOD PRESSURE: 80 MMHG | WEIGHT: 247 LBS | TEMPERATURE: 98.5 F | HEIGHT: 71 IN | HEART RATE: 84 BPM

## 2020-12-08 VITALS
HEIGHT: 71 IN | RESPIRATION RATE: 20 BRPM | HEART RATE: 70 BPM | WEIGHT: 245.15 LBS | TEMPERATURE: 97 F | DIASTOLIC BLOOD PRESSURE: 70 MMHG | SYSTOLIC BLOOD PRESSURE: 140 MMHG

## 2020-12-08 VITALS — SYSTOLIC BLOOD PRESSURE: 136 MMHG | DIASTOLIC BLOOD PRESSURE: 80 MMHG

## 2020-12-08 DIAGNOSIS — Z01.818 ENCOUNTER FOR OTHER PREPROCEDURAL EXAMINATION: ICD-10-CM

## 2020-12-08 DIAGNOSIS — Z98.890 OTHER SPECIFIED POSTPROCEDURAL STATES: Chronic | ICD-10-CM

## 2020-12-08 DIAGNOSIS — I10 ESSENTIAL (PRIMARY) HYPERTENSION: ICD-10-CM

## 2020-12-08 DIAGNOSIS — Z92.89 PERSONAL HISTORY OF OTHER MEDICAL TREATMENT: Chronic | ICD-10-CM

## 2020-12-08 DIAGNOSIS — N18.6 END STAGE RENAL DISEASE: ICD-10-CM

## 2020-12-08 DIAGNOSIS — Z29.9 ENCOUNTER FOR PROPHYLACTIC MEASURES, UNSPECIFIED: ICD-10-CM

## 2020-12-08 LAB
A1C WITH ESTIMATED AVERAGE GLUCOSE RESULT: 6.8 % — HIGH (ref 4–5.6)
ALBUMIN SERPL ELPH-MCNC: 3.3 G/DL — SIGNIFICANT CHANGE UP (ref 3.3–5.2)
ALP SERPL-CCNC: 84 U/L — SIGNIFICANT CHANGE UP (ref 40–120)
ALT FLD-CCNC: 9 U/L — SIGNIFICANT CHANGE UP
ANION GAP SERPL CALC-SCNC: 10 MMOL/L — SIGNIFICANT CHANGE UP (ref 5–17)
APTT BLD: 29.7 SEC — SIGNIFICANT CHANGE UP (ref 27.5–35.5)
AST SERPL-CCNC: 11 U/L — SIGNIFICANT CHANGE UP
BASOPHILS # BLD AUTO: 0.06 K/UL — SIGNIFICANT CHANGE UP (ref 0–0.2)
BASOPHILS NFR BLD AUTO: 0.9 % — SIGNIFICANT CHANGE UP (ref 0–2)
BILIRUB SERPL-MCNC: 0.5 MG/DL — SIGNIFICANT CHANGE UP (ref 0.4–2)
BLD GP AB SCN SERPL QL: SIGNIFICANT CHANGE UP
BUN SERPL-MCNC: 28 MG/DL — HIGH (ref 8–20)
CALCIUM SERPL-MCNC: 8.2 MG/DL — LOW (ref 8.6–10.2)
CHLORIDE SERPL-SCNC: 103 MMOL/L — SIGNIFICANT CHANGE UP (ref 98–107)
CO2 SERPL-SCNC: 28 MMOL/L — SIGNIFICANT CHANGE UP (ref 22–29)
CREAT SERPL-MCNC: 3.12 MG/DL — HIGH (ref 0.5–1.3)
EOSINOPHIL # BLD AUTO: 0.16 K/UL — SIGNIFICANT CHANGE UP (ref 0–0.5)
EOSINOPHIL NFR BLD AUTO: 2.4 % — SIGNIFICANT CHANGE UP (ref 0–6)
ESTIMATED AVERAGE GLUCOSE: 148 MG/DL — HIGH (ref 68–114)
GLUCOSE SERPL-MCNC: 208 MG/DL — HIGH (ref 70–99)
HCT VFR BLD CALC: 31.3 % — LOW (ref 39–50)
HGB BLD-MCNC: 10.5 G/DL — LOW (ref 13–17)
IMM GRANULOCYTES NFR BLD AUTO: 0.6 % — SIGNIFICANT CHANGE UP (ref 0–1.5)
INR BLD: 1.01 RATIO — SIGNIFICANT CHANGE UP (ref 0.88–1.16)
LYMPHOCYTES # BLD AUTO: 2.14 K/UL — SIGNIFICANT CHANGE UP (ref 1–3.3)
LYMPHOCYTES # BLD AUTO: 32.7 % — SIGNIFICANT CHANGE UP (ref 13–44)
MCHC RBC-ENTMCNC: 29.1 PG — SIGNIFICANT CHANGE UP (ref 27–34)
MCHC RBC-ENTMCNC: 33.5 GM/DL — SIGNIFICANT CHANGE UP (ref 32–36)
MCV RBC AUTO: 86.7 FL — SIGNIFICANT CHANGE UP (ref 80–100)
MONOCYTES # BLD AUTO: 0.42 K/UL — SIGNIFICANT CHANGE UP (ref 0–0.9)
MONOCYTES NFR BLD AUTO: 6.4 % — SIGNIFICANT CHANGE UP (ref 2–14)
NEUTROPHILS # BLD AUTO: 3.72 K/UL — SIGNIFICANT CHANGE UP (ref 1.8–7.4)
NEUTROPHILS NFR BLD AUTO: 57 % — SIGNIFICANT CHANGE UP (ref 43–77)
PLATELET # BLD AUTO: 218 K/UL — SIGNIFICANT CHANGE UP (ref 150–400)
POTASSIUM SERPL-MCNC: 4.3 MMOL/L — SIGNIFICANT CHANGE UP (ref 3.5–5.3)
POTASSIUM SERPL-SCNC: 4.3 MMOL/L — SIGNIFICANT CHANGE UP (ref 3.5–5.3)
PROT SERPL-MCNC: 6.4 G/DL — LOW (ref 6.6–8.7)
PROTHROM AB SERPL-ACNC: 11.7 SEC — SIGNIFICANT CHANGE UP (ref 10.6–13.6)
RBC # BLD: 3.61 M/UL — LOW (ref 4.2–5.8)
RBC # FLD: 12.5 % — SIGNIFICANT CHANGE UP (ref 10.3–14.5)
SODIUM SERPL-SCNC: 141 MMOL/L — SIGNIFICANT CHANGE UP (ref 135–145)
WBC # BLD: 6.54 K/UL — SIGNIFICANT CHANGE UP (ref 3.8–10.5)
WBC # FLD AUTO: 6.54 K/UL — SIGNIFICANT CHANGE UP (ref 3.8–10.5)

## 2020-12-08 PROCEDURE — 99072 ADDL SUPL MATRL&STAF TM PHE: CPT

## 2020-12-08 PROCEDURE — 99205 OFFICE O/P NEW HI 60 MIN: CPT

## 2020-12-08 PROCEDURE — 71046 X-RAY EXAM CHEST 2 VIEWS: CPT | Mod: 26

## 2020-12-08 PROCEDURE — 93000 ELECTROCARDIOGRAM COMPLETE: CPT | Mod: 59

## 2020-12-08 PROCEDURE — 71046 X-RAY EXAM CHEST 2 VIEWS: CPT

## 2020-12-08 PROCEDURE — G0463: CPT

## 2020-12-08 RX ORDER — PEN NEEDLE, DIABETIC 29 G X1/2"
31G X 8 MM NEEDLE, DISPOSABLE MISCELLANEOUS
Qty: 100 | Refills: 0 | Status: ACTIVE | COMMUNITY
Start: 2020-06-29

## 2020-12-08 RX ORDER — BLOOD SUGAR DIAGNOSTIC
STRIP MISCELLANEOUS
Qty: 100 | Refills: 0 | Status: ACTIVE | COMMUNITY
Start: 2020-07-15

## 2020-12-08 RX ORDER — HYDRALAZINE HCL 50 MG
1 TABLET ORAL
Qty: 0 | Refills: 0 | DISCHARGE

## 2020-12-08 RX ORDER — DEXTROMETHORPHAN POLISTIREX 30 MG/5 ML
0 SUSPENSION, EXTENDED RELEASE 12 HR ORAL
Qty: 0 | Refills: 0 | DISCHARGE

## 2020-12-08 NOTE — H&P PST ADULT - NSICDXPROBLEM_GEN_ALL_CORE_FT
PROBLEM DIAGNOSES  Problem: End stage renal disease  Assessment and Plan: preop assessment, medical, cardiac, nephrology clearances pending, Left upper extremity fistula creation, possible graft 12/17    Problem: Need for prophylactic measure  Assessment and Plan: caprini score 5, moderate risk for dvt SCD ordered, surgical team to assess for dvt prophylaxis        PROBLEM DIAGNOSES  Problem: HTN (hypertension)  Assessment and Plan: medical and cardiac clearances pending, take AM blood pressure medications on DOP    Problem: End stage renal disease  Assessment and Plan: preop assessment, medical, cardiac, nephrology clearances pending, Left upper extremity fistula creation, possible graft 12/17    Problem: Need for prophylactic measure  Assessment and Plan: caprini score 5, moderate risk for dvt SCD ordered, surgical team to assess for dvt prophylaxis

## 2020-12-08 NOTE — H&P PST ADULT - NSANTHOSAYNRD_GEN_A_CORE
Oct 2016 No. YOSVANY screening performed.  STOP BANG Legend: 0-2 = LOW Risk; 3-4 = INTERMEDIATE Risk; 5-8 = HIGH Risk

## 2020-12-08 NOTE — H&P PST ADULT - HISTORY OF PRESENT ILLNESS
accompanied by his sister, Carissa, during appointment today  PCP: Josiah Ward  Cardiology: Tomas Tran  Endocrinology: Rebecca Cabrera ([phone])    54 yo M PMH of hypertrophic cardiomyopathy, Left ventricular outflow tract obstruction, first-degree heart block, lower extremity edema, DM 2 (dx 2 years ago), HTN, seizure disorder, HLD, GERD, Echocardiogram June 1, 2020 pressures left ventricle normal size and function with ejection fraction of 60-65%. Moderate concentric LVH noted. Moderate mitral regurgitation is noted which is a bit worse than his prior echo. C/o worsening renal function. Nephrologist Dr Chawla. He feels well today but he has noticed worsening lower extremity swelling over the past two months. He was previously on Lasix but ran out of the medication and stopped taking it for a while.  Lower extremity venous Doppler to rule out DVT - no evidence of DVT. Chest x-ray was done due to diminished breath sounds on the right - small right effusion. Patient states that he does not exercise at all. He denies dizziness, lightheadedness, chest pain, palpitations, syncope, dyspnea today. for DM control . Checks blood sugar levels three times a day, he said sometimes numbers go into 200's. He is unsure what is last hgb A1c level is. Patient said he has a history of hospitalizations due to diabetes. Pt was hospitalized at [facility] [place], [place] ; discharged 8/23/20; worsening LE edema; Legs wrapped; still edematous; reports feeling better. Renal biopsy showing 40% IFTA; severe arteriosclerosis; KW nodules; consistent with diabetic nephropathy; On metoprolol  25 mg BID; amlodipine 5 mg daily and doxazosin 4 mg daily. Cr worsening; Pt recently hospitalized at [facility]; started on HD; tunneled catheter, HD 3x/week. Denies FHx of kidney disease. Presents for preop assessment prior to Left Upper Extrenity Fistula creastion, possible graft w/Dr Celina Narvaez on 12/17   54 yo M PMH of hypertrophic cardiomyopathy, Left ventricular outflow tract obstruction, first-degree heart block, lower extremity edema, DM 2 (dx 2 years ago), HTN, seizure disorder, HLD, GERD, Echocardiogram June 1, 2020 pressures left ventricle normal size and function with ejection fraction of 60-65%. Moderate concentric LVH noted. Moderate mitral regurgitation is noted which is a bit worse than his prior echo. C/o worsening renal function. Nephrologist Dr Chawla. He feels well today but he has noticed worsening lower extremity swelling over the past two months. He was previously on Lasix but ran out of the medication and stopped taking it for a while.  Lower extremity venous Doppler to rule out DVT - no evidence of DVT. Chest x-ray was done due to diminished breath sounds on the right - small right effusion. Pt states that he does not exercise at all. He denies dizziness, lightheadedness, chest pain, palpitations, syncope, dyspnea today. for DM control . Checks blood sugar levels three times a day, he said sometimes numbers go into 200's. He is unsure what is last hgb A1c level is. Patient said he has a history of hospitalizations due to diabetes. Pt was hospitalized at [facility] [place] ; discharged 8/23/20; worsening LE edema; Legs wrapped; still edematous; reports feeling better. Renal biopsy showing 40% IFTA; severe arteriosclerosis; KW nodules; consistent with diabetic nephropathy; On metoprolol  25 mg BID; amlodipine 5 mg daily and doxazosin 4 mg daily. Cr worsening; Pt recently hospitalized at [facility]; started on HD; tunneled catheter, HD 3x/week. Denies FHx of kidney disease. Presents for preop assessment prior to Left Upper Extrenity Fistula creastion, possible graft w/Dr Celina Narvaez on 12/17   54 yo M PMH of hypertrophic cardiomyopathy, Left ventricular outflow tract obstruction, first-degree heart block, lower extremity edema, DM 2 (dx 2 years ago), HTN, seizure disorder, HLD, GERD, [employer] ILR implanted 12/7/2018, Echo 6/1/2020 showed LV normal size and function, EF 60-65%, moderate concentric LVH noted, moderate mitral regurgitation was noted that was a bit worse than his prior echo. Pt reports his lower extremity swelling has improved. Recent LE venous doppler showed no evidence of DVT. Pt states that he does not exercise. Today pt denies fevers, chills, chest pain, SOB, dizziness, lightheadedness, palpitations, syncope, or any bowel or bladder issues. Recent renal biopsy showed 40% IFTA; severe arteriosclerosis; KW nodules; consistent w/diabetic nephropathy. Pt said he has a Hx of hospitalizations due to diabetes, was recently hospitalized at [facility] and started on HD; tunneled catheter right ACW, HD 3x/week M/W/F in [place]. Pt c/o fatigue after HD. Denies FHx of kidney disease. Presents for preop assessment prior to Left Upper Extremity Fistula creation, possible graft w/Dr Celina Narvaez on 12/17

## 2020-12-08 NOTE — H&P PST ADULT - OTHER CARE PROVIDERS
Ed PCP, Chelsea (cardiology), Cammy (nephrology), Rick (endo) Ed PCP, Chelsea (cardiology), Cammy (nephrology), Marianne (nephrology)

## 2020-12-08 NOTE — H&P PST ADULT - NSICDXPASTSURGICALHX_GEN_ALL_CORE_FT
PAST SURGICAL HISTORY:  History of loop recorder      PAST SURGICAL HISTORY:  History of hemodialysis tumnneled HD catheter right ACW    History of loop recorder

## 2020-12-08 NOTE — H&P PST ADULT - ASSESSMENT
56 yo M      CAPRINI SCORE [CLOT]    AGE RELATED RISK FACTORS                                                       MOBILITY RELATED FACTORS  [x ] Age 41-60 years                                            (1 Point)                  [ ] Bed rest                                                        (1 Point)  [ ] Age: 61-74 years                                           (2 Points)                 [ ] Plaster cast                                                   (2 Points)  [ ] Age= 75 years                                              (3 Points)                 [ ] Bed bound for more than 72 hours                 (2 Points)    DISEASE RELATED RISK FACTORS                                               GENDER SPECIFIC FACTORS  [x ] Edema in the lower extremities                       (1 Point)                  [ ] Pregnancy                                                     (1 Point)  [ ] Varicose veins                                               (1 Point)                  [ ] Post-partum < 6 weeks                                   (1 Point)             [x ] BMI > 25 Kg/m2                                            (1 Point)                  [ ] Hormonal therapy  or oral contraception          (1 Point)                 [ ] Sepsis (in the previous month)                        (1 Point)                  [ ] History of pregnancy complications                 (1 point)  [ ] Pneumonia or serious lung disease                                               [ ] Unexplained or recurrent                     (1 Point)           (in the previous month)                               (1 Point)  [ ] Abnormal pulmonary function test                     (1 Point)                 SURGERY RELATED RISK FACTORS  [ ] Acute myocardial infarction                              (1 Point)                 [ ]  Section                                             (1 Point)  [ ] Congestive heart failure (in the previous month)  (1 Point)               [ ] Minor surgery                                                  (1 Point)   [ ] Inflammatory bowel disease                             (1 Point)                 [ ] Arthroscopic surgery                                        (2 Points)  [ ] Central venous access                                      (2 Points)                [ x] General surgery lasting more than 45 minutes   (2 Points)       [ ] Stroke (in the previous month)                          (5 Points)               [ ] Elective arthroplasty                                         (5 Points)                                                                                                                                               HEMATOLOGY RELATED FACTORS                                                 TRAUMA RELATED RISK FACTORS  [ ] Prior episodes of VTE                                     (3 Points)                [ ] Fracture of the hip, pelvis, or leg                       (5 Points)  [ ] Positive family history for VTE                         (3 Points)                 [ ] Acute spinal cord injury (in the previous month)  (5 Points)  [ ] Prothrombin 95487 A                                     (3 Points)                 [ ] Paralysis  (less than 1 month)                             (5 Points)  [ ] Factor V Leiden                                             (3 Points)                  [ ] Multiple Trauma within 1 month                        (5 Points)  [ ] Lupus anticoagulants                                     (3 Points)                                                           [ ] Anticardiolipin antibodies                               (3 Points)                                                       [ ] High homocysteine in the blood                      (3 Points)                                             [ ] Other congenital or acquired thrombophilia      (3 Points)                                                [ ] Heparin induced thrombocytopenia                  (3 Points)                                          Total Score [   5       ]    Caprini Score 0 - 2:  Low Risk, No VTE Prophylaxis required for most patients, encourage ambulation  Caprini Score 3 - 6:  At Risk, pharmacologic VTE prophylaxis is indicated for most patients (in the absence of a contraindication)  Caprini Score Greater than or = 7:  High Risk, pharmacologic VTE prophylaxis is indicated for most patients (in the absence of a contraindication) 54 yo M PMH of hypertrophic cardiomyopathy, Left ventricular outflow tract obstruction, first-degree heart block, lower extremity edema, DM 2 (dx 2 years ago), HTN, seizure disorder, HLD, GERD, Medtronic ILR implanted 2018, Echo 2020 showed LV normal size and function, EF 60-65%, moderate concentric LVH noted, moderate mitral regurgitation was noted that was a bit worse than his prior echo. Pt reports his lower extremity swelling has improved. Recent LE venous doppler showed no evidence of DVT. Pt states that he does not exercise. Today pt denies fevers, chills, chest pain, SOB, dizziness, lightheadedness, palpitations, syncope, or any bowel or bladder issues. Recent renal biopsy showed 40% IFTA; severe arteriosclerosis; KW nodules; consistent w/diabetic nephropathy. Pt said he has a Hx of hospitalizations due to diabetes, was recently hospitalized at [facility] and started on HD; tunneled catheter right ACW, HD 3x/week M// in [place]. Pt c/o fatigue after HD. Denies FHx of kidney disease. Presents for preop assessment prior to Left Upper Extremity Fistula creation, possible graft w/Dr Celina Narvaez on       CAPRINI SCORE [CLOT]    AGE RELATED RISK FACTORS                                                       MOBILITY RELATED FACTORS  [x ] Age 41-60 years                                            (1 Point)                  [ ] Bed rest                                                        (1 Point)  [ ] Age: 61-74 years                                           (2 Points)                 [ ] Plaster cast                                                   (2 Points)  [ ] Age= 75 years                                              (3 Points)                 [ ] Bed bound for more than 72 hours                 (2 Points)    DISEASE RELATED RISK FACTORS                                               GENDER SPECIFIC FACTORS  [x ] Edema in the lower extremities                       (1 Point)                  [ ] Pregnancy                                                     (1 Point)  [ ] Varicose veins                                               (1 Point)                  [ ] Post-partum < 6 weeks                                   (1 Point)             [x ] BMI > 25 Kg/m2                                            (1 Point)                  [ ] Hormonal therapy  or oral contraception          (1 Point)                 [ ] Sepsis (in the previous month)                        (1 Point)                  [ ] History of pregnancy complications                 (1 point)  [ ] Pneumonia or serious lung disease                                               [ ] Unexplained or recurrent                     (1 Point)           (in the previous month)                               (1 Point)  [ ] Abnormal pulmonary function test                     (1 Point)                 SURGERY RELATED RISK FACTORS  [ ] Acute myocardial infarction                              (1 Point)                 [ ]  Section                                             (1 Point)  [ ] Congestive heart failure (in the previous month)  (1 Point)               [ ] Minor surgery                                                  (1 Point)   [ ] Inflammatory bowel disease                             (1 Point)                 [ ] Arthroscopic surgery                                        (2 Points)  [ ] Central venous access                                      (2 Points)                [ x] General surgery lasting more than 45 minutes   (2 Points)       [ ] Stroke (in the previous month)                          (5 Points)               [ ] Elective arthroplasty                                         (5 Points)                                                                                                                                               HEMATOLOGY RELATED FACTORS                                                 TRAUMA RELATED RISK FACTORS  [ ] Prior episodes of VTE                                     (3 Points)                [ ] Fracture of the hip, pelvis, or leg                       (5 Points)  [ ] Positive family history for VTE                         (3 Points)                 [ ] Acute spinal cord injury (in the previous month)  (5 Points)  [ ] Prothrombin 62138 A                                     (3 Points)                 [ ] Paralysis  (less than 1 month)                             (5 Points)  [ ] Factor V Leiden                                             (3 Points)                  [ ] Multiple Trauma within 1 month                        (5 Points)  [ ] Lupus anticoagulants                                     (3 Points)                                                           [ ] Anticardiolipin antibodies                               (3 Points)                                                       [ ] High homocysteine in the blood                      (3 Points)                                             [ ] Other congenital or acquired thrombophilia      (3 Points)                                                [ ] Heparin induced thrombocytopenia                  (3 Points)                                          Total Score [   5       ]    Caprini Score 0 - 2:  Low Risk, No VTE Prophylaxis required for most patients, encourage ambulation  Caprini Score 3 - 6:  At Risk, pharmacologic VTE prophylaxis is indicated for most patients (in the absence of a contraindication)  Caprini Score Greater than or = 7:  High Risk, pharmacologic VTE prophylaxis is indicated for most patients (in the absence of a contraindication)

## 2020-12-08 NOTE — H&P PST ADULT - NSICDXPASTMEDICALHX_GEN_ALL_CORE_FT
PAST MEDICAL HISTORY:  1st degree AV block     Bilateral lower extremity edema     Chronic CHF     Chronic renal disease     Diabetes     End stage renal disease     HTN (hypertension)      PAST MEDICAL HISTORY:  1st degree AV block     Bilateral lower extremity edema     Chronic renal disease     Diabetes     End stage renal disease     ESRD on hemodialysis right ACW tunneled catheter, HD M/W/F    History of loop recorder implented 12/7/2018 Medtronic model [id number]    History of seizure disorder     HTN (hypertension)     Mitral regurgitation

## 2020-12-08 NOTE — PATIENT PROFILE ADULT - NSPROHMSYMPCOND_GEN_A_NUR
Dialysis pt MWF    Pre op teaching surgical scrub pain management instructions given to pt    covid swab to be done Dec 12

## 2020-12-08 NOTE — H&P PST ADULT - EKG AND INTERPRETATION
11/10/2020 at cardiologist's office: sinus bradycardia, 1st degree AV block, HR 42, pt obtained cardiac clearance from Dr Tran

## 2020-12-08 NOTE — H&P PST ADULT - MALLAMPATI CLASS
Class II - visualization of the soft palate, fauces, and uvula tongue deviates to left/Class IV (difficult) - the soft palate is not visible at all

## 2020-12-09 PROBLEM — N18.6 END STAGE RENAL DISEASE: Chronic | Status: ACTIVE | Noted: 2020-12-08

## 2020-12-09 PROBLEM — I34.0 NONRHEUMATIC MITRAL (VALVE) INSUFFICIENCY: Chronic | Status: ACTIVE | Noted: 2020-12-08

## 2020-12-09 PROBLEM — Z86.69 PERSONAL HISTORY OF OTHER DISEASES OF THE NERVOUS SYSTEM AND SENSE ORGANS: Chronic | Status: ACTIVE | Noted: 2020-12-08

## 2020-12-10 ENCOUNTER — APPOINTMENT (OUTPATIENT)
Dept: CARDIOLOGY | Facility: CLINIC | Age: 55
End: 2020-12-10
Payer: MEDICAID

## 2020-12-10 VITALS
BODY MASS INDEX: 34.86 KG/M2 | WEIGHT: 249 LBS | TEMPERATURE: 98.2 F | DIASTOLIC BLOOD PRESSURE: 80 MMHG | HEIGHT: 71 IN | RESPIRATION RATE: 16 BRPM | HEART RATE: 63 BPM | SYSTOLIC BLOOD PRESSURE: 138 MMHG

## 2020-12-10 PROCEDURE — 93000 ELECTROCARDIOGRAM COMPLETE: CPT

## 2020-12-10 PROCEDURE — 99072 ADDL SUPL MATRL&STAF TM PHE: CPT

## 2020-12-10 PROCEDURE — 99214 OFFICE O/P EST MOD 30 MIN: CPT

## 2020-12-10 NOTE — HISTORY OF PRESENT ILLNESS
[FreeTextEntry1] : Patient presents back today having been in the hospital in November with hyperkalemia and ultimately was started on dialysis.  He is now on dialysis 3 days a week and is planning fistula placement.  Since being on dialysis his edema has improved a lot.  His tremor has improved as well.  He was having some issues with low blood pressure on dialysis and therefore his medication was adjusted.  He is not reporting any shortness of breath at all at this time.  He does not have any other new symptoms at this time.  Patient denies chest pain, palpitations, orthopnea, presyncope, syncope.

## 2020-12-10 NOTE — HISTORY OF PRESENT ILLNESS
[FreeTextEntry1] : SARAH MORFIN is a 55 year old male with hypertension, diabetes mellitus, seizure disorder, ESRD on HD (secondary to Diabetic Nephropathy), hypertrophic cardiomyopathy with LVOT obstruction and ILR implant who presents for follow up.\par \par To summarize his history, he saw Dr. Johansen in 10/2018 for consideration of ILR implantation for surveillance of occult AF and NSVT as patient had a history of "seizures." He underwent ILR implantation on 12/7/2018. Since then his ILR has demonstrated increasing episodes of AV block which were first noticed in early 2020 including periods of complete heart block, though these occur predominantly at night. Review of rate histograms has also shown progressive bradycardia.\par \par Of note, he had CKD IV secondary to diabetic nephropathy. He was hospitalized at [facility] in 11/2020 and required initiation of hemodialysis via a tunneled catheter ([facility] Permacath). He is awaiting left upper extremity AV fistula placement by Dr. Narvaez.\par \par He also performed an incomplete sleep study as he was rushed to the hospital after his nephrologist called him to inform him that his kidney function was quite abnormal.\par \par Today, he states he feels OK. He states he is able to ambulate around the block once. He is able to walk up about 1 flight of stairs without difficulty. He states that he cannot do any of these things quickly and has to "take it easy." He denies getting any dizziness or lightheadedness with ambulation.\par \par He denies any fevers, chills, cough, sweats, chest pain, palpitations, orthopnea, paroxysmal nocturnal dyspnea, peripheral edema, lightheadedness, dizziness, syncope, nausea, vomiting, melena, hematochezia, or hematemesis.

## 2020-12-10 NOTE — PHYSICAL EXAM
[General Appearance - Well Developed] : well developed [Normal Appearance] : normal appearance [General Appearance - Well Nourished] : well nourished [Normal Conjunctiva] : the conjunctiva exhibited no abnormalities [Normal Jugular Venous V Waves Present] : normal jugular venous V waves present [Heart Rate And Rhythm] : heart rate and rhythm were normal [Heart Sounds] : normal S1 and S2 [Respiration, Rhythm And Depth] : normal respiratory rhythm and effort [Exaggerated Use Of Accessory Muscles For Inspiration] : no accessory muscle use [Auscultation Breath Sounds / Voice Sounds] : lungs were clear to auscultation bilaterally [Bowel Sounds] : normal bowel sounds [Abdomen Soft] : soft [Abdomen Tenderness] : non-tender [Abnormal Walk] : normal gait [Nail Clubbing] : no clubbing of the fingernails [Cyanosis, Localized] : no localized cyanosis [Skin Color & Pigmentation] : normal skin color and pigmentation [Skin Turgor] : normal skin turgor [] : no rash [Oriented To Time, Place, And Person] : oriented to person, place, and time [Impaired Insight] : insight and judgment were intact [No Anxiety] : not feeling anxious [FreeTextEntry1] : II/VI systolic murmur at LUSB, trace bilateral pitting edema

## 2020-12-10 NOTE — DISCUSSION/SUMMARY
[FreeTextEntry1] : SARAH MORFIN is a 55 year old male with hypertension, diabetes mellitus, seizure disorder, ERSD on HD (secondary to Diabetic Nephropathy), hypertrophic cardiomyopathy with LVOT obstruction and ILR implant who presents for follow up.\par \par His ECGs and ILR interrogations have shown increasing episodes of AV natacha conduction delay which have occurred over the past year. These episodes initially started at night and now have occurred even during daytime hours (one episode at 4:20 PM). He is quite young to have developed this degree of AV natacha disease. I am concerned about an infiltrative process. In discussion with Dr. Tran, the patient has reportedly had a cardiac MRI in the past which was consistent hypertrophic cardiomyopathy. He could still have an infiltrative disease process and it is peculiar that his ECG demonstrates no evidence of LVH. As such, we will obtain a cardiac PET to assess for cardiac sarcoidosis and also a nuclear pyrophosphate scan to look for amyloidosis. Amyloidosis would be less likely considering the slowly progressive nature of his disease.\par \par I would also recommend an exercise treadmill test to ensure he had adequate AV conduction with exercise. His ILR does show a left shift in rate histograms suggestive of sinus node dysfunction but may also reflect poor activity.\par \par It is likely he will need a pacemaker at some point but considering he is young and now has ESRD requiring HD, I feel it would be best to wait as long as possible.\par \par Recommendations:\par - Cardiac PET\par - Nuclear Pyrophosphate Scan\par - Exercise Treadmill Test\par - Complete Sleep Study\par - Continue remote ILR monitoring\par - Discussed with Dr. Narvaez to consider right sided AVF as he will likely need a device

## 2020-12-10 NOTE — ASSESSMENT
[FreeTextEntry1] : Echocardiogram June 1, 2020 pressures left ventricle normal size and function with ejection fraction of 60-65%. Moderate concentric LVH noted. Moderate mitral regurgitation is noted which is a bit worse than his prior echo.\par \par EKG: Sinus rhythm varying between first-degree and second-degree type I heart block.  No ST or T wave changes.\par \par 55-year-old man with a past medical history of hypertrophic cardiomyopathy, hypertension, diabetes who presents to me for followup evaluation.  Patient returns today having recently been in the hospital for hyperkalemia and was finally started on dialysis.  He is now continue with dialysis and is pending a AV fistula.  He followed up with EP and was suggested to have scans to rule out sarcoidosis and amyloidosis given his cardiomyopathy and renal disease.  For now he remains without indication for pacemaker placement.  His EKG today again shows varying first and second-degree heart block.  His loop recorder shows the same.  He is still asymptomatic in that regard.  There is no evidence of acute heart failure and in fact his edema has improved a lot with dialysis.  Medications have been adjusted because of some hypotension with dialysis.  He is off his beta-blocker and I will hold off for now given the very mild LVH seen on his last echo and his continued issues with conduction disturbances and hypotension at dialysis.  May reconsider at follow-up.  It has been suggested by EP that he had an exercise stress test but I will do a nuclear stress test also rule out any ischemic heart disease given his varying risk factors.

## 2020-12-11 ENCOUNTER — APPOINTMENT (OUTPATIENT)
Dept: CARDIOLOGY | Facility: CLINIC | Age: 55
End: 2020-12-11
Payer: MEDICAID

## 2020-12-11 PROCEDURE — G2066: CPT

## 2020-12-11 PROCEDURE — 93298 REM INTERROG DEV EVAL SCRMS: CPT

## 2020-12-11 NOTE — ASU PREOP CHECKLIST - BMI (KG/M2)
37.7 Rhombic Flap Text: The defect edges were debeveled with a #15 scalpel blade.  Given the location of the defect and the proximity to free margins a rhombic flap was deemed most appropriate.  Using a sterile surgical marker, an appropriate rhombic flap was drawn incorporating the defect.    The area thus outlined was incised deep to adipose tissue with a #15 scalpel blade.  The skin margins were undermined to an appropriate distance in all directions utilizing iris scissors.

## 2020-12-13 ENCOUNTER — APPOINTMENT (OUTPATIENT)
Dept: DISASTER EMERGENCY | Facility: CLINIC | Age: 55
End: 2020-12-13

## 2020-12-15 ENCOUNTER — NON-APPOINTMENT (OUTPATIENT)
Age: 55
End: 2020-12-15

## 2020-12-15 ENCOUNTER — TRANSCRIPTION ENCOUNTER (OUTPATIENT)
Age: 55
End: 2020-12-15

## 2020-12-15 LAB — SARS-COV-2 N GENE NPH QL NAA+PROBE: NOT DETECTED

## 2020-12-15 RX ORDER — CEFAZOLIN SODIUM 1 G
2000 VIAL (EA) INJECTION ONCE
Refills: 0 | Status: COMPLETED | OUTPATIENT
Start: 2020-12-16 | End: 2020-12-16

## 2020-12-16 ENCOUNTER — OUTPATIENT (OUTPATIENT)
Dept: OUTPATIENT SERVICES | Facility: HOSPITAL | Age: 55
LOS: 1 days | End: 2020-12-16
Payer: COMMERCIAL

## 2020-12-16 VITALS
OXYGEN SATURATION: 98 % | SYSTOLIC BLOOD PRESSURE: 165 MMHG | TEMPERATURE: 98 F | RESPIRATION RATE: 15 BRPM | HEART RATE: 82 BPM | DIASTOLIC BLOOD PRESSURE: 87 MMHG

## 2020-12-16 VITALS
OXYGEN SATURATION: 99 % | TEMPERATURE: 97 F | WEIGHT: 240.08 LBS | SYSTOLIC BLOOD PRESSURE: 182 MMHG | RESPIRATION RATE: 16 BRPM | HEIGHT: 71 IN | HEART RATE: 82 BPM | DIASTOLIC BLOOD PRESSURE: 79 MMHG

## 2020-12-16 DIAGNOSIS — Z98.890 OTHER SPECIFIED POSTPROCEDURAL STATES: Chronic | ICD-10-CM

## 2020-12-16 DIAGNOSIS — N18.6 END STAGE RENAL DISEASE: ICD-10-CM

## 2020-12-16 DIAGNOSIS — Z92.89 PERSONAL HISTORY OF OTHER MEDICAL TREATMENT: Chronic | ICD-10-CM

## 2020-12-16 LAB
ABO RH CONFIRMATION: SIGNIFICANT CHANGE UP
ANION GAP SERPL CALC-SCNC: 10 MMOL/L — SIGNIFICANT CHANGE UP (ref 5–17)
BUN SERPL-MCNC: 39 MG/DL — HIGH (ref 8–20)
CALCIUM SERPL-MCNC: 8.4 MG/DL — LOW (ref 8.6–10.2)
CHLORIDE SERPL-SCNC: 100 MMOL/L — SIGNIFICANT CHANGE UP (ref 98–107)
CO2 SERPL-SCNC: 28 MMOL/L — SIGNIFICANT CHANGE UP (ref 22–29)
CREAT SERPL-MCNC: 2.88 MG/DL — HIGH (ref 0.5–1.3)
GLUCOSE SERPL-MCNC: 170 MG/DL — HIGH (ref 70–99)
POTASSIUM SERPL-MCNC: 4.6 MMOL/L — SIGNIFICANT CHANGE UP (ref 3.5–5.3)
POTASSIUM SERPL-SCNC: 4.6 MMOL/L — SIGNIFICANT CHANGE UP (ref 3.5–5.3)
SODIUM SERPL-SCNC: 138 MMOL/L — SIGNIFICANT CHANGE UP (ref 135–145)

## 2020-12-16 PROCEDURE — C1889: CPT

## 2020-12-16 PROCEDURE — 36821 AV FUSION DIRECT ANY SITE: CPT

## 2020-12-16 PROCEDURE — 82962 GLUCOSE BLOOD TEST: CPT

## 2020-12-16 PROCEDURE — 36415 COLL VENOUS BLD VENIPUNCTURE: CPT

## 2020-12-16 PROCEDURE — 80048 BASIC METABOLIC PNL TOTAL CA: CPT

## 2020-12-16 RX ORDER — ONDANSETRON 8 MG/1
4 TABLET, FILM COATED ORAL ONCE
Refills: 0 | Status: DISCONTINUED | OUTPATIENT
Start: 2020-12-16 | End: 2020-12-16

## 2020-12-16 RX ORDER — SODIUM CHLORIDE 9 MG/ML
1000 INJECTION, SOLUTION INTRAVENOUS
Refills: 0 | Status: DISCONTINUED | OUTPATIENT
Start: 2020-12-16 | End: 2020-12-16

## 2020-12-16 RX ORDER — TRAMADOL HYDROCHLORIDE 50 MG/1
0.5 TABLET ORAL
Qty: 3 | Refills: 0
Start: 2020-12-16 | End: 2020-12-17

## 2020-12-16 RX ORDER — HYDRALAZINE HCL 50 MG
10 TABLET ORAL ONCE
Refills: 0 | Status: COMPLETED | OUTPATIENT
Start: 2020-12-16 | End: 2020-12-16

## 2020-12-16 RX ORDER — SODIUM CHLORIDE 9 MG/ML
3 INJECTION INTRAMUSCULAR; INTRAVENOUS; SUBCUTANEOUS ONCE
Refills: 0 | Status: DISCONTINUED | OUTPATIENT
Start: 2020-12-16 | End: 2020-12-16

## 2020-12-16 RX ORDER — HYDROMORPHONE HYDROCHLORIDE 2 MG/ML
0.5 INJECTION INTRAMUSCULAR; INTRAVENOUS; SUBCUTANEOUS
Refills: 0 | Status: DISCONTINUED | OUTPATIENT
Start: 2020-12-16 | End: 2020-12-16

## 2020-12-16 RX ADMIN — HYDROMORPHONE HYDROCHLORIDE 0.5 MILLIGRAM(S): 2 INJECTION INTRAMUSCULAR; INTRAVENOUS; SUBCUTANEOUS at 15:21

## 2020-12-16 RX ADMIN — Medication 10 MILLIGRAM(S): at 15:36

## 2020-12-16 RX ADMIN — Medication 100 MILLIGRAM(S): at 13:45

## 2020-12-16 NOTE — ASU DISCHARGE PLAN (ADULT/PEDIATRIC) - CALL YOUR DOCTOR IF YOU HAVE ANY OF THE FOLLOWING:
Bleeding that does not stop/Swelling that gets worse/Pain not relieved by Medications/Fever greater than (need to indicate Fahrenheit or Celsius) Bleeding that does not stop/Swelling that gets worse/Pain not relieved by Medications/Fever greater than (need to indicate Fahrenheit or Celsius)/Numbness, tingling, color or temperature change to extremity/Nausea and vomiting that does not stop/Unable to urinate/Inability to tolerate liquids or foods/Increased irritability or sluggishness

## 2020-12-16 NOTE — ASU DISCHARGE PLAN (ADULT/PEDIATRIC) - ASU DC SPECIAL INSTRUCTIONSFT
Follow up: Please call and make an appointment with Dr. Narvaez in 2 weeks. Please also follow up with your PCP. You may resume dialysis as scheduled    Activity: May return to normal activities as tolerated    Diet: May continue regular diet.    Medications: Please take all home medications as prescribed by your primary care doctor. Please take tylenol. you may also take tramadol 25 every 8 hours    Wound Care: Please, keep wound site clean and dry. You may shower, but do not bathe.    Patient is advised to RETURN TO THE EMERGENCY DEPARTMENT for any of the following - worsening pain, fever/chills, nausea/vomiting, altered mental status, chest pain, shortness of breath, or any other new / worsening symptom.

## 2020-12-16 NOTE — BRIEF OPERATIVE NOTE - OPERATION/FINDINGS
primary brachiocephalic anastomosis. hemostasis achieved. closed with vicryl and monocryl. palpable 2+

## 2020-12-22 ENCOUNTER — APPOINTMENT (OUTPATIENT)
Dept: TRANSPLANT | Facility: CLINIC | Age: 55
End: 2020-12-22
Payer: COMMERCIAL

## 2020-12-22 ENCOUNTER — APPOINTMENT (OUTPATIENT)
Dept: NEPHROLOGY | Facility: CLINIC | Age: 55
End: 2020-12-22
Payer: COMMERCIAL

## 2020-12-22 VITALS
HEIGHT: 78 IN | DIASTOLIC BLOOD PRESSURE: 77 MMHG | TEMPERATURE: 97.6 F | SYSTOLIC BLOOD PRESSURE: 127 MMHG | RESPIRATION RATE: 16 BRPM | BODY MASS INDEX: 28.93 KG/M2 | OXYGEN SATURATION: 98 % | WEIGHT: 250 LBS | HEART RATE: 86 BPM

## 2020-12-22 PROCEDURE — 99205 OFFICE O/P NEW HI 60 MIN: CPT

## 2020-12-22 PROCEDURE — 99072 ADDL SUPL MATRL&STAF TM PHE: CPT

## 2020-12-22 NOTE — PHYSICAL EXAM
[___ (cm) Fistula] : [unfilled] (cm) fistula [Dialysis Catheter] : dialysis catheter [Bruit] : a bruit was present [Thrill] : a thrill was present [General Appearance - Alert] : alert [General Appearance - In No Acute Distress] : in no acute distress [Sclera] : the sclera and conjunctiva were normal [Neck Cervical Mass (___cm)] : no neck mass was observed [Jugular Venous Distention Increased] : there was no jugular-venous distention [Respiration, Rhythm And Depth] : normal respiratory rhythm and effort [Exaggerated Use Of Accessory Muscles For Inspiration] : no accessory muscle use [Auscultation Breath Sounds / Voice Sounds] : lungs were clear to auscultation bilaterally [Apical Impulse] : the apical impulse was normal [Heart Rate And Rhythm] : heart rate was normal and rhythm regular [Heart Sounds] : normal S1 and S2 [Heart Sounds Gallop] : no gallops [Heart Sounds Pericardial Friction Rub] : no pericardial rub [Arterial Pulses Carotid] : carotid pulses were normal with no bruits [Bowel Sounds] : normal bowel sounds [Abdomen Soft] : soft [Abdomen Tenderness] : non-tender [Abdomen Mass (___ Cm)] : no abdominal mass palpated [Abdomen Hernia] : no hernia was discovered [No CVA Tenderness] : no ~M costovertebral angle tenderness [No Spinal Tenderness] : no spinal tenderness [Nail Clubbing] : no clubbing  or cyanosis of the fingernails [Involuntary Movements] : no involuntary movements were seen [Musculoskeletal - Swelling] : no joint swelling seen [Skin Lesions] : no skin lesions [] : right femoral palpable [No Focal Deficits] : no focal deficits [Oriented To Time, Place, And Person] : oriented to person, place, and time [Impaired Insight] : insight and judgment were intact [Affect] : the affect was normal [Mood] : the mood was normal [FreeTextEntry1] : No ulcers

## 2020-12-22 NOTE — CONSULT LETTER
[Dear  ___] : Dear  [unfilled], [Consult Letter:] : I had the pleasure of evaluating your patient, [unfilled]. [Please see my note below.] : Please see my note below. [Consult Closing:] : Thank you very much for allowing me to participate in the care of this patient.  If you have any questions, please do not hesitate to contact me. [Sincerely,] : Sincerely, [FreeTextEntry2] : Dave Lopes MD [FreeTextEntry3] : [place]

## 2020-12-22 NOTE — REVIEW OF SYSTEMS
[Fatigue] : fatigue [Recent Weight Loss (___ Lbs)] : recent [unfilled] ~Ulb weight loss [Wears glasses] : wears glasses [Can Walk (___ Blocks)] : can walk [unfilled] blocks [Dyspnea on Exertion] : dyspnea on exertion [Unsteady Gait] : unsteady gait [Seizures] : seizures [Fever] : no fever [Chills] : no chills [Night Sweats] : no night sweats [Recent Weight Gain (___ Lbs)] : no recent weight gain [Sore throat] : no sore throat [Sclera anicteric] : sclera icteric [Double vision] : no double vision [Blurred Vision] : no blurred vision [Chest Pain] : no chest pain [Palpitations] : no palpitations [SOB] : no shortness of breath [Wheezing] : no wheezing [Cough] : no cough [Pleuritic Chest Pain] : no pleuritic chest pain [Abdominal Pain] : no abdominal pain [Nausea] : no nausea [Constipation] : no constipation [Diarrhea] : diarrhea [Vomiting] : no vomiting [Dysuria] : no dysuria [Frequency] : no frequency [Urgency] : no urinary urgency [Hematuria] : no hematuria [UTI] : no UTI [Joint Pain] : no joint pain [Joint Stiffness] : no joint stiffness [Muscle Pain] : no muscle pain [Tattoos] : no tattoos [Itching] : no itching [Skin Rash] : no skin rash [Headache] : no headache [Dizziness] : no dizziness [Fainting] : no fainting [Confusion] : no confusion [Memory Loss] : no memory loss [Suicidal] : not suicidal [Hallucinations] : no hallucinations [Anxiety] : no anxiety [Depression] : no depression [Easy Bleeding] : no easy bleeding [Easy Bruising] : no easy bruising

## 2020-12-22 NOTE — PLAN
[We should proceed with our protocol for kidney transplantation evaluation.] : We should proceed with our protocol for kidney transplantation evaluation. [TextBox_6] : CT angio with runoff to evaluate peripheral vascular disease

## 2020-12-22 NOTE — HISTORY OF PRESENT ILLNESS
[Diabetes Mellitus] : Diabetes Mellitus [Cardiac History] : cardiac history [Previous Kidney Transplant] : no previous kidney transplant [Blood Transfusion] : no prior blood transfusion [Claudication/Angina] : claudication/angina [Hx of DVT/Thrombosis/Miscarriage] : no history of dvt/thrombosis/miscarriage [Diabetes] : diabetes [Retinopathy] : retinopathy [Neuropathy] : neuropathy [Annual Foot Exams] : annual foot exams [Lower Extremity Ulcers] : no lower extremity ulcers [Insulin] : insulin treatment [TextBox_42] : Carissa (sister) also present\par Born in the [place]\par Had a history of a seizure approximately 5 years age. Attributed potentially to diabetes\par Father: alive - age 80 - CVA\par Mother: alive - age 83 - DM\par No family history of kidney disease.\par Currently not working.\par Previously involved in [employer]\par \par 1 daughter - healthy\par Smoking: denies\par Drinking: denies\par Sister potential live donor [TextBox_16] : 2020 [TextBox_20] : 2020 [TextBox_24] : at age 52 [TextBox_28] : at age 52 [TextBox_30] : 1 block [TextBox_39] : at age 52

## 2020-12-22 NOTE — ASSESSMENT
[FreeTextEntry1] : \par Assessment \par Mr. SARAH MORFIN is a 55 year old man evaluated for kidney transplantation. All his medical problems were diagnosed in 2017 due to poor prior medical follow up. \par  \par The etiology of his ESRD is biopsy proven diabetic nephropathy. He has been on dialysis since 11/2020. \par \par Diabetes type II is complicated by retinopathy and neuropathy in addition to nephropathy. He had poor prior medical follow up in the past but is now regularly followed by PMD, podiatry and opthalmology. Most recent Hb A1c 6.8%. \par \par Hypertension was previously on 4 medications, now off all medications due to intradialytic hypotension. BP fairly well controlled. \par \par Seizure disorder dx in 6/2018 - patient only had a single episode of seizure, remains on Depakote, no recurrent seizure. Followed by neurology. \par \par He has history of hypertrophic cardiomyopathy with LVOT followed by cardiologist. Most recent echo in 8/2020 without mention of outflow obstruction, has normal EF and no significant valvular disease. \par He has no history of ischemic heart disease, cath in 2017 showed only 20% LAD lesion. \par \par He has no known PAD and no history of foot ulcers or claudications but has diminished right pedal pulse suggestive of PAD. \par \par His BMI is 34, his functional status is marginal (walks 1 block, climbs a fight of stairs slowly) but improving since initiation of dialysis. \par \par Mr. Morfin has multiple comorbidities described above but he is young and his symptoms and functional status are improving since initiation of dialysis a month ago. There is no evidence of an active medical or psychiatric illness, malignancy or infection that would preclude kidney transplantation. His sister is a potential donor. He is a good candidate for transplantation.

## 2020-12-22 NOTE — REASON FOR VISIT
[Initial] : an initial visit for [Kidney Transplant Evaluation] : kidney transplant evaluation [FreeTextEntry2] : Dave Lopes MD

## 2020-12-22 NOTE — REVIEW OF SYSTEMS
[Fever] : no fever [Chills] : no chills [Fatigue] : no fatigue [Night Sweats] : no night sweats [Recent Weight Gain (___ Lbs)] : no recent weight gain [Recent Weight Loss (___ Lbs)] : recent [unfilled] ~Ulb weight loss [Sore throat] : no sore throat [Pain/Stiffness] : no pain/stiffness [Rhinorrhea] : no rhinorrhea [Trauma] : no trauma [Sclera anicteric] : sclera icteric [Double vision] : no double vision [Blurred Vision] : no blurred vision [Eye Pain] : no eye pain [Wears glasses] : wears glasses [Chest Pain] : no chest pain [Palpitations] : no palpitations [Can Walk (___ Blocks)] : can walk [unfilled] blocks [SOB] : no shortness of breath [Wheezing] : no wheezing [Cough] : no cough [Dyspnea on Exertion] : no dyspnea on exertion [Pleuritic Chest Pain] : no pleuritic chest pain [Abdominal Pain] : no abdominal pain [Nausea] : no nausea [Constipation] : no constipation [Diarrhea] : diarrhea [Vomiting] : no vomiting [Dysuria] : no dysuria [Frequency] : no frequency [Urgency] : no urinary urgency [Incontinence] : no incontinence [Hematuria] : no hematuria [UTI] : no UTI [Urine Output: ____] : Urine Output: [unfilled] [Joint Pain] : no joint pain [Joint Stiffness] : no joint stiffness [Muscle Pain] : no muscle pain [Muscle Weakness] : no muscle weakness [Tattoos] : no tattoos [Itching] : no itching [Skin Rash] : no skin rash [Hair Changes] : no hair changes [Headache] : no headache [Dizziness] : no dizziness [Fainting] : no fainting [Confusion] : no confusion [Memory Loss] : no memory loss [Unsteady Gait] : unsteady gait [Seizures] : seizures [Suicidal] : not suicidal [Hallucinations] : no hallucinations [Anxiety] : no anxiety [Depression] : no depression [Anemia] : anemia [Adenopathy] : no adenopathy [Easy Bleeding] : no easy bleeding [Easy Bruising] : no easy bruising

## 2020-12-22 NOTE — PLAN
[FreeTextEntry1] : \par Proceed with completing the pre transplant work up \par - Pre transplant labs will be drawn today  \par - Cardiac evaluation: Pharmacologic nuclear stress test, cardiology referral \par - Refer for Colonoscopy \par - Imaging \par   CXR \par   CT  abdomen/pelvis with run offs to evaluate vessels \par \par \par Once workup is complete, patient will be presented for review by the multidisciplinary listing committee. \par \par

## 2020-12-22 NOTE — PHYSICAL EXAM
[Well Developed] : well developed [Well Nourished] : well nourished [No Acute Distress] : no acute distress [Normocephalic] : normocephalic [Atraumatic] : atraumatic [Sclera Anicteric] : sclera anicteric [Neck Supple] : neck supple [Clear to Auscultation] : clear to auscultation [Breathing Comfortably on RA] : breathing comfortably on room air [Normal Rate] : normal rate [Regular Rhythm] : regular rhythm [Murmur] : murmur [Systolic Murmur] : systolic murmur [Soft] : soft [Non-tender] : non-tender [LE Edema] : LE edema [In Right Arm] : fistula/graft in right arm [] : right dorsalis pedis non-palpable [Alert] : alert [Responds to Questions Appropriately] : responds to questions appropriately [Oriented] : oriented [Appropriate] : appropriate [FreeTextEntry1] : No carotid bruits [TextBox_16] : Prominent systolic murmur [TextBox_34] : Bilateral edema.  No ulcers [TextBox_86] : No adenopathy
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
## 2020-12-22 NOTE — REASON FOR VISIT
[Initial] : an initial visit for [Kidney Transplant Evaluation] : kidney transplant evaluation [Other: _____] : [unfilled]

## 2020-12-22 NOTE — ASSESSMENT
[Fair candidate] : a fair candidate. We should proceed with our protocol for evaluation for kidney transplantation. [FreeTextEntry1] : As I discussed with patient, my main concern is his cardiac status.

## 2020-12-29 LAB
ABO + RH PNL BLD: NORMAL
ABO + RH PNL BLD: NORMAL
ALBUMIN SERPL ELPH-MCNC: 3.6 G/DL
ALP BLD-CCNC: 89 U/L
ALT SERPL-CCNC: <5 U/L
ANION GAP SERPL CALC-SCNC: 11 MMOL/L
AST SERPL-CCNC: 10 U/L
BASOPHILS # BLD AUTO: 0.05 K/UL
BASOPHILS NFR BLD AUTO: 0.8 %
BILIRUB SERPL-MCNC: 0.3 MG/DL
BUN SERPL-MCNC: 21 MG/DL
CALCIUM SERPL-MCNC: 8.2 MG/DL
CHLORIDE SERPL-SCNC: 99 MMOL/L
CHOLEST SERPL-MCNC: 227 MG/DL
CMV IGG SERPL QL: 7.3 U/ML
CMV IGG SERPL-IMP: POSITIVE
CO2 SERPL-SCNC: 29 MMOL/L
CREAT SERPL-MCNC: 3.67 MG/DL
EBV DNA SERPL NAA+PROBE-ACNC: NOT DETECTED IU/ML
EBV EA AB SER IA-ACNC: <5 U/ML
EBV EA AB TITR SER IF: POSITIVE
EBV EA IGG SER QL IA: 498 U/ML
EBV EA IGG SER-ACNC: NEGATIVE
EBV EA IGM SER IA-ACNC: NEGATIVE
EBV PATRN SPEC IB-IMP: NORMAL
EBV VCA IGG SER IA-ACNC: >750 U/ML
EBV VCA IGM SER QL IA: <10 U/ML
EOSINOPHIL # BLD AUTO: 0.16 K/UL
EOSINOPHIL NFR BLD AUTO: 2.6 %
EPSTEIN-BARR VIRUS CAPSID ANTIGEN IGG: POSITIVE
ESTIMATED AVERAGE GLUCOSE: 160 MG/DL
GLUCOSE SERPL-MCNC: 192 MG/DL
HAV IGM SER QL: NONREACTIVE
HBA1C MFR BLD HPLC: 7.2 %
HBV CORE IGG+IGM SER QL: NONREACTIVE
HBV SURFACE AB SER QL: NONREACTIVE
HBV SURFACE AB SERPL IA-ACNC: <3 MIU/ML
HBV SURFACE AG SER QL: NONREACTIVE
HCT VFR BLD CALC: 29.5 %
HCV AB SER QL: NONREACTIVE
HCV S/CO RATIO: 0.1 S/CO
HDLC SERPL-MCNC: 28 MG/DL
HEPATITIS A IGG ANTIBODY: REACTIVE
HGB BLD-MCNC: 9.9 G/DL
HIV1+2 AB SPEC QL IA.RAPID: NONREACTIVE
HSV 1+2 IGG SER IA-IMP: NEGATIVE
HSV 1+2 IGG SER IA-IMP: POSITIVE
HSV1 IGG SER QL: >62.2 INDEX
HSV2 IGG SER QL: 0.38 INDEX
IMM GRANULOCYTES NFR BLD AUTO: 0.5 %
LDLC SERPL CALC-MCNC: NORMAL MG/DL
LYMPHOCYTES # BLD AUTO: 1.99 K/UL
LYMPHOCYTES NFR BLD AUTO: 32 %
M TB IFN-G BLD-IMP: POSITIVE
MAGNESIUM SERPL-MCNC: 2.1 MG/DL
MAN DIFF?: NORMAL
MCHC RBC-ENTMCNC: 28.7 PG
MCHC RBC-ENTMCNC: 33.6 GM/DL
MCV RBC AUTO: 85.5 FL
MONOCYTES # BLD AUTO: 0.3 K/UL
MONOCYTES NFR BLD AUTO: 4.8 %
NEUTROPHILS # BLD AUTO: 3.68 K/UL
NEUTROPHILS NFR BLD AUTO: 59.3 %
NONHDLC SERPL-MCNC: 199 MG/DL
PHOSPHATE SERPL-MCNC: 2.9 MG/DL
PLATELET # BLD AUTO: 207 K/UL
POTASSIUM SERPL-SCNC: 3.9 MMOL/L
PROT SERPL-MCNC: 6.4 G/DL
PSA SERPL-MCNC: 0.53 NG/ML
QUANTIFERON TB PLUS MITOGEN MINUS NIL: >10 IU/ML
QUANTIFERON TB PLUS NIL: 0.1 IU/ML
QUANTIFERON TB PLUS TB1 MINUS NIL: 3.66 IU/ML
QUANTIFERON TB PLUS TB2 MINUS NIL: 3.26 IU/ML
RBC # BLD: 3.45 M/UL
RBC # FLD: 12.7 %
RUBV IGG FLD-ACNC: 2.6 INDEX
RUBV IGG SER-IMP: POSITIVE
SARS-COV-2 IGG SERPL IA-ACNC: 0.06 INDEX
SARS-COV-2 IGG SERPL QL IA: NEGATIVE
SODIUM SERPL-SCNC: 139 MMOL/L
T GONDII AB SER-IMP: POSITIVE
T GONDII IGG SER QL: 65.7 IU/ML
T PALLIDUM AB SER QL IA: NEGATIVE
TRIGL SERPL-MCNC: 405 MG/DL
VZV AB TITR SER: POSITIVE
VZV IGG SER IF-ACNC: 1259 INDEX
WBC # FLD AUTO: 6.21 K/UL

## 2021-01-05 DIAGNOSIS — Z12.11 ENCOUNTER FOR SCREENING FOR MALIGNANT NEOPLASM OF COLON: ICD-10-CM

## 2021-01-12 ENCOUNTER — APPOINTMENT (OUTPATIENT)
Dept: INFECTIOUS DISEASE | Facility: CLINIC | Age: 56
End: 2021-01-12
Payer: MEDICAID

## 2021-01-12 ENCOUNTER — OUTPATIENT (OUTPATIENT)
Dept: OUTPATIENT SERVICES | Facility: HOSPITAL | Age: 56
LOS: 1 days | End: 2021-01-12
Payer: MEDICAID

## 2021-01-12 VITALS
WEIGHT: 244 LBS | OXYGEN SATURATION: 97 % | HEART RATE: 82 BPM | BODY MASS INDEX: 34.16 KG/M2 | SYSTOLIC BLOOD PRESSURE: 147 MMHG | TEMPERATURE: 98.1 F | HEIGHT: 71 IN | DIASTOLIC BLOOD PRESSURE: 76 MMHG

## 2021-01-12 DIAGNOSIS — Z23 ENCOUNTER FOR IMMUNIZATION: ICD-10-CM

## 2021-01-12 DIAGNOSIS — Z98.890 OTHER SPECIFIED POSTPROCEDURAL STATES: Chronic | ICD-10-CM

## 2021-01-12 DIAGNOSIS — Z92.89 PERSONAL HISTORY OF OTHER MEDICAL TREATMENT: Chronic | ICD-10-CM

## 2021-01-12 DIAGNOSIS — B97.89 OTHER VIRAL AGENTS AS THE CAUSE OF DISEASES CLASSIFIED ELSEWHERE: ICD-10-CM

## 2021-01-12 PROBLEM — Z22.7 LATENT TUBERCULOSIS: Status: ACTIVE | Noted: 2021-01-12

## 2021-01-12 PROCEDURE — G0010: CPT

## 2021-01-12 PROCEDURE — 99203 OFFICE O/P NEW LOW 30 MIN: CPT

## 2021-01-12 PROCEDURE — 90739 HEPB VACC 2/4 DOSE ADULT IM: CPT

## 2021-01-12 PROCEDURE — G0463: CPT | Mod: 25

## 2021-01-13 NOTE — HISTORY OF PRESENT ILLNESS
[FreeTextEntry1] : 56 M ESRD on HD (MWF), HTN, HLD, DM, seizure history, presents today for Latent TB.\par On work-up for renal transplant found to have positive quantiferon gold.\par \par ROS: No fevers, no sweats, no chills, no cough, no hemoptysis, no weight loss, denies swollen glands.\par Born in [place] at 13 years old.\par Lives with sister.\par No known contacts with Tuberculosis.  \par Never treated for tuberculosis.\par \par Quantiferon gold was positive on 12/22/2020\par Chest xray done 12/8/2020 which has no airspace consolidations and no acute cardiopulmonary pathology.\par Been on HD for 2 months now.\par Has AVF from 12/16/2020 - not ready for use yet. \par \par Of note:\par Hep B surface ab negative\par HSV-1: positive. Pt does not get cold sores.\par CMV IgG positive\par Toxoplasma IgG positive

## 2021-01-13 NOTE — PHYSICAL EXAM
[General Appearance - Alert] : alert [General Appearance - In No Acute Distress] : in no acute distress [Sclera] : the sclera and conjunctiva were normal [PERRL With Normal Accommodation] : pupils were equal in size, round, reactive to light [Extraocular Movements] : extraocular movements were intact [Outer Ear] : the ears and nose were normal in appearance [Neck Appearance] : the appearance of the neck was normal [Oropharynx] : the oropharynx was normal with no thrush [Neck Cervical Mass (___cm)] : no neck mass was observed [Jugular Venous Distention Increased] : there was no jugular-venous distention [Thyroid Diffuse Enlargement] : the thyroid was not enlarged [] : no respiratory distress [Auscultation Breath Sounds / Voice Sounds] : lungs were clear to auscultation bilaterally [Heart Rate And Rhythm] : heart rate was normal and rhythm regular [FreeTextEntry1] : +murmur [Bowel Sounds] : normal bowel sounds [Abdomen Soft] : soft [Cervical Lymph Nodes Enlarged Posterior Bilaterally] : posterior cervical [Cervical Lymph Nodes Enlarged Anterior Bilaterally] : anterior cervical [Supraclavicular Lymph Nodes Enlarged Bilaterally] : supraclavicular [Skin Lesions] : no skin lesions [No Focal Deficits] : no focal deficits [Oriented To Time, Place, And Person] : oriented to person, place, and time [Affect] : the affect was normal

## 2021-01-13 NOTE — CONSULT LETTER
[Consult Letter:] : I had the pleasure of evaluating your patient, [unfilled]. [Please see my note below.] : Please see my note below. [Consult Closing:] : Thank you very much for allowing me to participate in the care of this patient.  If you have any questions, please do not hesitate to contact me. [Sincerely,] : Sincerely, [FreeTextEntry2] : Dr. Crocker [FreeTextEntry3] : \par Jessica Alvarez MD\par  of Medicine\par Division of Infectious Diseases\par [facility] [employer] [facility]\par [address] [place] [address] DrGeovani\[place] [address]\par Tel: [phone]\par Fax: [phone]\par  [DrGeovani  ___] : Dr. LAZAR

## 2021-01-13 NOTE — ASSESSMENT
[FreeTextEntry1] : 56 M ESRD on HD (MWF), HTN, HLD, DM, seizure history, presents today for Latent TB.\par On work-up for renal transplant found to have positive quantiferon gold.\par \par Consistent with Latent TB.\par On depakote and efficacy can be lowered from INH. D/w neuro and okay to start INH and monitor depakote levels. Pt has appt 1/28 with neuro as well.\par \par Prefer INH over rifampin b/c rifampin interacts with transplant meds in case he were to get transplant.\par \par Start  mg po daily with Vit B6 50 mg po daily\par Start date 1/13/2021 - end date 10/13/2021.\par \par Side effects: GI upset, neuropathy, rash, hepatitis, avoid alcohol.  All d/w pt. He understands. \par Tearful today about his medical health but encouragement provided to pt.\par Pt is not contagious to others. \par \par Hep B vaccine give today, dose #1. Dose #2 in 1 month. \par \par RTC 1 month\par D/w pt, sister. Called sister to confirm as well. D/w neuro.

## 2021-01-14 ENCOUNTER — APPOINTMENT (OUTPATIENT)
Dept: CARDIOLOGY | Facility: CLINIC | Age: 56
End: 2021-01-14
Payer: MEDICAID

## 2021-01-14 DIAGNOSIS — Z23 ENCOUNTER FOR IMMUNIZATION: ICD-10-CM

## 2021-01-14 DIAGNOSIS — Z22.7 LATENT TUBERCULOSIS: ICD-10-CM

## 2021-01-14 PROCEDURE — 93298 REM INTERROG DEV EVAL SCRMS: CPT

## 2021-01-14 PROCEDURE — G2066: CPT

## 2021-01-19 ENCOUNTER — RESULT REVIEW (OUTPATIENT)
Age: 56
End: 2021-01-19

## 2021-01-19 ENCOUNTER — OUTPATIENT (OUTPATIENT)
Dept: OUTPATIENT SERVICES | Facility: HOSPITAL | Age: 56
LOS: 1 days | End: 2021-01-19
Payer: COMMERCIAL

## 2021-01-19 ENCOUNTER — APPOINTMENT (OUTPATIENT)
Dept: CT IMAGING | Facility: CLINIC | Age: 56
End: 2021-01-19
Payer: COMMERCIAL

## 2021-01-19 DIAGNOSIS — Z01.818 ENCOUNTER FOR OTHER PREPROCEDURAL EXAMINATION: ICD-10-CM

## 2021-01-19 DIAGNOSIS — Z98.890 OTHER SPECIFIED POSTPROCEDURAL STATES: Chronic | ICD-10-CM

## 2021-01-19 DIAGNOSIS — Z92.89 PERSONAL HISTORY OF OTHER MEDICAL TREATMENT: Chronic | ICD-10-CM

## 2021-01-19 PROCEDURE — 71250 CT THORAX DX C-: CPT | Mod: 26

## 2021-01-19 PROCEDURE — 71250 CT THORAX DX C-: CPT

## 2021-01-19 PROCEDURE — 75635 CT ANGIO ABDOMINAL ARTERIES: CPT | Mod: 26

## 2021-01-19 PROCEDURE — 75635 CT ANGIO ABDOMINAL ARTERIES: CPT

## 2021-01-25 ENCOUNTER — NON-APPOINTMENT (OUTPATIENT)
Age: 56
End: 2021-01-25

## 2021-01-26 ENCOUNTER — APPOINTMENT (OUTPATIENT)
Dept: CARDIOLOGY | Facility: CLINIC | Age: 56
End: 2021-01-26
Payer: MEDICAID

## 2021-01-26 PROCEDURE — ZZZZZ: CPT

## 2021-01-27 ENCOUNTER — NON-APPOINTMENT (OUTPATIENT)
Age: 56
End: 2021-01-27

## 2021-01-28 ENCOUNTER — APPOINTMENT (OUTPATIENT)
Dept: CARDIOLOGY | Facility: CLINIC | Age: 56
End: 2021-01-28
Payer: MEDICAID

## 2021-01-28 PROCEDURE — 93015 CV STRESS TEST SUPVJ I&R: CPT

## 2021-01-28 PROCEDURE — 99072 ADDL SUPL MATRL&STAF TM PHE: CPT

## 2021-01-28 PROCEDURE — 78452 HT MUSCLE IMAGE SPECT MULT: CPT

## 2021-01-28 PROCEDURE — A9500: CPT

## 2021-01-28 RX ADMIN — REGADENOSON 0 MG/5ML: 0.08 INJECTION, SOLUTION INTRAVENOUS at 00:00

## 2021-01-29 RX ORDER — REGADENOSON 0.08 MG/ML
0.4 INJECTION, SOLUTION INTRAVENOUS
Qty: 4 | Refills: 0 | Status: COMPLETED | OUTPATIENT
Start: 2021-01-28

## 2021-02-02 ENCOUNTER — APPOINTMENT (OUTPATIENT)
Dept: CARDIOLOGY | Facility: CLINIC | Age: 56
End: 2021-02-02
Payer: MEDICAID

## 2021-02-02 VITALS
HEART RATE: 89 BPM | BODY MASS INDEX: 35.28 KG/M2 | WEIGHT: 252 LBS | HEIGHT: 71 IN | SYSTOLIC BLOOD PRESSURE: 123 MMHG | DIASTOLIC BLOOD PRESSURE: 73 MMHG | TEMPERATURE: 98 F | RESPIRATION RATE: 15 BRPM

## 2021-02-02 PROCEDURE — 99072 ADDL SUPL MATRL&STAF TM PHE: CPT

## 2021-02-02 PROCEDURE — 99214 OFFICE O/P EST MOD 30 MIN: CPT

## 2021-02-02 NOTE — HISTORY OF PRESENT ILLNESS
[FreeTextEntry1] : Patient returns today stating he is having some issues with dialysis, primarily that he is very tired on his dialysis days after finishing.  He continues to use a permacath but does have a fistula now in place.  Hopefully he can use that in the next few weeks.  He reports no other specific physical symptoms at this time.  He denies dizziness or lightheadedness.  Patient denies chest pain, shortness of breath, palpitations, orthopnea, presyncope, syncope.

## 2021-02-02 NOTE — DISCUSSION/SUMMARY
[FreeTextEntry1] : 1. Continue current cardiac meds in doses as noted above for hypertrophic cardiomyopathy and hypertension and dyslipidemia. \par 2. Dialysis per nephrology.  I will also defer to nephrology with regard to his antihypertensive regimen.\par 3. Monitor BP at home, keep a log and bring to f/u.\par 4. Continue monitoring of loop recorder and looking for additional pauses. No indication for pacemaker at this time. Followup with EP.\par 6. Plan PYP scan and cardiac PET evaluate for amyloidosis and sarcoidosis..\par 7. Hold off on sleep study at this time at patient's request but will reconsider at follow-up.\par 8. Follow up with primary doctor for treatment of diabetes.\par 9. Follow up here in 2 months.

## 2021-02-02 NOTE — ASSESSMENT
[FreeTextEntry1] : Echocardiogram June 1, 2020 pressures left ventricle normal size and function with ejection fraction of 60-65%. Moderate concentric LVH noted. Moderate mitral regurgitation is noted which is a bit worse than his prior echo.\par \par Nuclear stress test January 28, 2021 was a pharmacologic study which was tolerated well.  Blood pressure was elevated at baseline with a normal response to infusion.  EKG showed horizontal ST depressions in V6.  Mobitz type I heart block was noted through the exam.  Evaluation of nuclear imaging showed no evidence of ischemia or infarct with ejection fraction of 64%.  Moderate LVH was noted.  Left ventricle is moderately dilated.\par \par 56-year-old man with a past medical history of hypertrophic cardiomyopathy, hypertension, diabetes who presents to me for followup evaluation.  Patient appears to be reasonably stable from a cardiac standpoint.  No evidence of significant worsening heart block or CHF at this time.  Stress testing shows no evidence of ischemia or infarct and a normal ejection fraction.  Patient is awaiting further work-up for causes of his cardiomyopathy and heart block including sarcoidosis and amyloidosis.  Blood pressure is reasonably controlled today and I will continue to defer to renal in this regard as he is on dialysis.  He never had a sleep study done but would like to hold off for now given all the other testing and issues he is going through.

## 2021-02-02 NOTE — PHYSICAL EXAM
[General Appearance - In No Acute Distress] : no acute distress [Normal Conjunctiva] : the conjunctiva exhibited no abnormalities [Normal Oral Mucosa] : normal oral mucosa [Normal Oropharynx] : normal oropharynx [Auscultation Breath Sounds / Voice Sounds] : lungs were clear to auscultation bilaterally [Abdomen Soft] : soft [Abdomen Tenderness] : non-tender [Abnormal Walk] : normal gait [Nail Clubbing] : no clubbing of the fingernails [Cyanosis, Localized] : no localized cyanosis [Skin Color & Pigmentation] : normal skin color and pigmentation [Oriented To Time, Place, And Person] : oriented to person, place, and time [Affect] : the affect was normal [Normal Rate] : normal [Rhythm Regular] : regular [Normal S1] : normal S1 [Normal S2] : normal S2 [S3] : no S3 [S4] : an S4 was heard [II] : a grade 2 [FreeTextEntry1] : 1+ LE edema

## 2021-02-08 ENCOUNTER — LABORATORY RESULT (OUTPATIENT)
Age: 56
End: 2021-02-08

## 2021-02-08 ENCOUNTER — APPOINTMENT (OUTPATIENT)
Dept: NEUROLOGY | Facility: CLINIC | Age: 56
End: 2021-02-08

## 2021-02-08 DIAGNOSIS — Z22.7 LATENT TUBERCULOSIS: ICD-10-CM

## 2021-02-09 ENCOUNTER — OUTPATIENT (OUTPATIENT)
Dept: OUTPATIENT SERVICES | Facility: HOSPITAL | Age: 56
LOS: 1 days | End: 2021-02-09
Payer: MEDICAID

## 2021-02-09 ENCOUNTER — APPOINTMENT (OUTPATIENT)
Dept: INFECTIOUS DISEASE | Facility: CLINIC | Age: 56
End: 2021-02-09
Payer: MEDICAID

## 2021-02-09 ENCOUNTER — APPOINTMENT (OUTPATIENT)
Dept: NEUROLOGY | Facility: CLINIC | Age: 56
End: 2021-02-09
Payer: MEDICAID

## 2021-02-09 ENCOUNTER — APPOINTMENT (OUTPATIENT)
Dept: NEPHROLOGY | Facility: CLINIC | Age: 56
End: 2021-02-09

## 2021-02-09 VITALS
WEIGHT: 246 LBS | SYSTOLIC BLOOD PRESSURE: 142 MMHG | RESPIRATION RATE: 14 BRPM | HEART RATE: 81 BPM | DIASTOLIC BLOOD PRESSURE: 69 MMHG | BODY MASS INDEX: 34.44 KG/M2 | OXYGEN SATURATION: 97 % | HEIGHT: 71 IN | TEMPERATURE: 96.4 F

## 2021-02-09 DIAGNOSIS — Z92.89 PERSONAL HISTORY OF OTHER MEDICAL TREATMENT: Chronic | ICD-10-CM

## 2021-02-09 DIAGNOSIS — Z98.890 OTHER SPECIFIED POSTPROCEDURAL STATES: Chronic | ICD-10-CM

## 2021-02-09 DIAGNOSIS — B97.89 OTHER VIRAL AGENTS AS THE CAUSE OF DISEASES CLASSIFIED ELSEWHERE: ICD-10-CM

## 2021-02-09 PROCEDURE — 99213 OFFICE O/P EST LOW 20 MIN: CPT | Mod: 95

## 2021-02-09 PROCEDURE — G0010: CPT

## 2021-02-09 PROCEDURE — 80053 COMPREHEN METABOLIC PANEL: CPT

## 2021-02-09 PROCEDURE — 85027 COMPLETE CBC AUTOMATED: CPT

## 2021-02-09 PROCEDURE — G0463: CPT | Mod: 25

## 2021-02-09 PROCEDURE — 99213 OFFICE O/P EST LOW 20 MIN: CPT

## 2021-02-09 PROCEDURE — 90739 HEPB VACC 2/4 DOSE ADULT IM: CPT
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
## 2021-02-09 NOTE — ASSESSMENT
[FreeTextEntry1] : This is a 56 year-old man who had an episode suggestive of seizure in June of 2018. \par EEG demonstrated a focal spike formation. This was left anterior temporal.\par \par He is currently on Depakote 500 mg twice per day.\par He has been seizure-free on this dosage for more than one year.\par He had recent blood tests in July of 2020 which included a liver profile which was unremarkable.\par \par I will see him back in 6 months.

## 2021-02-09 NOTE — ASSESSMENT
[FreeTextEntry1] : 56 M ESRD on HD (MWF), HTN, HLD, DM, seizure history, presents today for Latent TB.\par On work-up for renal transplant found to have positive quantiferon gold.\par \par Consistent with Latent TB.\par On depakote and efficacy can be lowered from INH. D/w neuro and okay to start INH and monitor depakote levels. Pt has appt today 2/9/21 with neuro as well.\par \par Prefer INH over rifampin b/c rifampin interacts with transplant meds in case he were to get transplant.\par \par Start  mg po daily with Vit B6 50 mg po daily\par Start date 1/13/2021 - end date 10/13/2021.\par \par Side effects: GI upset, neuropathy, rash, hepatitis, avoid alcohol.  All d/w pt. He understands. \par Pt is not contagious to others. \par \par Hep B vaccine dose #2 today.   \par \par Check CBC w/ diff, CMP today.\par \par RTC 2 months\par \par Time spent 21 minutes.

## 2021-02-09 NOTE — PHYSICAL EXAM
[FreeTextEntry1] : Examination:\par Examination is limited due to the nature of tele health visit. \par \par The patient is well appearing and in no acute distress. \par \par Neurological Examination: \par Mental status: The patient is awake, alert, and oriented. Attention span seems normal. Recent and remote memory seem intact. \par Cranial nerves: Extraocular motion is full. Face appears symmetric, Shoulder shrug is symmetric, tongue is midline. \par Motor: There is no pronator drift. Fine finger movement is intact. There is no tremor.\par Sensory: Unable to test.\par Reflexes: Unable to test. \par Cerebellar: No finger nose dysmetria.\par \par Gait is grossly normal (within the confines of the tele health technology). \par \par There is some edema seen in the feet. \par

## 2021-02-09 NOTE — DATA REVIEWED
[de-identified] : EEG was performed in the hospital on 6/18/18.\par The study demonstrated some focal slowing over the left anterior temporal region. There was also spike and wave activity in the left frontotemporal region.\par  [de-identified] : Brain MRI was performed on 6/18/18 at [facility].\par There were some scattered foci of nonspecific gliosis which were unchanged from a prior study in December of 2017.\par

## 2021-02-09 NOTE — HISTORY OF PRESENT ILLNESS
[FreeTextEntry1] : 56 M ESRD on HD (MWF), HTN, HLD, DM, seizure history, presents today for Latent TB.\par On work-up for renal transplant found to have positive quantiferon gold.\par \par ROS: No fevers, no sweats, no chills, no cough, no hemoptysis, no weight loss, denies swollen glands.\par Born [place]oved [place] at 13 years old.\par Lives with sister.\par No known contacts with Tuberculosis.  \par Never treated for tuberculosis.\par \par Quantiferon gold was positive on 12/22/2020\par Chest xray done 12/8/2020 which has no airspace consolidations and no acute cardiopulmonary pathology.\par Been on HD for 2 months now.\par Has AVF from 12/16/2020 - not ready for use yet. \par \par Of note:\par Hep B surface ab negative\par HSV-1: positive. Pt does not get cold sores.\par CMV IgG positive\par Toxoplasma IgG positive\par \par Started INH/B6 last visit.  To complete 9 months. No side effects noted.\par Received Hep B vaccine in my office #1, also received a dose of different Hep B vaccine at dialysis. Will complete my series today with dose #2. No need to get at dialysis center.  Will check Hep surface ab in future.

## 2021-02-09 NOTE — CONSULT LETTER
[Consult Letter:] : I had the pleasure of evaluating your patient, [unfilled]. [Please see my note below.] : Please see my note below. [Consult Closing:] : Thank you very much for allowing me to participate in the care of this patient.  If you have any questions, please do not hesitate to contact me. [Sincerely,] : Sincerely, [FreeTextEntry2] : Dr. Crocker [FreeTextEntry3] : \par Jessica Alvarez MD\par  of Medicine\par Division of Infectious Diseases\par [facility]\par [address] [place] [address] DrGeovani\[place] [address]\par Tel: [phone]\par Fax: [phone]\par  [DrGeovani  ___] : Dr. LAZAR

## 2021-02-10 LAB
ALBUMIN SERPL ELPH-MCNC: 3.2 G/DL — LOW (ref 3.3–5)
ALP SERPL-CCNC: 85 U/L — SIGNIFICANT CHANGE UP (ref 40–120)
ALT FLD-CCNC: 7 U/L — LOW (ref 10–45)
ANION GAP SERPL CALC-SCNC: 15 MMOL/L — SIGNIFICANT CHANGE UP (ref 5–17)
AST SERPL-CCNC: 25 U/L — SIGNIFICANT CHANGE UP (ref 10–40)
BILIRUB SERPL-MCNC: 0.6 MG/DL — SIGNIFICANT CHANGE UP (ref 0.2–1.2)
BUN SERPL-MCNC: 24 MG/DL — HIGH (ref 7–23)
CALCIUM SERPL-MCNC: 8.3 MG/DL — LOW (ref 8.4–10.5)
CHLORIDE SERPL-SCNC: 99 MMOL/L — SIGNIFICANT CHANGE UP (ref 96–108)
CO2 SERPL-SCNC: 25 MMOL/L — SIGNIFICANT CHANGE UP (ref 22–31)
CREAT SERPL-MCNC: 3.76 MG/DL — HIGH (ref 0.5–1.3)
GLUCOSE SERPL-MCNC: 231 MG/DL — HIGH (ref 70–99)
HCT VFR BLD CALC: 27.7 % — LOW (ref 39–50)
HGB BLD-MCNC: 9.3 G/DL — LOW (ref 13–17)
MCHC RBC-ENTMCNC: 29.8 PG — SIGNIFICANT CHANGE UP (ref 27–34)
MCHC RBC-ENTMCNC: 33.6 GM/DL — SIGNIFICANT CHANGE UP (ref 32–36)
MCV RBC AUTO: 88.8 FL — SIGNIFICANT CHANGE UP (ref 80–100)
PLATELET # BLD AUTO: 199 K/UL — SIGNIFICANT CHANGE UP (ref 150–400)
POTASSIUM SERPL-MCNC: 4.2 MMOL/L — SIGNIFICANT CHANGE UP (ref 3.5–5.3)
POTASSIUM SERPL-SCNC: 4.2 MMOL/L — SIGNIFICANT CHANGE UP (ref 3.5–5.3)
PROT SERPL-MCNC: 6 G/DL — SIGNIFICANT CHANGE UP (ref 6–8.3)
RBC # BLD: 3.12 M/UL — LOW (ref 4.2–5.8)
RBC # FLD: 14.3 % — SIGNIFICANT CHANGE UP (ref 10.3–14.5)
SODIUM SERPL-SCNC: 139 MMOL/L — SIGNIFICANT CHANGE UP (ref 135–145)
WBC # BLD: 5.7 K/UL — SIGNIFICANT CHANGE UP (ref 3.8–10.5)
WBC # FLD AUTO: 5.7 K/UL — SIGNIFICANT CHANGE UP (ref 3.8–10.5)

## 2021-02-11 ENCOUNTER — APPOINTMENT (OUTPATIENT)
Dept: CARDIOLOGY | Facility: CLINIC | Age: 56
End: 2021-02-11
Payer: COMMERCIAL

## 2021-02-11 VITALS
HEIGHT: 71 IN | SYSTOLIC BLOOD PRESSURE: 116 MMHG | OXYGEN SATURATION: 99 % | WEIGHT: 240 LBS | DIASTOLIC BLOOD PRESSURE: 73 MMHG | HEART RATE: 75 BPM | BODY MASS INDEX: 33.6 KG/M2 | RESPIRATION RATE: 16 BRPM

## 2021-02-11 DIAGNOSIS — Z23 ENCOUNTER FOR IMMUNIZATION: ICD-10-CM

## 2021-02-11 DIAGNOSIS — N18.9 CHRONIC KIDNEY DISEASE, UNSPECIFIED: ICD-10-CM

## 2021-02-11 DIAGNOSIS — Z22.7 LATENT TUBERCULOSIS: ICD-10-CM

## 2021-02-11 PROCEDURE — 99204 OFFICE O/P NEW MOD 45 MIN: CPT

## 2021-02-11 PROCEDURE — 93000 ELECTROCARDIOGRAM COMPLETE: CPT

## 2021-02-11 PROCEDURE — 99072 ADDL SUPL MATRL&STAF TM PHE: CPT

## 2021-02-16 ENCOUNTER — NON-APPOINTMENT (OUTPATIENT)
Age: 56
End: 2021-02-16

## 2021-02-16 NOTE — DISCUSSION/SUMMARY
[Hypertension] : hypertension [Stable] : stable [FreeTextEntry1] : \par Currently stable from a cardiovascular standpoint. Normotensive. Currently in mild volume overload. Asymptomatic. History of varying degrees of AV block. Continue current medications. ECG completed today and reviewed. Prior cardiac testing records reviewed. At this time, patient is considered an acceptable risk from a cardiac standpoint for renal transplant. Regular follow up with primary cardiologist is advised.

## 2021-02-16 NOTE — PHYSICAL EXAM
[General Appearance - Well Developed] : well developed [Normal Appearance] : normal appearance [Well Groomed] : well groomed [General Appearance - Well Nourished] : well nourished [No Deformities] : no deformities [General Appearance - In No Acute Distress] : no acute distress [Normal Conjunctiva] : the conjunctiva exhibited no abnormalities [Eyelids - No Xanthelasma] : the eyelids demonstrated no xanthelasmas [Normal Oral Mucosa] : normal oral mucosa [No Oral Pallor] : no oral pallor [No Oral Cyanosis] : no oral cyanosis [Heart Rate And Rhythm] : heart rate and rhythm were normal [Heart Sounds] : normal S1 and S2 [Murmurs] : no murmurs present [Respiration, Rhythm And Depth] : normal respiratory rhythm and effort [Exaggerated Use Of Accessory Muscles For Inspiration] : no accessory muscle use [Auscultation Breath Sounds / Voice Sounds] : lungs were clear to auscultation bilaterally [Abdomen Soft] : soft [Abdomen Tenderness] : non-tender [Abnormal Walk] : normal gait [Cyanosis, Localized] : no localized cyanosis [Skin Color & Pigmentation] : normal skin color and pigmentation [] : no rash [Oriented To Time, Place, And Person] : oriented to person, place, and time [No Anxiety] : not feeling anxious [FreeTextEntry1] : right chest dialysis catheter

## 2021-02-19 ENCOUNTER — APPOINTMENT (OUTPATIENT)
Dept: CARDIOLOGY | Facility: CLINIC | Age: 56
End: 2021-02-19
Payer: MEDICAID

## 2021-02-19 PROCEDURE — G2066: CPT

## 2021-02-19 PROCEDURE — 93298 REM INTERROG DEV EVAL SCRMS: CPT

## 2021-02-25 ENCOUNTER — OUTPATIENT (OUTPATIENT)
Dept: OUTPATIENT SERVICES | Facility: HOSPITAL | Age: 56
LOS: 1 days | End: 2021-02-25
Payer: MEDICAID

## 2021-02-25 VITALS
OXYGEN SATURATION: 98 % | TEMPERATURE: 99 F | RESPIRATION RATE: 14 BRPM | HEIGHT: 71 IN | SYSTOLIC BLOOD PRESSURE: 142 MMHG | HEART RATE: 73 BPM | WEIGHT: 240.08 LBS | DIASTOLIC BLOOD PRESSURE: 82 MMHG

## 2021-02-25 DIAGNOSIS — Z98.890 OTHER SPECIFIED POSTPROCEDURAL STATES: Chronic | ICD-10-CM

## 2021-02-25 DIAGNOSIS — G47.33 OBSTRUCTIVE SLEEP APNEA (ADULT) (PEDIATRIC): ICD-10-CM

## 2021-02-25 DIAGNOSIS — Z01.818 ENCOUNTER FOR OTHER PREPROCEDURAL EXAMINATION: ICD-10-CM

## 2021-02-25 DIAGNOSIS — E11.9 TYPE 2 DIABETES MELLITUS WITHOUT COMPLICATIONS: ICD-10-CM

## 2021-02-25 DIAGNOSIS — N18.6 END STAGE RENAL DISEASE: ICD-10-CM

## 2021-02-25 DIAGNOSIS — Z92.89 PERSONAL HISTORY OF OTHER MEDICAL TREATMENT: Chronic | ICD-10-CM

## 2021-02-25 LAB
HCT VFR BLD CALC: 29.8 % — LOW (ref 39–50)
HGB BLD-MCNC: 10.2 G/DL — LOW (ref 13–17)
MCHC RBC-ENTMCNC: 29.1 PG — SIGNIFICANT CHANGE UP (ref 27–34)
MCHC RBC-ENTMCNC: 34.2 GM/DL — SIGNIFICANT CHANGE UP (ref 32–36)
MCV RBC AUTO: 85.1 FL — SIGNIFICANT CHANGE UP (ref 80–100)
NRBC # BLD: 0 /100 WBCS — SIGNIFICANT CHANGE UP (ref 0–0)
PLATELET # BLD AUTO: 228 K/UL — SIGNIFICANT CHANGE UP (ref 150–400)
RBC # BLD: 3.5 M/UL — LOW (ref 4.2–5.8)
RBC # FLD: 13.2 % — SIGNIFICANT CHANGE UP (ref 10.3–14.5)
WBC # BLD: 6.28 K/UL — SIGNIFICANT CHANGE UP (ref 3.8–10.5)
WBC # FLD AUTO: 6.28 K/UL — SIGNIFICANT CHANGE UP (ref 3.8–10.5)

## 2021-02-25 PROCEDURE — 85027 COMPLETE CBC AUTOMATED: CPT

## 2021-02-25 PROCEDURE — G0463: CPT

## 2021-02-25 RX ORDER — INSULIN GLARGINE 100 [IU]/ML
44 INJECTION, SOLUTION SUBCUTANEOUS
Qty: 0 | Refills: 0 | DISCHARGE

## 2021-02-25 NOTE — H&P PST ADULT - OTHER CARE PROVIDERS
Ed PCP, Chelsea (cardiology), Cammy (nephrology), Marianne (nephrology) Ed PCP, Chelsea -neurology, Pantera Sandoval (cardiology), Cammy (nephrology), Marianne (nephrology)

## 2021-02-25 NOTE — H&P PST ADULT - NSICDXPASTMEDICALHX_GEN_ALL_CORE_FT
PAST MEDICAL HISTORY:  1st degree AV block     Bilateral lower extremity edema     Chronic renal disease     Diabetes     End stage renal disease     ESRD on hemodialysis right ACW tunneled catheter, HD M/W/F    History of loop recorder implented 12/7/2018 Medtronic model LNQ11 secondary to vertigo/syncope/seizure episode    History of seizure disorder     HTN (hypertension)     Mitral regurgitation      PAST MEDICAL HISTORY:  1st degree AV block     Bilateral lower extremity edema     Chronic renal disease     Diabetes type 2    End stage renal disease     ESRD on hemodialysis right ACW tunneled catheter, HD M/W/F    History of loop recorder implented 12/7/2018 Medtronic model LNQ11 secondary to vertigo/syncope/seizure episode    History of seizure disorder     HTN (hypertension)     Mitral regurgitation     TB lung, latent on INH 1/13/2021-10/13/2021

## 2021-02-25 NOTE — H&P PST ADULT - HISTORY OF PRESENT ILLNESS
56 yo M PMH of hypertrophic cardiomyopathy, Left ventricular outflow tract obstruction, first-degree heart block, lower extremity edema, DM 2 (dx 2 years ago), HTN, seizure disorder, HLD, GERD, Medtronic ILR implanted 12/7/2018, Echo 6/1/2020 showed LV normal size and function, EF 60-65%, moderate concentric LVH noted, moderate mitral regurgitation was noted that was a bit worse than his prior echo. Pt reports his lower extremity swelling has improved. Recent LE venous doppler showed no evidence of DVT. Pt states that he does not exercise. Today pt denies fevers, chills, chest pain, SOB, dizziness, lightheadedness, palpitations, syncope, or any bowel or bladder issues. Recent renal biopsy showed 40% IFTA; severe arteriosclerosis; KW nodules; consistent w/diabetic nephropathy. Pt said he has a Hx of hospitalizations due to diabetes, was recently hospitalized at [facility] and started on HD; tunneled catheter right ACW, HD 3x/week M/W/F in [place]. Pt c/o fatigue after HD. Denies FHx of kidney disease. Presents for preop assessment prior to Left Upper Extremity Fistula creation, possible graft w/Dr Celina Narvaez on 12/17      Specific Issues:   Assessment   Mr. SARAH MORFIN is a 55 year old man evaluated for kidney transplantation. All his medical problems were diagnosed in 2017 due to poor prior medical follow up.     The etiology of his ESRD is biopsy proven diabetic nephropathy. He has been on dialysis since 11/2020.     Diabetes type II is complicated by retinopathy and neuropathy in addition to nephropathy. He had poor prior medical follow up in the past but is now regularly followed by PMD, podiatry and opthalmology. Most recent Hb A1c 6.8%.     Hypertension was previously on 4 medications, now off all medications due to intradialytic hypotension. BP fairly well controlled.     Seizure disorder dx in 6/2018 - patient only had a single episode of seizure, remains on Depakote, no recurrent seizure. Followed by neurology.     He has history of hypertrophic cardiomyopathy with LVOT followed by cardiologist. Most recent echo in 8/2020 without mention of outflow obstruction, has normal EF and no significant valvular disease.   He has no history of ischemic heart disease, cath in 2017 showed only 20% LAD lesion.     He has no known PAD and no history of foot ulcers or claudications but has diminished right pedal pulse suggestive of PAD.     His BMI is 34, his functional status is marginal (walks 1 block, climbs a fight of stairs slowly) but improving since initiation of dialysis.     Mr. Morfin has multiple comorbidities described above but he is young and his symptoms and functional status are improving since initiation of dialysis a month ago. There is no evidence of an active medical or psychiatric illness, malignancy or infection that would preclude kidney transplantation. His sister is a potential donor. He is a good candidate for transplantation.        57 yo M PMH of hypertrophic cardiomyopathy, Left ventricular outflow tract obstruction, first-degree heart block, lower extremity edema, DM 2 (A1C=7.2), HTN, seizure disorder (last seizure in 2018, see neuro note), HLD, GERD, Medtronic ILR implanted 12/7/2018, started on HD; tunneled catheter right ACW, HD 3x/week M/W/F via right ACW permacath, s/p RUE AV fistula creation in 12/2020, waiting for maturity.  also PMH latent TB (see ID note on chart), currently on INH treatment, latest CXR in [facility].    pt currently    Pt c/o fatigue after HD. Denies FHx of kidney disease. Presents for preop assessment prior to Left Upper Extremity Fistula creation, possible graft w/Dr Celina Narvaez on 12/17      Specific Issues:   Assessment   Mr. SARAH MORFIN is a 55 year old man evaluated for kidney transplantation. All his medical problems were diagnosed in 2017 due to poor prior medical follow up.     The etiology of his ESRD is biopsy proven diabetic nephropathy. He has been on dialysis since 11/2020.     Diabetes type II is complicated by retinopathy and neuropathy in addition to nephropathy. He had poor prior medical follow up in the past but is now regularly followed by PMD, podiatry and opthalmology. Most recent Hb A1c 6.8%.     Hypertension was previously on 4 medications, now off all medications due to intradialytic hypotension. BP fairly well controlled.     Seizure disorder dx in 6/2018 - patient only had a single episode of seizure, remains on Depakote, no recurrent seizure. Followed by neurology.     He has history of hypertrophic cardiomyopathy with LVOT followed by cardiologist. Most recent echo in 8/2020 without mention of outflow obstruction, has normal EF and no significant valvular disease.   He has no history of ischemic heart disease, cath in 2017 showed only 20% LAD lesion.     He has no known PAD and no history of foot ulcers or claudications but has diminished right pedal pulse suggestive of PAD.     His BMI is 34, his functional status is marginal (walks 1 block, climbs a fight of stairs slowly) but improving since initiation of dialysis.     Mr. Morfin has multiple comorbidities described above but he is young and his symptoms and functional status are improving since initiation of dialysis a month ago. There is no evidence of an active medical or psychiatric illness, malignancy or infection that would preclude kidney transplantation. His sister is a potential donor. He is a good candidate for transplantation.         56 yo M PMH of hypertrophic cardiomyopathy, Left ventricular outflow tract obstruction, first-degree heart block, lower extremity edema, DM 2 (dx 2 years ago), HTN, seizure disorder, HLD, GERD, Medtronic ILR implanted 12/7/2018, Echo 6/1/2020 showed LV normal size and function, EF 60-65%, moderate concentric LVH noted, moderate mitral regurgitation was noted that was a bit worse than his prior echo. Pt reports his lower extremity swelling has improved. Recent LE venous doppler showed no evidence of DVT. Pt states that he does not exercise. Today pt denies fevers, chills, chest pain, SOB, dizziness, lightheadedness, palpitations, syncope, or any bowel or bladder issues. Recent renal biopsy showed 40% IFTA; severe arteriosclerosis; KW nodules; consistent w/diabetic nephropathy. Pt said he has a Hx of hospitalizations due to diabetes, was recently hospitalized at [facility] and started on HD; tunneled catheter right ACW, HD 3x/week M/W/F in [place]. Pt c/o fatigue after HD. Denies FHx of kidney disease. Presents for preop assessment prior to Left Upper Extremity Fistula creation, possible graft w/Dr Celina Narvaez on 12/17      Specific Issues:   Assessment   Mr. SARAH MORFIN is a 55 year old man evaluated for kidney transplantation. All his medical problems were diagnosed in 2017 due to poor prior medical follow up.     The etiology of his ESRD is biopsy proven diabetic nephropathy. He has been on dialysis since 11/2020.     Diabetes type II is complicated by retinopathy and neuropathy in addition to nephropathy. He had poor prior medical follow up in the past but is now regularly followed by PMD, podiatry and opthalmology. Most recent Hb A1c 6.8%.     Hypertension was previously on 4 medications, now off all medications due to intradialytic hypotension. BP fairly well controlled.     Seizure disorder dx in 6/2018 - patient only had a single episode of seizure, remains on Depakote, no recurrent seizure. Followed by neurology.     He has history of hypertrophic cardiomyopathy with LVOT followed by cardiologist. Most recent echo in 8/2020 without mention of outflow obstruction, has normal EF and no significant valvular disease.   He has no history of ischemic heart disease, cath in 2017 showed only 20% LAD lesion.     He has no known PAD and no history of foot ulcers or claudications but has diminished right pedal pulse suggestive of PAD.     His BMI is 34, his functional status is marginal (walks 1 block, climbs a fight of stairs slowly) but improving since initiation of dialysis.     Mr. Morfin has multiple comorbidities described above but he is young and his symptoms and functional status are improving since initiation of dialysis a month ago. There is no evidence of an active medical or psychiatric illness, malignancy or infection that would preclude kidney transplantation. His sister is a potential donor. He is a good candidate for transplantation.        55 yo M PMH of hypertrophic cardiomyopathy (see cardio note enclosed, echo EF=60-65% from Aug 2020), first-degree heart block, DM 2 (A1C=7.2), HTN, seizure disorder (last seizure in 2018, see neuro note), HLD, GERD, Medtronic ILR implanted 12/7/2018 secondary to syncope/vertigo/seizure episode, pt has been follow up by cardiologist since, diabetic nephropathy, ESRD, HD 3x/week M/W/F via right ACW permacath, s/p RUE AV fistula creation in 12/2020, waiting for maturity.  also PMH latent TB (see ID note on chart), currently on INH treatment, latest CXR in [facility].  pt currently being worked up for kidney transplant.  presents to PST scheduled for colonoscopy as part of work up.  covid test scheduled on 2/27 at [facility].

## 2021-02-25 NOTE — H&P PST ADULT - NSICDXPROBLEM_GEN_ALL_CORE_FT
PROBLEM DIAGNOSES  Problem: Encounter for preoperative examination for general surgical procedure  Assessment and Plan: colonoscopy anesthesia      Problem: ESRD on dialysis  Assessment and Plan: check stat potassium on DOS    Problem: Type 2 diabetes mellitus  Assessment and Plan: take 8 units lantus night before colonoscopy   hold novolog on DOS  check finger stick DOS    Problem: YOSVANY (obstructive sleep apnea)  Assessment and Plan: possbile YOSVANY by criteria  YOSVANY precautions  endo booking notified

## 2021-02-25 NOTE — H&P PST ADULT - NSICDXPASTSURGICALHX_GEN_ALL_CORE_FT
PAST SURGICAL HISTORY:  History of hemodialysis tumnneled HD catheter right ACW    History of loop recorder 2018    S/P arteriovenous (AV) fistula creation 12/16/2020 APRIL

## 2021-02-25 NOTE — H&P PST ADULT - MALLAMPATI CLASS
tongue deviates to left/Class IV (difficult) - the soft palate is not visible at all Class III - visualization of the soft palate and the base of the uvula

## 2021-02-26 DIAGNOSIS — Z01.818 ENCOUNTER FOR OTHER PREPROCEDURAL EXAMINATION: ICD-10-CM

## 2021-02-27 ENCOUNTER — APPOINTMENT (OUTPATIENT)
Dept: DISASTER EMERGENCY | Facility: CLINIC | Age: 56
End: 2021-02-27

## 2021-02-28 LAB — SARS-COV-2 N GENE NPH QL NAA+PROBE: NOT DETECTED

## 2021-03-01 ENCOUNTER — APPOINTMENT (OUTPATIENT)
Dept: CARDIOLOGY | Facility: CLINIC | Age: 56
End: 2021-03-01

## 2021-03-02 ENCOUNTER — APPOINTMENT (OUTPATIENT)
Dept: HEPATOLOGY | Facility: HOSPITAL | Age: 56
End: 2021-03-02

## 2021-03-02 ENCOUNTER — OUTPATIENT (OUTPATIENT)
Dept: OUTPATIENT SERVICES | Facility: HOSPITAL | Age: 56
LOS: 1 days | End: 2021-03-02
Payer: MEDICAID

## 2021-03-02 VITALS
HEIGHT: 71 IN | TEMPERATURE: 98 F | HEART RATE: 72 BPM | RESPIRATION RATE: 16 BRPM | SYSTOLIC BLOOD PRESSURE: 187 MMHG | OXYGEN SATURATION: 98 % | DIASTOLIC BLOOD PRESSURE: 75 MMHG | WEIGHT: 240.08 LBS

## 2021-03-02 VITALS
RESPIRATION RATE: 16 BRPM | OXYGEN SATURATION: 97 % | HEART RATE: 65 BPM | SYSTOLIC BLOOD PRESSURE: 151 MMHG | DIASTOLIC BLOOD PRESSURE: 76 MMHG

## 2021-03-02 DIAGNOSIS — Z98.890 OTHER SPECIFIED POSTPROCEDURAL STATES: Chronic | ICD-10-CM

## 2021-03-02 DIAGNOSIS — Z92.89 PERSONAL HISTORY OF OTHER MEDICAL TREATMENT: Chronic | ICD-10-CM

## 2021-03-02 DIAGNOSIS — Z01.818 ENCOUNTER FOR OTHER PREPROCEDURAL EXAMINATION: ICD-10-CM

## 2021-03-02 LAB — GLUCOSE BLDC GLUCOMTR-MCNC: 166 MG/DL — HIGH (ref 70–99)

## 2021-03-02 PROCEDURE — 45378 DIAGNOSTIC COLONOSCOPY: CPT | Mod: GC

## 2021-03-02 PROCEDURE — G0121: CPT

## 2021-03-02 PROCEDURE — 82962 GLUCOSE BLOOD TEST: CPT

## 2021-03-02 RX ORDER — SODIUM CHLORIDE 9 MG/ML
500 INJECTION INTRAMUSCULAR; INTRAVENOUS; SUBCUTANEOUS
Refills: 0 | Status: DISCONTINUED | OUTPATIENT
Start: 2021-03-02 | End: 2021-03-16

## 2021-03-02 NOTE — ASU PATIENT PROFILE, ADULT - PMH
1st degree AV block    Bilateral lower extremity edema    Chronic renal disease    Diabetes  type 2  End stage renal disease    ESRD on hemodialysis  right ACW tunneled catheter, HD M/W/F  History of loop recorder  implented 12/7/2018 Medtronic model LNQ11 secondary to vertigo/syncope/seizure episode  History of seizure disorder    HTN (hypertension)    Mitral regurgitation    TB lung, latent  on INH 1/13/2021-10/13/2021

## 2021-03-02 NOTE — ASU PATIENT PROFILE, ADULT - PSH
History of hemodialysis  tumnneled HD catheter right ACW  History of loop recorder  2018  S/P arteriovenous (AV) fistula creation  12/16/2020 APRIL

## 2021-03-02 NOTE — PRE PROCEDURE NOTE - PRE PROCEDURE EVALUATION
Attending Physician:  Estefania                      Procedure:    Indication for Procedure:  ________________________________________________________  PAST MEDICAL & SURGICAL HISTORY:  TB lung, latent  on INH 1/13/2021-10/13/2021    History of seizure disorder    History of loop recorder  implented 12/7/2018 Medtronic model LNQ11 secondary to vertigo/syncope/seizure episode    ESRD on hemodialysis  right ACW tunneled catheter, HD M/W/F    Mitral regurgitation    End stage renal disease    Chronic renal disease    Bilateral lower extremity edema    1st degree AV block    HTN (hypertension)    Diabetes  type 2    S/P arteriovenous (AV) fistula creation  12/16/2020 RUE    History of hemodialysis  tumnneled HD catheter right ACW    History of loop recorder  2018      ALLERGIES:  No Known Allergies    HOME MEDICATIONS:  amLODIPine 5 mg oral tablet: 1 tab(s) orally once a day  aspirin 81 mg oral tablet: 1 tab(s) orally once a day  atorvastatin 40 mg oral tablet: 1 tab(s) orally once a day  calcium acetate 667 mg oral tablet: orally 2 times a day  divalproex sodium 500 mg oral delayed release tablet: 1 tab(s) orally 2 times a day  doxazosin 4 mg oral tablet, extended release: 1 tab(s) orally once a day (in the morning)  HumaLOG 100 units/mL injectable solution: sliding scale  isoniazid 300 mg oral tablet: 1 tab(s) orally once a day (started on 1/13/2021, will finish on 10/13/2021)  Lantus 100 units/mL subcutaneous solution: 10 unit(s) subcutaneous once a day (at bedtime)  Vitamin B6 50 mg oral tablet: 1 tab(s) orally once a day (as part of INH regimen to be given 10 months (Jan to Oct 13, 2021)    AICD/PPM: [ ] yes   [ ] no    PERTINENT LAB DATA:                      PHYSICAL EXAMINATION:    Height (cm): 180.3  Weight (kg): 108.9  BMI (kg/m2): 33.5  BSA (m2): 2.28T(C): 36.5  HR: 72  BP: 187/75  RR: 16  SpO2: 98%    Constitutional: NAD  HEENT: PERRLA, EOMI,    Neck:  No JVD  Respiratory: CTAB/L  Cardiovascular: S1 and S2  Gastrointestinal: BS+, soft, NT/ND  Extremities: No peripheral edema  Neurological: A/O x 3, no focal deficits  Psychiatric: Normal mood, normal affect  Skin: No rashes    ASA Class: I [ ]  II [X ]  III [ ]  IV [ ]    COMMENTS:    The patient is a suitable candidate for the planned procedure unless box checked [ ]  No, explain:

## 2021-03-03 RX ORDER — KIT FOR THE PREPARATION OF TECHNETIUM TC99M SESTAMIBI 1 MG/5ML
INJECTION, POWDER, LYOPHILIZED, FOR SOLUTION PARENTERAL
Refills: 0 | Status: COMPLETED | OUTPATIENT
Start: 2021-03-03

## 2021-03-03 RX ADMIN — KIT FOR THE PREPARATION OF TECHNETIUM TC99M SESTAMIBI 0: 1 INJECTION, POWDER, LYOPHILIZED, FOR SOLUTION PARENTERAL at 00:00

## 2021-03-05 ENCOUNTER — NON-APPOINTMENT (OUTPATIENT)
Age: 56
End: 2021-03-05

## 2021-03-10 PROBLEM — E11.9 TYPE 2 DIABETES MELLITUS WITHOUT COMPLICATIONS: Chronic | Status: ACTIVE | Noted: 2020-09-09

## 2021-03-10 PROBLEM — Z98.890 OTHER SPECIFIED POSTPROCEDURAL STATES: Chronic | Status: ACTIVE | Noted: 2020-12-08

## 2021-03-10 PROBLEM — Z22.7 LATENT TUBERCULOSIS: Chronic | Status: ACTIVE | Noted: 2021-02-25

## 2021-03-23 ENCOUNTER — APPOINTMENT (OUTPATIENT)
Dept: NUCLEAR MEDICINE | Facility: IMAGING CENTER | Age: 56
End: 2021-03-23
Payer: MEDICAID

## 2021-03-23 ENCOUNTER — OUTPATIENT (OUTPATIENT)
Dept: OUTPATIENT SERVICES | Facility: HOSPITAL | Age: 56
LOS: 1 days | End: 2021-03-23
Payer: MEDICAID

## 2021-03-23 ENCOUNTER — RESULT REVIEW (OUTPATIENT)
Age: 56
End: 2021-03-23

## 2021-03-23 DIAGNOSIS — Z98.890 OTHER SPECIFIED POSTPROCEDURAL STATES: Chronic | ICD-10-CM

## 2021-03-23 DIAGNOSIS — I44.1 ATRIOVENTRICULAR BLOCK, SECOND DEGREE: ICD-10-CM

## 2021-03-23 DIAGNOSIS — I42.2 OTHER HYPERTROPHIC CARDIOMYOPATHY: ICD-10-CM

## 2021-03-23 DIAGNOSIS — Z92.89 PERSONAL HISTORY OF OTHER MEDICAL TREATMENT: Chronic | ICD-10-CM

## 2021-03-23 PROCEDURE — 78815 PET IMAGE W/CT SKULL-THIGH: CPT | Mod: 26

## 2021-03-23 PROCEDURE — 78451 HT MUSCLE IMAGE SPECT SING: CPT | Mod: 26

## 2021-03-23 PROCEDURE — 78451 HT MUSCLE IMAGE SPECT SING: CPT

## 2021-03-23 PROCEDURE — 78815 PET IMAGE W/CT SKULL-THIGH: CPT

## 2021-03-23 PROCEDURE — A9552: CPT

## 2021-03-23 PROCEDURE — A9500: CPT

## 2021-03-26 ENCOUNTER — APPOINTMENT (OUTPATIENT)
Dept: CARDIOLOGY | Facility: CLINIC | Age: 56
End: 2021-03-26
Payer: MEDICAID

## 2021-03-26 PROCEDURE — G2066: CPT

## 2021-03-26 PROCEDURE — 93298 REM INTERROG DEV EVAL SCRMS: CPT

## 2021-04-09 ENCOUNTER — APPOINTMENT (OUTPATIENT)
Dept: TRANSPLANT | Facility: CLINIC | Age: 56
End: 2021-04-09
Payer: MEDICAID

## 2021-04-09 PROCEDURE — 99001T: CUSTOM

## 2021-04-22 ENCOUNTER — APPOINTMENT (OUTPATIENT)
Dept: NEUROLOGY | Facility: CLINIC | Age: 56
End: 2021-04-22
Payer: MEDICAID

## 2021-04-22 VITALS — HEIGHT: 71 IN | WEIGHT: 240 LBS | BODY MASS INDEX: 33.6 KG/M2 | TEMPERATURE: 97.8 F

## 2021-04-22 PROCEDURE — 99072 ADDL SUPL MATRL&STAF TM PHE: CPT

## 2021-04-22 PROCEDURE — 99213 OFFICE O/P EST LOW 20 MIN: CPT

## 2021-04-22 NOTE — ASSESSMENT
[FreeTextEntry1] : This is a 56 year-old man who had an episode suggestive of seizure in June of 2018. \par EEG demonstrated a focal spike formation. This was left anterior temporal.\par \par He is currently on Depakote 500 mg twice per day.\par He has been seizure-free on this dosage for more than one year.\par He had recent blood tests in February of 2021 which included a liver profile which was unremarkable.\par \par I will see him back in 6 months.
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
## 2021-04-22 NOTE — DATA REVIEWED
[de-identified] : Brain MRI was performed on 6/18/18 at [facility].\par There were some scattered foci of nonspecific gliosis which were unchanged from a prior study in December of 2017.\par  [de-identified] : EEG was performed in the hospital on 6/18/18.\par The study demonstrated some focal slowing over the left anterior temporal region. There was also spike and wave activity in the left frontotemporal region.\par

## 2021-04-27 ENCOUNTER — APPOINTMENT (OUTPATIENT)
Dept: CARDIOLOGY | Facility: CLINIC | Age: 56
End: 2021-04-27
Payer: MEDICAID

## 2021-04-27 ENCOUNTER — NON-APPOINTMENT (OUTPATIENT)
Age: 56
End: 2021-04-27

## 2021-04-27 VITALS
SYSTOLIC BLOOD PRESSURE: 163 MMHG | HEIGHT: 71 IN | TEMPERATURE: 98.2 F | BODY MASS INDEX: 34.44 KG/M2 | HEART RATE: 80 BPM | DIASTOLIC BLOOD PRESSURE: 77 MMHG | OXYGEN SATURATION: 98 % | WEIGHT: 246 LBS | RESPIRATION RATE: 16 BRPM

## 2021-04-27 PROCEDURE — 93000 ELECTROCARDIOGRAM COMPLETE: CPT

## 2021-04-27 PROCEDURE — 99214 OFFICE O/P EST MOD 30 MIN: CPT

## 2021-04-27 PROCEDURE — 99072 ADDL SUPL MATRL&STAF TM PHE: CPT

## 2021-04-27 NOTE — PHYSICAL EXAM
[General Appearance - In No Acute Distress] : no acute distress [Normal Conjunctiva] : the conjunctiva exhibited no abnormalities [Normal Oral Mucosa] : normal oral mucosa [Normal Oropharynx] : normal oropharynx [Auscultation Breath Sounds / Voice Sounds] : lungs were clear to auscultation bilaterally [Normal Rate] : normal [Rhythm Regular] : regular [Normal S1] : normal S1 [Normal S2] : normal S2 [S4] : an S4 was heard [II] : a grade 2 [Abdomen Soft] : soft [Abdomen Tenderness] : non-tender [Abnormal Walk] : normal gait [Nail Clubbing] : no clubbing of the fingernails [Cyanosis, Localized] : no localized cyanosis [Skin Color & Pigmentation] : normal skin color and pigmentation [Oriented To Time, Place, And Person] : oriented to person, place, and time 19-Dec-2020 17:37 [Affect] : the affect was normal [S3] : no S3 [FreeTextEntry1] : Trace bilateral LE edema

## 2021-04-27 NOTE — DISCUSSION/SUMMARY
[FreeTextEntry1] : 1. Continue current cardiac meds in doses as noted above for hypertrophic cardiomyopathy and hypertension and dyslipidemia. \par 2. Dialysis per nephrology.  I will also defer to nephrology with regard to his antihypertensive regimen.\par 3. Monitor BP at home, keep a log and bring to f/u.\par 4. Continue monitoring of loop recorder and looking for additional pauses. No indication for pacemaker at this time. Followup with EP.\par 6. Plan PYP scan to evaluate for amyloidosis given hypertrophic and possibly infiltrative cardiomyopathy along with end-stage renal disease.\par 7. Hold off on sleep study at this time at patient's request but will reconsider at follow-up.\par 8. Follow up with primary doctor for treatment of diabetes.  Repeat blood work at this time.\par 9. Follow up here in 2 months.

## 2021-04-27 NOTE — HISTORY OF PRESENT ILLNESS
[FreeTextEntry1] : Patient returns to the office today feeling reasonably well.  He reports no issues with shortness of breath and his edema is been controlled.  He continues on dialysis without a problem.  He has started the evaluation for a renal transplant at [facility].  He saw a cardiologist there who cleared him for transplant purposes.  He did have the PET scan which showed no evidence of sarcoid but has yet to have a PYP scan.  He is taking his medication without a problem.  He has not been monitoring his blood pressure although they are checked at dialysis.  He does not know the numbers they are getting.  He needs to follow-up with nephrology.  Patient denies chest pain, palpitations, orthopnea, presyncope, syncope.

## 2021-04-27 NOTE — ASSESSMENT
[FreeTextEntry1] : Echocardiogram June 1, 2020 pressures left ventricle normal size and function with ejection fraction of 60-65%. Moderate concentric LVH noted. Moderate mitral regurgitation is noted which is a bit worse than his prior echo.\par \par Nuclear stress test January 28, 2021 was a pharmacologic study which was tolerated well.  Blood pressure was elevated at baseline with a normal response to infusion.  EKG showed horizontal ST depressions in V6.  Mobitz type I heart block was noted through the exam.  Evaluation of nuclear imaging showed no evidence of ischemia or infarct with ejection fraction of 64%.  Moderate LVH was noted.  Left ventricle is moderately dilated.\par \par EKG: Sinus rhythm with first-degree AV block.  Left anterior fascicular block.  Poor R wave progression.  Nonspecific T wave changes.\par \par 56-year-old man with a past medical history of hypertrophic cardiomyopathy, hypertension, diabetes who presents to me for followup evaluation.  Patient appears to be reasonably stable from a cardiac standpoint.  No evidence of significant worsening heart block or CHF at this time.  EKG again shows a first-degree AV block.  Review of his loop recorder shows occasional pauses but nothing very long and they all appear to be asymptomatic.  There remains no indication for pacemaker placement.  He is to follow-up with nephrology and is continue with dialysis.  He will also follow-up for kidney transplant for which there is no cardiac conjugation.  His blood pressures may be a bit elevated but I need to see numbers from dialysis and again would really defer to nephrology.  He did have the PET scan which showed no evidence of sarcoid but needs to have a PYP scan to rule out amyloidosis.

## 2021-04-29 ENCOUNTER — APPOINTMENT (OUTPATIENT)
Dept: CARDIOLOGY | Facility: CLINIC | Age: 56
End: 2021-04-29
Payer: MEDICAID

## 2021-04-29 PROCEDURE — 93298 REM INTERROG DEV EVAL SCRMS: CPT

## 2021-04-29 PROCEDURE — G2066: CPT

## 2021-05-04 ENCOUNTER — LABORATORY RESULT (OUTPATIENT)
Age: 56
End: 2021-05-04

## 2021-05-04 ENCOUNTER — APPOINTMENT (OUTPATIENT)
Dept: INFECTIOUS DISEASE | Facility: CLINIC | Age: 56
End: 2021-05-04
Payer: MEDICAID

## 2021-05-04 ENCOUNTER — OUTPATIENT (OUTPATIENT)
Dept: OUTPATIENT SERVICES | Facility: HOSPITAL | Age: 56
LOS: 1 days | End: 2021-05-04
Payer: MEDICAID

## 2021-05-04 VITALS
TEMPERATURE: 99 F | DIASTOLIC BLOOD PRESSURE: 78 MMHG | HEIGHT: 71 IN | HEART RATE: 79 BPM | OXYGEN SATURATION: 96 % | WEIGHT: 242 LBS | BODY MASS INDEX: 33.88 KG/M2 | SYSTOLIC BLOOD PRESSURE: 136 MMHG

## 2021-05-04 DIAGNOSIS — B97.89 OTHER VIRAL AGENTS AS THE CAUSE OF DISEASES CLASSIFIED ELSEWHERE: ICD-10-CM

## 2021-05-04 DIAGNOSIS — Z98.890 OTHER SPECIFIED POSTPROCEDURAL STATES: Chronic | ICD-10-CM

## 2021-05-04 DIAGNOSIS — Z92.89 PERSONAL HISTORY OF OTHER MEDICAL TREATMENT: Chronic | ICD-10-CM

## 2021-05-04 DIAGNOSIS — Z22.7 LATENT TUBERCULOSIS: ICD-10-CM

## 2021-05-04 PROCEDURE — 80053 COMPREHEN METABOLIC PANEL: CPT

## 2021-05-04 PROCEDURE — 85027 COMPLETE CBC AUTOMATED: CPT

## 2021-05-04 PROCEDURE — G0463: CPT

## 2021-05-04 PROCEDURE — 99212 OFFICE O/P EST SF 10 MIN: CPT

## 2021-05-04 NOTE — ASSESSMENT
[FreeTextEntry1] : 56 M ESRD on HD (MWF), HTN, HLD, DM, seizure history, presents today for Latent TB.\par On work-up for renal transplant found to have positive quantiferon gold.\par \par Consistent with Latent TB.\par On depakote and efficacy can be lowered from INH. D/w neuro and okay to start INH and monitor depakote levels. \par \par Prefer INH over rifampin b/c rifampin interacts with transplant meds in case he were to get transplant.\par \par Start  mg po daily with Vit B6 50 mg po daily\par Start date 1/13/2021 - end date 10/13/2021.\par \par Side effects: GI upset, neuropathy, rash, hepatitis, avoid alcohol.  All d/w pt. He understands. \par Pt is not contagious to others. \par \par Hep B vaccine completed 2/9/2021.\par \par Check CBC w/ diff, CMP today.\par \par RTC 2 months\par \par

## 2021-05-04 NOTE — HISTORY OF PRESENT ILLNESS
[FreeTextEntry1] : 56 M ESRD on HD (MWF), HTN, HLD, DM, seizure history, presents today for Latent TB.\par On work-up for renal transplant found to have positive quantiferon gold.\par \par ROS: No fevers, no sweats, no chills, no cough, no hemoptysis, no weight loss, denies swollen glands.\par [place] at 13 years old.\par Lives with sister.\par No known contacts with Tuberculosis.  \par Never treated for tuberculosis.\par \par Quantiferon gold was positive on 12/22/2020\par Chest xray done 12/8/2020 which has no airspace consolidations and no acute cardiopulmonary pathology.\par Has AVF from 12/16/2020 - RUE. Using now. Catheter removed. \par \par Of note:\par Hep B surface ab negative\par HSV-1: positive. Pt does not get cold sores.\par CMV IgG positive\par Toxoplasma IgG positive\par \par Started INH/B6 1/2021. To complete 9 months. No side effects noted.  Does not miss any doses. \par Received Hep B vaccine in my office x 2 doses. Also received a dose of different Hep B vaccine at dialysis. Will complete my series today with dose #2. No need to get at dialysis center.  Will check Hep surface ab in future.

## 2021-05-04 NOTE — PHYSICAL EXAM
[General Appearance - Alert] : alert [General Appearance - In No Acute Distress] : in no acute distress [Sclera] : the sclera and conjunctiva were normal [PERRL With Normal Accommodation] : pupils were equal in size, round, reactive to light [Extraocular Movements] : extraocular movements were intact [Outer Ear] : the ears and nose were normal in appearance [Oropharynx] : the oropharynx was normal with no thrush [Neck Cervical Mass (___cm)] : no neck mass was observed [Neck Appearance] : the appearance of the neck was normal [Jugular Venous Distention Increased] : there was no jugular-venous distention [Thyroid Diffuse Enlargement] : the thyroid was not enlarged [] : no respiratory distress [Auscultation Breath Sounds / Voice Sounds] : lungs were clear to auscultation bilaterally [Heart Rate And Rhythm] : heart rate was normal and rhythm regular [Bowel Sounds] : normal bowel sounds [Abdomen Soft] : soft [Cervical Lymph Nodes Enlarged Posterior Bilaterally] : posterior cervical [Cervical Lymph Nodes Enlarged Anterior Bilaterally] : anterior cervical [Supraclavicular Lymph Nodes Enlarged Bilaterally] : supraclavicular [Skin Lesions] : no skin lesions [No Focal Deficits] : no focal deficits [Oriented To Time, Place, And Person] : oriented to person, place, and time [Affect] : the affect was normal [FreeTextEntry1] : +murmur

## 2021-05-04 NOTE — CONSULT LETTER
[Consult Letter:] : I had the pleasure of evaluating your patient, [unfilled]. [Please see my note below.] : Please see my note below. [Consult Closing:] : Thank you very much for allowing me to participate in the care of this patient.  If you have any questions, please do not hesitate to contact me. [Sincerely,] : Sincerely, [DrGeovani  ___] : Dr. LAZAR [FreeTextEntry2] : Dr. Crocker [FreeTextEntry3] : \par Jessica Alvarez MD\par  of Medicine\par Division of Infectious Diseases\par [facility] [employer] [facility] [facility] [employer] [facility]\par [address] [place] [address] DrGeovani\[place] [address]\par Tel: [phone]\par Fax: [phone]\par

## 2021-05-05 ENCOUNTER — NON-APPOINTMENT (OUTPATIENT)
Age: 56
End: 2021-05-05

## 2021-05-05 LAB
ALBUMIN SERPL ELPH-MCNC: 3.8 G/DL — SIGNIFICANT CHANGE UP (ref 3.3–5)
ALP SERPL-CCNC: 111 U/L — SIGNIFICANT CHANGE UP (ref 40–120)
ALT FLD-CCNC: 6 U/L — LOW (ref 10–45)
ANION GAP SERPL CALC-SCNC: 11 MMOL/L — SIGNIFICANT CHANGE UP (ref 5–17)
AST SERPL-CCNC: 9 U/L — LOW (ref 10–40)
BILIRUB SERPL-MCNC: 0.5 MG/DL — SIGNIFICANT CHANGE UP (ref 0.2–1.2)
BUN SERPL-MCNC: 27 MG/DL — HIGH (ref 7–23)
CALCIUM SERPL-MCNC: 8.6 MG/DL — SIGNIFICANT CHANGE UP (ref 8.4–10.5)
CHLORIDE SERPL-SCNC: 95 MMOL/L — LOW (ref 96–108)
CO2 SERPL-SCNC: 30 MMOL/L — SIGNIFICANT CHANGE UP (ref 22–31)
CREAT SERPL-MCNC: 4.71 MG/DL — HIGH (ref 0.5–1.3)
GLUCOSE SERPL-MCNC: 470 MG/DL — CRITICAL HIGH (ref 70–99)
HCT VFR BLD CALC: 31.2 % — LOW (ref 39–50)
HGB BLD-MCNC: 10.3 G/DL — LOW (ref 13–17)
MCHC RBC-ENTMCNC: 29.3 PG — SIGNIFICANT CHANGE UP (ref 27–34)
MCHC RBC-ENTMCNC: 33 GM/DL — SIGNIFICANT CHANGE UP (ref 32–36)
MCV RBC AUTO: 88.6 FL — SIGNIFICANT CHANGE UP (ref 80–100)
PLATELET # BLD AUTO: 211 K/UL — SIGNIFICANT CHANGE UP (ref 150–400)
POTASSIUM SERPL-MCNC: 4.6 MMOL/L — SIGNIFICANT CHANGE UP (ref 3.5–5.3)
POTASSIUM SERPL-SCNC: 4.6 MMOL/L — SIGNIFICANT CHANGE UP (ref 3.5–5.3)
PROT SERPL-MCNC: 6.4 G/DL — SIGNIFICANT CHANGE UP (ref 6–8.3)
RBC # BLD: 3.52 M/UL — LOW (ref 4.2–5.8)
RBC # FLD: 13.4 % — SIGNIFICANT CHANGE UP (ref 10.3–14.5)
SODIUM SERPL-SCNC: 137 MMOL/L — SIGNIFICANT CHANGE UP (ref 135–145)
WBC # BLD: 5.59 K/UL — SIGNIFICANT CHANGE UP (ref 3.8–10.5)
WBC # FLD AUTO: 5.59 K/UL — SIGNIFICANT CHANGE UP (ref 3.8–10.5)

## 2021-05-06 ENCOUNTER — APPOINTMENT (OUTPATIENT)
Dept: NEPHROLOGY | Facility: CLINIC | Age: 56
End: 2021-05-06

## 2021-05-10 ENCOUNTER — APPOINTMENT (OUTPATIENT)
Dept: TRANSPLANT | Facility: CLINIC | Age: 56
End: 2021-05-10
Payer: MEDICAID

## 2021-05-10 PROCEDURE — 99001T: CUSTOM

## 2021-06-08 ENCOUNTER — APPOINTMENT (OUTPATIENT)
Dept: NEPHROLOGY | Facility: CLINIC | Age: 56
End: 2021-06-08

## 2021-06-11 ENCOUNTER — APPOINTMENT (OUTPATIENT)
Dept: TRANSPLANT | Facility: CLINIC | Age: 56
End: 2021-06-11
Payer: MEDICAID

## 2021-06-11 PROCEDURE — 86833 HLA CLASS II HIGH DEFIN QUAL: CPT

## 2021-06-11 PROCEDURE — 86832 HLA CLASS I HIGH DEFIN QUAL: CPT

## 2021-06-11 PROCEDURE — 99001T: CUSTOM

## 2021-06-29 ENCOUNTER — APPOINTMENT (OUTPATIENT)
Dept: NUCLEAR MEDICINE | Facility: CLINIC | Age: 56
End: 2021-06-29
Payer: MEDICAID

## 2021-06-29 ENCOUNTER — OUTPATIENT (OUTPATIENT)
Dept: OUTPATIENT SERVICES | Facility: HOSPITAL | Age: 56
LOS: 1 days | End: 2021-06-29

## 2021-06-29 DIAGNOSIS — Z98.890 OTHER SPECIFIED POSTPROCEDURAL STATES: Chronic | ICD-10-CM

## 2021-06-29 DIAGNOSIS — Z92.89 PERSONAL HISTORY OF OTHER MEDICAL TREATMENT: Chronic | ICD-10-CM

## 2021-06-29 DIAGNOSIS — Z00.00 ENCOUNTER FOR GENERAL ADULT MEDICAL EXAMINATION WITHOUT ABNORMAL FINDINGS: ICD-10-CM

## 2021-06-29 PROCEDURE — 78803 RP LOCLZJ TUM SPECT 1 AREA: CPT | Mod: 26

## 2021-07-01 ENCOUNTER — NON-APPOINTMENT (OUTPATIENT)
Age: 56
End: 2021-07-01

## 2021-07-01 ENCOUNTER — APPOINTMENT (OUTPATIENT)
Dept: CARDIOLOGY | Facility: CLINIC | Age: 56
End: 2021-07-01
Payer: MEDICAID

## 2021-07-01 VITALS
WEIGHT: 240 LBS | RESPIRATION RATE: 16 BRPM | HEIGHT: 71 IN | SYSTOLIC BLOOD PRESSURE: 167 MMHG | BODY MASS INDEX: 33.6 KG/M2 | DIASTOLIC BLOOD PRESSURE: 69 MMHG | HEART RATE: 76 BPM

## 2021-07-01 PROCEDURE — 99214 OFFICE O/P EST MOD 30 MIN: CPT

## 2021-07-01 PROCEDURE — 93000 ELECTROCARDIOGRAM COMPLETE: CPT

## 2021-07-01 RX ORDER — AMLODIPINE BESYLATE 5 MG/1
5 TABLET ORAL
Qty: 90 | Refills: 3 | Status: DISCONTINUED | COMMUNITY
Start: 2021-03-12 | End: 2021-07-01

## 2021-07-01 NOTE — PHYSICAL EXAM
[General Appearance - In No Acute Distress] : no acute distress [Normal Conjunctiva] : the conjunctiva exhibited no abnormalities [Normal Oral Mucosa] : normal oral mucosa [Normal Oropharynx] : normal oropharynx [Auscultation Breath Sounds / Voice Sounds] : lungs were clear to auscultation bilaterally [Normal Rate] : normal [Rhythm Regular] : regular [Normal S1] : normal S1 [Normal S2] : normal S2 [S4] : an S4 was heard [II] : a grade 2 [Abdomen Tenderness] : non-tender [Abdomen Soft] : soft [Abnormal Walk] : normal gait [Cyanosis, Localized] : no localized cyanosis [Nail Clubbing] : no clubbing of the fingernails [Skin Color & Pigmentation] : normal skin color and pigmentation [Oriented To Time, Place, And Person] : oriented to person, place, and time [Affect] : the affect was normal [S3] : no S3 [FreeTextEntry1] : Trace-1+ bilateral LE edema

## 2021-07-01 NOTE — HISTORY OF PRESENT ILLNESS
[FreeTextEntry1] : Patient presents back to the office today feeling fairly well.  He continues with dialysis without a problem although still has a lot of fatigue on his dialysis days after completing dialysis.  His blood pressure meds have been further adjusted and since then he says they are better controlled.  His edema has also been controlled.  He reports no significant shortness of breath.  Patient denies chest pain, palpitations, orthopnea, presyncope, syncope.

## 2021-07-01 NOTE — DISCUSSION/SUMMARY
[FreeTextEntry1] : 1. Continue current cardiac meds in doses as noted above for hypertrophic cardiomyopathy and hypertension and dyslipidemia. \par 2. Dialysis per nephrology.  I will also defer to nephrology with regard to his antihypertensive regimen.\par 3. Monitor BP at home, keep a log and bring to f/u.\par 4. Continue monitoring of loop recorder and looking for additional pauses. No indication for pacemaker at this time. Followup with EP.\par 5. Patient will have a home sleep study as he does not seem to be able to get into the sleep lab.\par 6. Follow up with primary doctor for treatment of diabetes. \par 7. Follow up here in 3 months.

## 2021-07-01 NOTE — ASSESSMENT
[FreeTextEntry1] : Echocardiogram June 1, 2020 pressures left ventricle normal size and function with ejection fraction of 60-65%. Moderate concentric LVH noted. Moderate mitral regurgitation is noted which is a bit worse than his prior echo.\par \par Nuclear stress test January 28, 2021 was a pharmacologic study which was tolerated well.  Blood pressure was elevated at baseline with a normal response to infusion.  EKG showed horizontal ST depressions in V6.  Mobitz type I heart block was noted through the exam.  Evaluation of nuclear imaging showed no evidence of ischemia or infarct with ejection fraction of 64%.  Moderate LVH was noted.  Left ventricle is moderately dilated.\par \par EKG: Sinus rhythm with first-degree AV block with intermittent third-degree block.  Left anterior fascicular block.  Poor R wave progression.  Nonspecific T wave changes.\par \par 56-year-old man with a past medical history of hypertrophic cardiomyopathy, hypertension, diabetes who presents to me for followup evaluation.  Patient appears to be reasonably stable from a cardiac standpoint.  No evidence of significant worsening heart block or CHF at this time.  EKG again shows a first-degree AV block.  Review of his loop recorder shows occasional pauses but nothing very long and they all appear to be asymptomatic.  There remains no indication for pacemaker placement.  He is to follow-up with nephrology and is continue with dialysis.  He will also follow-up for kidney transplant for which there is no cardiac contraindication.  PYP scan shows no evidence of amyloidosis.  Blood pressure appears to be controlled.  There is no evidence of acute heart failure at this time.

## 2021-07-06 ENCOUNTER — OUTPATIENT (OUTPATIENT)
Dept: OUTPATIENT SERVICES | Facility: HOSPITAL | Age: 56
LOS: 1 days | End: 2021-07-06
Payer: MEDICAID

## 2021-07-06 ENCOUNTER — LABORATORY RESULT (OUTPATIENT)
Age: 56
End: 2021-07-06

## 2021-07-06 ENCOUNTER — APPOINTMENT (OUTPATIENT)
Dept: INFECTIOUS DISEASE | Facility: CLINIC | Age: 56
End: 2021-07-06
Payer: MEDICAID

## 2021-07-06 VITALS
HEART RATE: 76 BPM | TEMPERATURE: 98.6 F | HEIGHT: 71 IN | BODY MASS INDEX: 34.16 KG/M2 | SYSTOLIC BLOOD PRESSURE: 154 MMHG | OXYGEN SATURATION: 97 % | WEIGHT: 244 LBS | DIASTOLIC BLOOD PRESSURE: 82 MMHG

## 2021-07-06 DIAGNOSIS — Z98.890 OTHER SPECIFIED POSTPROCEDURAL STATES: Chronic | ICD-10-CM

## 2021-07-06 DIAGNOSIS — B97.89 OTHER VIRAL AGENTS AS THE CAUSE OF DISEASES CLASSIFIED ELSEWHERE: ICD-10-CM

## 2021-07-06 DIAGNOSIS — Z92.89 PERSONAL HISTORY OF OTHER MEDICAL TREATMENT: Chronic | ICD-10-CM

## 2021-07-06 LAB
ALBUMIN SERPL ELPH-MCNC: 3.8 G/DL — SIGNIFICANT CHANGE UP (ref 3.3–5)
ALP SERPL-CCNC: 68 U/L — SIGNIFICANT CHANGE UP (ref 40–120)
ALT FLD-CCNC: 8 U/L — LOW (ref 10–45)
ANION GAP SERPL CALC-SCNC: 13 MMOL/L — SIGNIFICANT CHANGE UP (ref 5–17)
AST SERPL-CCNC: 13 U/L — SIGNIFICANT CHANGE UP (ref 10–40)
BILIRUB SERPL-MCNC: 0.6 MG/DL — SIGNIFICANT CHANGE UP (ref 0.2–1.2)
BUN SERPL-MCNC: 25 MG/DL — HIGH (ref 7–23)
CALCIUM SERPL-MCNC: 8.3 MG/DL — LOW (ref 8.4–10.5)
CHLORIDE SERPL-SCNC: 98 MMOL/L — SIGNIFICANT CHANGE UP (ref 96–108)
CO2 SERPL-SCNC: 28 MMOL/L — SIGNIFICANT CHANGE UP (ref 22–31)
CREAT SERPL-MCNC: 4.84 MG/DL — HIGH (ref 0.5–1.3)
GLUCOSE SERPL-MCNC: 260 MG/DL — HIGH (ref 70–99)
HCT VFR BLD CALC: 27.9 % — LOW (ref 39–50)
HGB BLD-MCNC: 9.1 G/DL — LOW (ref 13–17)
MCHC RBC-ENTMCNC: 29.5 PG — SIGNIFICANT CHANGE UP (ref 27–34)
MCHC RBC-ENTMCNC: 32.6 GM/DL — SIGNIFICANT CHANGE UP (ref 32–36)
MCV RBC AUTO: 90.6 FL — SIGNIFICANT CHANGE UP (ref 80–100)
PLATELET # BLD AUTO: 219 K/UL — SIGNIFICANT CHANGE UP (ref 150–400)
POTASSIUM SERPL-MCNC: 3.9 MMOL/L — SIGNIFICANT CHANGE UP (ref 3.5–5.3)
POTASSIUM SERPL-SCNC: 3.9 MMOL/L — SIGNIFICANT CHANGE UP (ref 3.5–5.3)
PROT SERPL-MCNC: 6.2 G/DL — SIGNIFICANT CHANGE UP (ref 6–8.3)
RBC # BLD: 3.08 M/UL — LOW (ref 4.2–5.8)
RBC # FLD: 14.5 % — SIGNIFICANT CHANGE UP (ref 10.3–14.5)
SODIUM SERPL-SCNC: 138 MMOL/L — SIGNIFICANT CHANGE UP (ref 135–145)
WBC # BLD: 6.26 K/UL — SIGNIFICANT CHANGE UP (ref 3.8–10.5)
WBC # FLD AUTO: 6.26 K/UL — SIGNIFICANT CHANGE UP (ref 3.8–10.5)

## 2021-07-06 PROCEDURE — 99213 OFFICE O/P EST LOW 20 MIN: CPT

## 2021-07-06 PROCEDURE — G0463: CPT

## 2021-07-06 PROCEDURE — 80053 COMPREHEN METABOLIC PANEL: CPT

## 2021-07-06 PROCEDURE — 85027 COMPLETE CBC AUTOMATED: CPT

## 2021-07-06 NOTE — ASSESSMENT
[FreeTextEntry1] : 56 M ESRD on HD (MWF), HTN, HLD, DM, seizure history, presents today for Latent TB.\par On work-up for renal transplant found to have positive quantiferon gold.\par \par Consistent with Latent TB.\par On depakote and efficacy can be lowered from INH. D/w neuro and okay to start INH and monitor depakote levels. \par \par Prefer INH over rifampin b/c rifampin interacts with transplant meds in case he were to get transplant.\par \par Continue  mg po daily with Vit B6 50 mg po daily\par Start date 1/13/2021 - end date 10/13/2021.\par \par Side effects: GI upset, neuropathy, rash, hepatitis, avoid alcohol.  All d/w pt. He understands. No side effects to date.\par Pt is not contagious to others. \par \par Hep B vaccine completed 2/9/2021.\par \par Check CBC w/ diff, CMP today.  Last labs stable and LFTs normal.\par \par RTC 2 months\par \par

## 2021-07-06 NOTE — HISTORY OF PRESENT ILLNESS
[FreeTextEntry1] : 56 M ESRD on HD (MWF), HTN, HLD, DM, seizure history, presents today for Latent TB.\par On work-up for renal transplant found to have positive quantiferon gold.\par \par ROS: No fevers, no sweats, no chills, no cough, no hemoptysis, no weight loss, denies swollen glands.\par [place] 13 years old.\par Lives with sister.\par No known contacts with Tuberculosis.  \par Never treated for tuberculosis.\par \par Quantiferon gold was positive on 12/22/2020\par Chest xray done 12/8/2020 which has no airspace consolidations and no acute cardiopulmonary pathology.\par Has AVF from 12/16/2020 - RUE. Using now. Catheter removed. \par \par Of note:\par Hep B surface ab negative\par HSV-1: positive. Pt does not get cold sores.\par CMV IgG positive\par Toxoplasma IgG positive\par \par Started INH/B6 1/2021. To complete 9 months. No side effects noted.  Does not miss any doses. \par Received Hep B vaccine in my office x 2 doses. Also received a dose of different Hep B vaccine at dialysis. Completed my series with dose #2. No need to get at dialysis center.  Will check Hep surface ab in future.

## 2021-07-06 NOTE — CONSULT LETTER
[Consult Letter:] : I had the pleasure of evaluating your patient, [unfilled]. [Please see my note below.] : Please see my note below. [Consult Closing:] : Thank you very much for allowing me to participate in the care of this patient.  If you have any questions, please do not hesitate to contact me. [Sincerely,] : Sincerely, [DrGeovani  ___] : Dr. LAZAR [FreeTextEntry2] : Dr. Crocker [FreeTextEntry3] : \par Jessica Alvarez MD\par  of Medicine\par Division of Infectious Diseases\par [facility] [employer] [facility] [employer] [facility] at [facility]\par [address] [place] [address] DrGeovani\[place] [address]\par Tel: [phone]\par Fax: [phone]\par

## 2021-07-06 NOTE — PHYSICAL EXAM
[General Appearance - Alert] : alert [General Appearance - In No Acute Distress] : in no acute distress [Sclera] : the sclera and conjunctiva were normal [PERRL With Normal Accommodation] : pupils were equal in size, round, reactive to light [Extraocular Movements] : extraocular movements were intact [Outer Ear] : the ears and nose were normal in appearance [Oropharynx] : the oropharynx was normal with no thrush [Neck Appearance] : the appearance of the neck was normal [Neck Cervical Mass (___cm)] : no neck mass was observed [Jugular Venous Distention Increased] : there was no jugular-venous distention [Thyroid Diffuse Enlargement] : the thyroid was not enlarged [] : no respiratory distress [Auscultation Breath Sounds / Voice Sounds] : lungs were clear to auscultation bilaterally [Heart Rate And Rhythm] : heart rate was normal and rhythm regular [Bowel Sounds] : normal bowel sounds [Abdomen Soft] : soft [Cervical Lymph Nodes Enlarged Posterior Bilaterally] : posterior cervical [Cervical Lymph Nodes Enlarged Anterior Bilaterally] : anterior cervical [Supraclavicular Lymph Nodes Enlarged Bilaterally] : supraclavicular [Skin Lesions] : no skin lesions [No Focal Deficits] : no focal deficits [Oriented To Time, Place, And Person] : oriented to person, place, and time [Affect] : the affect was normal [FreeTextEntry1] : +murmur

## 2021-07-07 DIAGNOSIS — Z22.7 LATENT TUBERCULOSIS: ICD-10-CM

## 2021-07-08 ENCOUNTER — APPOINTMENT (OUTPATIENT)
Dept: CARDIOLOGY | Facility: CLINIC | Age: 56
End: 2021-07-08
Payer: MEDICAID

## 2021-07-08 PROCEDURE — G2066: CPT

## 2021-07-08 PROCEDURE — 93298 REM INTERROG DEV EVAL SCRMS: CPT

## 2021-07-12 ENCOUNTER — APPOINTMENT (OUTPATIENT)
Dept: TRANSPLANT | Facility: CLINIC | Age: 56
End: 2021-07-12
Payer: MEDICAID

## 2021-07-12 PROCEDURE — 99001T: CUSTOM

## 2021-08-02 ENCOUNTER — APPOINTMENT (OUTPATIENT)
Dept: TRANSPLANT | Facility: CLINIC | Age: 56
End: 2021-08-02
Payer: MEDICAID

## 2021-08-02 PROCEDURE — 99001T: CUSTOM

## 2021-08-03 ENCOUNTER — NON-APPOINTMENT (OUTPATIENT)
Age: 56
End: 2021-08-03

## 2021-08-03 ENCOUNTER — APPOINTMENT (OUTPATIENT)
Dept: ELECTROPHYSIOLOGY | Facility: CLINIC | Age: 56
End: 2021-08-03
Payer: MEDICAID

## 2021-08-03 VITALS
SYSTOLIC BLOOD PRESSURE: 111 MMHG | BODY MASS INDEX: 33.6 KG/M2 | WEIGHT: 240 LBS | HEIGHT: 71 IN | OXYGEN SATURATION: 97 % | HEART RATE: 78 BPM | DIASTOLIC BLOOD PRESSURE: 66 MMHG | TEMPERATURE: 98.3 F

## 2021-08-03 DIAGNOSIS — R06.83 SNORING: ICD-10-CM

## 2021-08-03 PROCEDURE — 99215 OFFICE O/P EST HI 40 MIN: CPT

## 2021-08-03 PROCEDURE — 93000 ELECTROCARDIOGRAM COMPLETE: CPT

## 2021-08-03 NOTE — PHYSICAL EXAM
[Well Developed] : well developed [Well Nourished] : well nourished [No Acute Distress] : no acute distress [No Respiratory Distress] : no respiratory distress  [Normal Gait] : normal gait [No Edema] : no edema [Moves all extremities] : moves all extremities [No Focal Deficits] : no focal deficits [Alert and Oriented] : alert and oriented [Normal memory] : normal memory

## 2021-08-07 NOTE — CARDIOLOGY SUMMARY
[de-identified] : \par 08/03/2021: NSR with Mobitz I block. [de-identified] : \par 01/28/2021 Pharmacologic NST: LVEF: 64%. LV moderately dilated.\par  [de-identified] : \par 8/14/2020 TTE: Mild LVH (1.2cm IVS). LVEF: 60-65%. RV mildly dilated. Mild MR/TR. LAd: 4.2 cm. \par 6/1/2020 TTE: Moderate concentric LVH (IVSd 1.4 cm). LVEF: 60-65%. LA mildly dilated (LAd: 3.9cm). RA/RV normal. Moderate MR. \par  [de-identified] : \par 01/19/2021 CT Chest: No lymphadenopathy.\par  [de-identified] : \par 06/29/2021 Nuclear Pyrophosphate Scan: Not suggestive of transthyretin cardiac amyloidosis.\par 03/23/2021 PET/CT Sarcoid: No evidence of cardiac or extracardiac sarcoidosis. No scan evidence of myocardial inflammation. Normal resting myocardial perfusion imaging. LVEF: 63%. No lymphadenopathy.\par

## 2021-08-07 NOTE — DISCUSSION/SUMMARY
[FreeTextEntry1] : SARAH MORFIN is a 56 year old male with hypertension, diabetes mellitus, seizure disorder, ESRD on HD (secondary to Diabetic Nephropathy via AVF), hypertrophic cardiomyopathy with LVOT obstruction and ILR implant who presents for follow up.\par \par He is doing well but his ILR continues to show multiple episodes of high grade AV block. The majority of these episodes appear to happen at night or at times where he may be sleeping (such as when at dialysis). Though he has fatigue all the time, he does not have any syncope. His wife notes that he snores a lot but he has not yet been able to be evaluated for sleep apnea. I feel that the first step for evaluation would be to rule out obstructive sleep apnea and to treat it. If he continues to have episodes of high grade AV block, then he would benefit with pacemaker implantation.\par \par He likely has baseline conduction disease given baseline first degree AVB and Mobitz I block. Some literature suggests that Valproic Acid could cause AV conduction disturbances. As such, I have asked Dr. Chawla if we could trial an alternative agent, he will assist with referral to Epilepsy department. Of note, in review of his prior ECGs, he did have a first degree AV block even before starting Valproic Acid so it is possible the Valproic Acid may not be the cause of his conduction disturbance (though perhaps it could be worsening it?).\par \par Recommendations:\par - Sleep Study\par - Consider changing valproic acid to alternative AAD (Epilepsy evaluation pending)\par \par Manjeet Montaño MD, FACC, RS\par Clinical Cardiac Electrophysiology

## 2021-08-07 NOTE — HISTORY OF PRESENT ILLNESS
[FreeTextEntry1] : SARAH MORFIN is a 56 year old male with hypertension, diabetes mellitus, seizure disorder, ESRD on HD (secondary to Diabetic Nephropathy via AVF), hypertrophic cardiomyopathy with LVOT obstruction and ILR implant who presents for follow up. He presents with his wife.\par \par To summarize his history, he saw Dr. Johansen in 10/2018 for consideration of ILR implantation for surveillance of occult AF and NSVT as patient had a history of "seizures." He underwent ILR implantation on 12/7/2018. Since then his ILR has demonstrated increasing episodes of AV block which were first noticed in early 2020 including periods of complete heart block, though these occur predominantly at night. Review of rate histograms has also shown progressive bradycardia with Mobitz I block.\par \par He underwent additional work up with cardiac PET and nuclear pyrophosphate scans which were negative for cardiac sarcoid and amyloidosis, respectively. He is currently awaiting renal transplant.\par \par Today, he is doing OK but he does complain of feeling quite fatigued after his HD sessions. He denies having any syncope. He reports that he was planned to undergo sleep study but this was cancelled and was never rescheduled.

## 2021-08-17 ENCOUNTER — APPOINTMENT (OUTPATIENT)
Dept: NEUROLOGY | Facility: CLINIC | Age: 56
End: 2021-08-17
Payer: MEDICAID

## 2021-08-17 VITALS
HEIGHT: 71 IN | SYSTOLIC BLOOD PRESSURE: 118 MMHG | BODY MASS INDEX: 33.6 KG/M2 | WEIGHT: 240 LBS | DIASTOLIC BLOOD PRESSURE: 72 MMHG

## 2021-08-17 DIAGNOSIS — R42 DIZZINESS AND GIDDINESS: ICD-10-CM

## 2021-08-17 PROCEDURE — 99213 OFFICE O/P EST LOW 20 MIN: CPT

## 2021-08-17 NOTE — PHYSICAL EXAM
[General Appearance - Alert] : alert [General Appearance - In No Acute Distress] : in no acute distress [Oriented To Time, Place, And Person] : oriented to person, place, and time [Affect] : the affect was normal [Memory Recent] : recent memory was not impaired [Memory Remote] : remote memory was not impaired [Cranial Nerves Optic (II)] : visual acuity intact bilaterally,  visual fields full to confrontation, pupils equal round and reactive to light [Cranial Nerves Oculomotor (III)] : extraocular motion intact [Cranial Nerves Trigeminal (V)] : facial sensation intact symmetrically [Cranial Nerves Facial (VII)] : face symmetrical [Cranial Nerves Vestibulocochlear (VIII)] : hearing was intact bilaterally [Cranial Nerves Glossopharyngeal (IX)] : tongue and palate midline [Cranial Nerves Accessory (XI - Cranial And Spinal)] : head turning and shoulder shrug symmetric [Cranial Nerves Hypoglossal (XII)] : there was no tongue deviation with protrusion [Motor Tone] : muscle tone was normal in all four extremities [Motor Strength] : muscle strength was normal in all four extremities [Sensation Tactile Decrease] : light touch was intact [Sensation Pain / Temperature Decrease] : pain and temperature was intact [Sensation Vibration Decrease] : vibration was intact [Abnormal Walk] : normal gait [1+] : Patella left 1+ [PERRL With Normal Accommodation] : pupils were equal in size, round, reactive to light, with normal accommodation [Edema] : there was no peripheral edema [Optic Disc Abnormality] : the optic disc were normal in size and color [Involuntary Movements] : no involuntary movements were seen [Dysarthria] : no dysarthria [Aphasia] : no dysphasia/aphasia [Romberg's Sign] : Romberg's sign was negtive [Coordination - Dysmetria Impaired Finger-to-Nose Bilateral] : not present [Plantar Reflex Right Only] : normal on the right [Plantar Reflex Left Only] : normal on the left

## 2021-08-17 NOTE — DATA REVIEWED
[de-identified] : Brain MRI was performed on 6/18/18 at [facility].\par There were some scattered foci of nonspecific gliosis which were unchanged from a prior study in December of 2017.\par  [de-identified] : EEG was performed in the hospital on 6/18/18.\par The study demonstrated some focal slowing over the left anterior temporal region. There was also spike and wave activity in the left frontotemporal region.\par
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
## 2021-08-18 NOTE — H&P PST ADULT - NEGATIVE CARDIOVASCULAR SYMPTOMS
No VOF for us to speak to mother.  Patient is 21 y/o.    Left voicemail stating that Mary needs to call office back herself.    Electronically Signed by: Lydia Troy CMA         no chest pain/no palpitations/no dyspnea on exertion

## 2021-09-07 ENCOUNTER — OUTPATIENT (OUTPATIENT)
Dept: OUTPATIENT SERVICES | Facility: HOSPITAL | Age: 56
LOS: 1 days | End: 2021-09-07
Payer: MEDICAID

## 2021-09-07 ENCOUNTER — LABORATORY RESULT (OUTPATIENT)
Age: 56
End: 2021-09-07

## 2021-09-07 ENCOUNTER — APPOINTMENT (OUTPATIENT)
Dept: INFECTIOUS DISEASE | Facility: CLINIC | Age: 56
End: 2021-09-07
Payer: MEDICAID

## 2021-09-07 VITALS
HEART RATE: 75 BPM | TEMPERATURE: 98.4 F | BODY MASS INDEX: 33.18 KG/M2 | SYSTOLIC BLOOD PRESSURE: 129 MMHG | WEIGHT: 237 LBS | HEIGHT: 71 IN | DIASTOLIC BLOOD PRESSURE: 78 MMHG | OXYGEN SATURATION: 98 %

## 2021-09-07 DIAGNOSIS — Z98.890 OTHER SPECIFIED POSTPROCEDURAL STATES: Chronic | ICD-10-CM

## 2021-09-07 DIAGNOSIS — Z22.7 LATENT TUBERCULOSIS: ICD-10-CM

## 2021-09-07 DIAGNOSIS — Z92.89 PERSONAL HISTORY OF OTHER MEDICAL TREATMENT: Chronic | ICD-10-CM

## 2021-09-07 DIAGNOSIS — B97.89 OTHER VIRAL AGENTS AS THE CAUSE OF DISEASES CLASSIFIED ELSEWHERE: ICD-10-CM

## 2021-09-07 PROCEDURE — G0463: CPT

## 2021-09-07 PROCEDURE — 85027 COMPLETE CBC AUTOMATED: CPT

## 2021-09-07 PROCEDURE — 99213 OFFICE O/P EST LOW 20 MIN: CPT

## 2021-09-07 PROCEDURE — 80053 COMPREHEN METABOLIC PANEL: CPT

## 2021-09-07 NOTE — ASSESSMENT
[FreeTextEntry1] : 56 M ESRD on HD (MWF), HTN, HLD, DM, seizure history, presents today for Latent TB.\par On work-up for renal transplant found to have positive quantiferon gold.\par \par Consistent with Latent TB.\par On depakote and efficacy can be lowered from INH. D/w neuro and okay to start INH and monitor depakote levels. \par \par Prefer INH over rifampin b/c rifampin interacts with transplant meds in case he were to get transplant.\par \par Continue  mg po daily with Vit B6 50 mg po daily\par Start date 1/13/2021 - end date 10/13/2021.\par \par Side effects: GI upset, neuropathy, rash, hepatitis, avoid alcohol.  All d/w pt. He understands. No side effects to date.\par Pt is not contagious to others. \par \par Hep B vaccine completed 2/9/2021.\par \par Check CBC w/ diff, CMP today.  Last labs stable and LFTs normal.\par RTC 6 weeks for repeat CBC, CMP and also check Hep B surface ab b/c completed vaccine series. \par \par \par

## 2021-09-07 NOTE — HISTORY OF PRESENT ILLNESS
[FreeTextEntry1] : 56 M ESRD on HD (MWF), HTN, HLD, DM, seizure history, presents today for Latent TB.\par On work-up for renal transplant found to have positive quantiferon gold.\par \par ROS: No fevers, no sweats, no chills, no cough, no hemoptysis, no weight loss, denies swollen glands.\par [place] at 13 years old.\par Lives with sister.\par No known contacts with Tuberculosis.  \par Never treated for tuberculosis.\par \par Quantiferon gold was positive on 12/22/2020\par Chest xray done 12/8/2020 which has no airspace consolidations and no acute cardiopulmonary pathology.\par Has AVF from 12/16/2020 - RUE. Using now. Catheter removed. \par \par Of note:\par Hep B surface ab negative\par HSV-1: positive. Pt does not get cold sores.\par CMV IgG positive\par Toxoplasma IgG positive\par \par Started INH/B6 1/2021. To complete 9 months. No side effects noted.  Does not miss any doses. \par Received Hep B vaccine in my office x 2 doses. Also received a dose of different Hep B vaccine at dialysis. Completed my series with dose #2. No need to get at dialysis center.  Will check Hep surface ab in future. \par \par Reports intermittent right low back pain at times with standing up. No dysuria. Does not have all the time. No rash at that site.

## 2021-09-07 NOTE — CONSULT LETTER
[Consult Letter:] : I had the pleasure of evaluating your patient, [unfilled]. [Please see my note below.] : Please see my note below. [Consult Closing:] : Thank you very much for allowing me to participate in the care of this patient.  If you have any questions, please do not hesitate to contact me. [Sincerely,] : Sincerely, [DrGeovani  ___] : Dr. LAZAR [FreeTextEntry2] : Dr. Crocker [FreeTextEntry3] : \par Jessica Alvarez MD\par  of Medicine\par Division of Infectious Diseases\par [facility] [employer] [facility] at [facility]\par [address] [place] [address] DrGeovani\[place] [address]\par Tel: [phone]\par Fax: [phone]\par

## 2021-09-07 NOTE — PHYSICAL EXAM
[General Appearance - Alert] : alert [General Appearance - In No Acute Distress] : in no acute distress [Sclera] : the sclera and conjunctiva were normal [PERRL With Normal Accommodation] : pupils were equal in size, round, reactive to light [Extraocular Movements] : extraocular movements were intact [Outer Ear] : the ears and nose were normal in appearance [Oropharynx] : the oropharynx was normal with no thrush [Neck Appearance] : the appearance of the neck was normal [Neck Cervical Mass (___cm)] : no neck mass was observed [Jugular Venous Distention Increased] : there was no jugular-venous distention [] : no respiratory distress [Thyroid Diffuse Enlargement] : the thyroid was not enlarged [Auscultation Breath Sounds / Voice Sounds] : lungs were clear to auscultation bilaterally [Heart Rate And Rhythm] : heart rate was normal and rhythm regular [Abdomen Soft] : soft [Bowel Sounds] : normal bowel sounds [Cervical Lymph Nodes Enlarged Posterior Bilaterally] : posterior cervical [Cervical Lymph Nodes Enlarged Anterior Bilaterally] : anterior cervical [Supraclavicular Lymph Nodes Enlarged Bilaterally] : supraclavicular [No Focal Deficits] : no focal deficits [Skin Lesions] : no skin lesions [Oriented To Time, Place, And Person] : oriented to person, place, and time [Affect] : the affect was normal [FreeTextEntry1] : +murmur [Musculoskeletal - Swelling] : no joint swelling

## 2021-09-08 LAB
ALBUMIN SERPL ELPH-MCNC: 3.6 G/DL — SIGNIFICANT CHANGE UP (ref 3.3–5)
ALP SERPL-CCNC: 95 U/L — SIGNIFICANT CHANGE UP (ref 40–120)
ALT FLD-CCNC: 11 U/L — SIGNIFICANT CHANGE UP (ref 10–45)
ANION GAP SERPL CALC-SCNC: 12 MMOL/L — SIGNIFICANT CHANGE UP (ref 5–17)
AST SERPL-CCNC: 14 U/L — SIGNIFICANT CHANGE UP (ref 10–40)
BILIRUB SERPL-MCNC: 0.5 MG/DL — SIGNIFICANT CHANGE UP (ref 0.2–1.2)
BUN SERPL-MCNC: 26 MG/DL — HIGH (ref 7–23)
CALCIUM SERPL-MCNC: 8.5 MG/DL — SIGNIFICANT CHANGE UP (ref 8.4–10.5)
CHLORIDE SERPL-SCNC: 100 MMOL/L — SIGNIFICANT CHANGE UP (ref 96–108)
CO2 SERPL-SCNC: 29 MMOL/L — SIGNIFICANT CHANGE UP (ref 22–31)
CREAT SERPL-MCNC: 3.66 MG/DL — HIGH (ref 0.5–1.3)
GLUCOSE SERPL-MCNC: 198 MG/DL — HIGH (ref 70–99)
HCT VFR BLD CALC: 34.6 % — LOW (ref 39–50)
HGB BLD-MCNC: 11.2 G/DL — LOW (ref 13–17)
MCHC RBC-ENTMCNC: 30 PG — SIGNIFICANT CHANGE UP (ref 27–34)
MCHC RBC-ENTMCNC: 32.4 GM/DL — SIGNIFICANT CHANGE UP (ref 32–36)
MCV RBC AUTO: 92.8 FL — SIGNIFICANT CHANGE UP (ref 80–100)
PLATELET # BLD AUTO: 243 K/UL — SIGNIFICANT CHANGE UP (ref 150–400)
POTASSIUM SERPL-MCNC: 4.3 MMOL/L — SIGNIFICANT CHANGE UP (ref 3.5–5.3)
POTASSIUM SERPL-SCNC: 4.3 MMOL/L — SIGNIFICANT CHANGE UP (ref 3.5–5.3)
PROT SERPL-MCNC: 6.4 G/DL — SIGNIFICANT CHANGE UP (ref 6–8.3)
RBC # BLD: 3.73 M/UL — LOW (ref 4.2–5.8)
RBC # FLD: 13.7 % — SIGNIFICANT CHANGE UP (ref 10.3–14.5)
SODIUM SERPL-SCNC: 141 MMOL/L — SIGNIFICANT CHANGE UP (ref 135–145)
WBC # BLD: 5.84 K/UL — SIGNIFICANT CHANGE UP (ref 3.8–10.5)
WBC # FLD AUTO: 5.84 K/UL — SIGNIFICANT CHANGE UP (ref 3.8–10.5)

## 2021-09-09 ENCOUNTER — APPOINTMENT (OUTPATIENT)
Dept: TRANSPLANT | Facility: CLINIC | Age: 56
End: 2021-09-09
Payer: MEDICAID

## 2021-09-09 PROCEDURE — 99001T: CUSTOM

## 2021-09-14 ENCOUNTER — NON-APPOINTMENT (OUTPATIENT)
Age: 56
End: 2021-09-14

## 2021-09-14 ENCOUNTER — APPOINTMENT (OUTPATIENT)
Dept: ELECTROPHYSIOLOGY | Facility: CLINIC | Age: 56
End: 2021-09-14
Payer: MEDICAID

## 2021-09-14 VITALS
OXYGEN SATURATION: 97 % | WEIGHT: 240 LBS | HEIGHT: 71 IN | SYSTOLIC BLOOD PRESSURE: 160 MMHG | BODY MASS INDEX: 33.6 KG/M2 | HEART RATE: 81 BPM | DIASTOLIC BLOOD PRESSURE: 80 MMHG | RESPIRATION RATE: 16 BRPM

## 2021-09-14 PROCEDURE — 93000 ELECTROCARDIOGRAM COMPLETE: CPT

## 2021-09-14 PROCEDURE — 99214 OFFICE O/P EST MOD 30 MIN: CPT

## 2021-09-14 NOTE — HISTORY OF PRESENT ILLNESS
[FreeTextEntry1] : SARAH MORFIN is a 56 year old male with hypertension, diabetes mellitus, seizure disorder, ESRD on HD (secondary to Diabetic Nephropathy via AVF), hypertrophic cardiomyopathy with LVOT obstruction and ILR implant who presents for follow up. \par \par To summarize his history, he saw Dr. Johansen in 10/2018 for consideration of ILR implantation for surveillance of occult AF and NSVT as patient had a history of "seizures." He underwent ILR implantation on 12/7/2018. Since then his ILR has demonstrated increasing episodes of AV block which were first noticed in early 2020 including periods of complete heart block, though these occur predominantly at night. Review of rate histograms has also shown progressive bradycardia with Mobitz I block.\par \par He underwent additional work up with cardiac PET and nuclear pyrophosphate scans which were negative for cardiac sarcoid and amyloidosis, respectively. He is currently awaiting renal transplant.\par \par During last follow up the possibility that valproic acid could be contributing to av conduction disturbances was discussed, and patient was referred back to Dr. Chawla for further consideration. Ultimately it was continued due to successful therapy. Sleep study pending for this Saturday.\par \par Since last follow up he reports he has been doing well. Still notes fatigue which he feels is related to HD treatments as he has an improvement in symptoms on Sundays when he has two consecutive days off from HD. Denies lightheadedness, dizziness, near syncope, syncope. ILR has revealed no events since last. ECG in office reveals second degree HB type 1, which resolved while in office. Histograms on ILR reveal average HR 70-80 bpm.

## 2021-09-14 NOTE — REVIEW OF SYSTEMS
[Headache] : no headache [Feeling Fatigued] : feeling fatigued [SOB] : no shortness of breath [Dyspnea on exertion] : not dyspnea during exertion [Chest Discomfort] : no chest discomfort [Palpitations] : no palpitations [Orthopnea] : no orthopnea [Syncope] : no syncope [Dizziness] : no dizziness

## 2021-09-18 ENCOUNTER — APPOINTMENT (OUTPATIENT)
Age: 56
End: 2021-09-18
Payer: MEDICAID

## 2021-09-18 ENCOUNTER — OUTPATIENT (OUTPATIENT)
Dept: OUTPATIENT SERVICES | Facility: HOSPITAL | Age: 56
LOS: 1 days | End: 2021-09-18
Payer: COMMERCIAL

## 2021-09-18 DIAGNOSIS — Z98.890 OTHER SPECIFIED POSTPROCEDURAL STATES: Chronic | ICD-10-CM

## 2021-09-18 DIAGNOSIS — G47.33 OBSTRUCTIVE SLEEP APNEA (ADULT) (PEDIATRIC): ICD-10-CM

## 2021-09-18 DIAGNOSIS — Z92.89 PERSONAL HISTORY OF OTHER MEDICAL TREATMENT: Chronic | ICD-10-CM

## 2021-09-18 PROCEDURE — 95810 POLYSOM 6/> YRS 4/> PARAM: CPT | Mod: 26

## 2021-10-14 ENCOUNTER — APPOINTMENT (OUTPATIENT)
Dept: CARDIOLOGY | Facility: CLINIC | Age: 56
End: 2021-10-14
Payer: MEDICAID

## 2021-10-14 ENCOUNTER — NON-APPOINTMENT (OUTPATIENT)
Age: 56
End: 2021-10-14

## 2021-10-14 VITALS
RESPIRATION RATE: 16 BRPM | DIASTOLIC BLOOD PRESSURE: 78 MMHG | SYSTOLIC BLOOD PRESSURE: 138 MMHG | BODY MASS INDEX: 33.18 KG/M2 | OXYGEN SATURATION: 97 % | WEIGHT: 237 LBS | HEIGHT: 71 IN | HEART RATE: 80 BPM

## 2021-10-14 PROCEDURE — 99214 OFFICE O/P EST MOD 30 MIN: CPT

## 2021-10-14 PROCEDURE — 93000 ELECTROCARDIOGRAM COMPLETE: CPT

## 2021-10-14 NOTE — PHYSICAL EXAM
[General Appearance - In No Acute Distress] : no acute distress [Normal Conjunctiva] : the conjunctiva exhibited no abnormalities [Normal Oral Mucosa] : normal oral mucosa [Normal Oropharynx] : normal oropharynx [Auscultation Breath Sounds / Voice Sounds] : lungs were clear to auscultation bilaterally [Normal Rate] : normal [Rhythm Regular] : regular [Normal S1] : normal S1 [Normal S2] : normal S2 [S4] : an S4 was heard [Abdomen Soft] : soft [Abdomen Tenderness] : non-tender [Abnormal Walk] : normal gait [Nail Clubbing] : no clubbing of the fingernails [Cyanosis, Localized] : no localized cyanosis [Skin Color & Pigmentation] : normal skin color and pigmentation [Oriented To Time, Place, And Person] : oriented to person, place, and time [Affect] : the affect was normal [S3] : no S3 [II] : a grade 2 [FreeTextEntry1] : Trace-1+ bilateral LE edema

## 2021-10-14 NOTE — DISCUSSION/SUMMARY
Addended by: MILA CASTANON on: 3/25/2019 03:52 PM     Modules accepted: Orders    
[FreeTextEntry1] : 1. Continue current cardiac meds in doses as noted above for hypertrophic cardiomyopathy and hypertension and dyslipidemia. \par 2. Dialysis per nephrology.  I will also defer to nephrology with regard to his antihypertensive regimen.\par 3. Monitor BP at home, keep a log and bring to f/u.\par 4. Continue monitoring of loop recorder and looking for additional pauses. No indication for pacemaker at this time. Followup with EP.\par 5. Follow-up with pulmonary for initiation of treatment for sleep apnea with CPAP.\par 6. Follow up with primary doctor for treatment of diabetes. \par 7. Follow up here in 3 months.

## 2021-10-14 NOTE — HISTORY OF PRESENT ILLNESS
[FreeTextEntry1] : Patient presents back to the office today feeling physically well and offering no significant complaints.  He continues to tolerate dialysis without a problem.  He finally had a sleep study performed which showed severe sleep apnea and he needs to follow-up with pulmonary for initiation of treatment.  He has not been checking his blood pressures at home.  Patient denies chest pain, shortness of breath, palpitations, orthopnea, presyncope, syncope.

## 2021-10-14 NOTE — ASSESSMENT
[FreeTextEntry1] : Echocardiogram June 1, 2020 pressures left ventricle normal size and function with ejection fraction of 60-65%. Moderate concentric LVH noted. Moderate mitral regurgitation is noted which is a bit worse than his prior echo.\par \par Nuclear stress test January 28, 2021 was a pharmacologic study which was tolerated well.  Blood pressure was elevated at baseline with a normal response to infusion.  EKG showed horizontal ST depressions in V6.  Mobitz type I heart block was noted through the exam.  Evaluation of nuclear imaging showed no evidence of ischemia or infarct with ejection fraction of 64%.  Moderate LVH was noted.  Left ventricle is moderately dilated.\par \par EKG: Sinus rhythm with long first-degree AV block left axis deviation with probable anterior fascicular block.  Poor R wave progression.  Nonspecific T wave changes.\par \par 56-year-old man with a past medical history of hypertrophic cardiomyopathy, hypertension, diabetes who presents to me for followup evaluation.  Patient appears to be reasonably stable from a cardiac standpoint.  No evidence of significant worsening heart block or CHF at this time.  EKG again shows a long first-degree AV block.  Review of his loop recorder shows occasional pauses but nothing very long and they all appear to be asymptomatic.  There remains no indication for pacemaker placement.  He is to follow-up with nephrology and is continue with dialysis.  He will also follow-up for kidney transplant for which there is no cardiac contraindication.  Blood pressure appears to be reasonably controlled but he should be checking it more closely.  He will follow with dialysis for that as well.  He had a sleep study which showed very severe sleep apnea which likely is causing a significant amount of his overnight pauses and he needs to follow-up to have initiation of CPAP therapy.

## 2021-10-19 ENCOUNTER — OUTPATIENT (OUTPATIENT)
Dept: OUTPATIENT SERVICES | Facility: HOSPITAL | Age: 56
LOS: 1 days | End: 2021-10-19
Payer: MEDICAID

## 2021-10-19 ENCOUNTER — APPOINTMENT (OUTPATIENT)
Dept: TRANSPLANT | Facility: CLINIC | Age: 56
End: 2021-10-19
Payer: MEDICAID

## 2021-10-19 ENCOUNTER — APPOINTMENT (OUTPATIENT)
Dept: INFECTIOUS DISEASE | Facility: CLINIC | Age: 56
End: 2021-10-19
Payer: MEDICAID

## 2021-10-19 VITALS
SYSTOLIC BLOOD PRESSURE: 156 MMHG | HEART RATE: 85 BPM | DIASTOLIC BLOOD PRESSURE: 76 MMHG | TEMPERATURE: 98.6 F | OXYGEN SATURATION: 97 % | HEIGHT: 71 IN | RESPIRATION RATE: 16 BRPM

## 2021-10-19 DIAGNOSIS — Z92.89 PERSONAL HISTORY OF OTHER MEDICAL TREATMENT: Chronic | ICD-10-CM

## 2021-10-19 DIAGNOSIS — B97.89 OTHER VIRAL AGENTS AS THE CAUSE OF DISEASES CLASSIFIED ELSEWHERE: ICD-10-CM

## 2021-10-19 DIAGNOSIS — Z98.890 OTHER SPECIFIED POSTPROCEDURAL STATES: Chronic | ICD-10-CM

## 2021-10-19 PROCEDURE — 99213 OFFICE O/P EST LOW 20 MIN: CPT

## 2021-10-19 PROCEDURE — 99001T: CUSTOM

## 2021-10-19 PROCEDURE — G0463: CPT

## 2021-10-19 NOTE — CONSULT LETTER
[Consult Letter:] : I had the pleasure of evaluating your patient, [unfilled]. [Please see my note below.] : Please see my note below. [Consult Closing:] : Thank you very much for allowing me to participate in the care of this patient.  If you have any questions, please do not hesitate to contact me. [Sincerely,] : Sincerely, [FreeTextEntry2] : Dr. Crocker [FreeTextEntry3] : \par Jessica Alvarez MD\par  of Medicine\par Division of Infectious Diseases\par [facility] [employer] [facility] at [facility]\par [address] [place] [address] DrGeovani\[place] [address]\par Tel: [phone]\par Fax: [phone]\par  [DrGeovani  ___] : Dr. LAZAR

## 2021-10-19 NOTE — ASSESSMENT
[FreeTextEntry1] : 56 M ESRD on HD (MWF), HTN, HLD, DM, seizure history, presents today for Latent TB.\par On work-up for renal transplant found to have positive quantiferon gold.\par \par Consistent with Latent TB.\par On depakote and efficacy can be lowered from INH. D/w neuro and okay to start INH and monitor depakote levels. \par \par Prefer INH over rifampin b/c rifampin interacts with transplant meds in case he were to get transplant.\par \par Now completed  mg po daily with Vit B6 50 mg po daily\par Start date 1/13/2021 - end date 10/13/2021.  Has 4 more pills left and he will complete them.  Informed to take Vitamin B6 for one more month.\par \par Side effects: GI upset, neuropathy, rash, hepatitis, avoid alcohol.  All d/w pt. He understands. No side effects to date.\par Pt is not contagious to others. \par \par Hep B vaccine completed 2/9/2021.\par \par  Last labs stable and LFTs normal. Can hold off on checking labs today. \par Hep B surface ab can be repeated in the future to ensure immunity. \par Can return as needed. \par \par \par

## 2021-10-19 NOTE — PHYSICAL EXAM
[General Appearance - Alert] : alert [General Appearance - In No Acute Distress] : in no acute distress [Sclera] : the sclera and conjunctiva were normal [PERRL With Normal Accommodation] : pupils were equal in size, round, reactive to light [Extraocular Movements] : extraocular movements were intact [Outer Ear] : the ears and nose were normal in appearance [Oropharynx] : the oropharynx was normal with no thrush [Neck Appearance] : the appearance of the neck was normal [Neck Cervical Mass (___cm)] : no neck mass was observed [Jugular Venous Distention Increased] : there was no jugular-venous distention [Thyroid Diffuse Enlargement] : the thyroid was not enlarged [] : no respiratory distress [Auscultation Breath Sounds / Voice Sounds] : lungs were clear to auscultation bilaterally [Heart Rate And Rhythm] : heart rate was normal and rhythm regular [FreeTextEntry1] : +murmur [Bowel Sounds] : normal bowel sounds [Abdomen Soft] : soft [Cervical Lymph Nodes Enlarged Posterior Bilaterally] : posterior cervical [Cervical Lymph Nodes Enlarged Anterior Bilaterally] : anterior cervical [Supraclavicular Lymph Nodes Enlarged Bilaterally] : supraclavicular [Musculoskeletal - Swelling] : no joint swelling [Skin Lesions] : no skin lesions [No Focal Deficits] : no focal deficits [Oriented To Time, Place, And Person] : oriented to person, place, and time [Affect] : the affect was normal

## 2021-10-19 NOTE — HISTORY OF PRESENT ILLNESS
[FreeTextEntry1] : 56 M ESRD on HD (MWF), HTN, HLD, DM, seizure history, presents today for Latent TB.\par On work-up for renal transplant found to have positive quantiferon gold.\par \par ROS: No fevers, no sweats, no chills, no cough, no hemoptysis, no weight loss, denies swollen glands.\par [place] 13 years old.\par Lives with sister.\par No known contacts with Tuberculosis.  \par Never treated for tuberculosis.\par \par Quantiferon gold was positive on 12/22/2020\par Chest xray done 12/8/2020 which has no airspace consolidations and no acute cardiopulmonary pathology.\par Has AVF from 12/16/2020 - RUE. Using now. Catheter removed. \par \par Of note:\par Hep B surface ab negative\par HSV-1: positive. Pt does not get cold sores.\par CMV IgG positive\par Toxoplasma IgG positive\par \par Started INH/B6 1/2021. To complete 9 months. No side effects noted.  Does not miss any doses. \par Received Hep B vaccine in my office x 2 doses. Also received a dose of different Hep B vaccine at dialysis. Completed my series with dose #2. No need to get at dialysis center.  \par \par

## 2021-10-21 ENCOUNTER — APPOINTMENT (OUTPATIENT)
Dept: CARDIOLOGY | Facility: CLINIC | Age: 56
End: 2021-10-21

## 2021-10-21 ENCOUNTER — APPOINTMENT (OUTPATIENT)
Dept: CARDIOLOGY | Facility: CLINIC | Age: 56
End: 2021-10-21
Payer: MEDICAID

## 2021-10-21 DIAGNOSIS — Z22.7 LATENT TUBERCULOSIS: ICD-10-CM

## 2021-10-21 PROCEDURE — 93298 REM INTERROG DEV EVAL SCRMS: CPT

## 2021-10-21 PROCEDURE — G2066: CPT

## 2021-11-04 ENCOUNTER — APPOINTMENT (OUTPATIENT)
Dept: TRANSPLANT | Facility: CLINIC | Age: 56
End: 2021-11-04
Payer: MEDICAID

## 2021-11-04 PROCEDURE — 99001T: CUSTOM

## 2021-11-08 DIAGNOSIS — R06.00 DYSPNEA, UNSPECIFIED: ICD-10-CM

## 2021-11-08 DIAGNOSIS — R06.89 DYSPNEA, UNSPECIFIED: ICD-10-CM

## 2021-11-08 DIAGNOSIS — N28.9 FLUID OVERLOAD, UNSPECIFIED: ICD-10-CM

## 2021-11-08 DIAGNOSIS — E13.29 OTHER SPECIFIED DIABETES MELLITUS WITH OTHER DIABETIC KIDNEY COMPLICATION: ICD-10-CM

## 2021-11-08 DIAGNOSIS — E55.9 VITAMIN D DEFICIENCY, UNSPECIFIED: ICD-10-CM

## 2021-11-08 DIAGNOSIS — E11.49 TYPE 2 DIABETES MELLITUS WITH OTHER DIABETIC NEUROLOGICAL COMPLICATION: ICD-10-CM

## 2021-11-08 DIAGNOSIS — E53.8 DEFICIENCY OF OTHER SPECIFIED B GROUP VITAMINS: ICD-10-CM

## 2021-11-08 DIAGNOSIS — R60.9 EDEMA, UNSPECIFIED: ICD-10-CM

## 2021-11-08 DIAGNOSIS — E87.70 FLUID OVERLOAD, UNSPECIFIED: ICD-10-CM

## 2021-11-08 PROBLEM — Z00.00 ENCOUNTER FOR PREVENTIVE HEALTH EXAMINATION: Noted: 2021-11-08

## 2021-11-29 ENCOUNTER — APPOINTMENT (OUTPATIENT)
Dept: CARDIOLOGY | Facility: CLINIC | Age: 56
End: 2021-11-29
Payer: MEDICAID

## 2021-11-29 PROCEDURE — G2066: CPT

## 2021-11-29 PROCEDURE — 93298 REM INTERROG DEV EVAL SCRMS: CPT

## 2021-12-09 ENCOUNTER — APPOINTMENT (OUTPATIENT)
Dept: NEUROLOGY | Facility: CLINIC | Age: 56
End: 2021-12-09
Payer: MEDICAID

## 2021-12-09 VITALS
BODY MASS INDEX: 29.4 KG/M2 | DIASTOLIC BLOOD PRESSURE: 90 MMHG | SYSTOLIC BLOOD PRESSURE: 160 MMHG | WEIGHT: 210 LBS | HEIGHT: 71 IN

## 2021-12-09 DIAGNOSIS — R56.9 UNSPECIFIED CONVULSIONS: ICD-10-CM

## 2021-12-09 PROCEDURE — 99213 OFFICE O/P EST LOW 20 MIN: CPT
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
## 2021-12-09 NOTE — ASSESSMENT
[FreeTextEntry1] : This is a 56 year-old man who had an episode suggestive of seizure in June of 2018. \par EEG demonstrated a focal spike formation. This was left anterior temporal.\par \par He is currently on Depakote 500 mg twice per day.\par He has been seizure-free on this dosage for more than one year.\par He had recent blood tests in September of 2021 which included a normal WBC count and normal liver enzymes.\par \par I will see him back in 4 months.

## 2021-12-09 NOTE — DATA REVIEWED
[de-identified] : Brain MRI was performed on 6/18/18 at [facility].\par There were some scattered foci of nonspecific gliosis which were unchanged from a prior study in December of 2017.\par  [de-identified] : EEG was performed in the hospital on 6/18/18.\par The study demonstrated some focal slowing over the left anterior temporal region. There was also spike and wave activity in the left frontotemporal region.\par

## 2021-12-13 ENCOUNTER — APPOINTMENT (OUTPATIENT)
Dept: TRANSPLANT | Facility: CLINIC | Age: 56
End: 2021-12-13
Payer: MEDICAID

## 2021-12-13 PROCEDURE — 86832 HLA CLASS I HIGH DEFIN QUAL: CPT

## 2021-12-13 PROCEDURE — 99001T: CUSTOM

## 2021-12-13 PROCEDURE — 86833 HLA CLASS II HIGH DEFIN QUAL: CPT

## 2021-12-28 ENCOUNTER — APPOINTMENT (OUTPATIENT)
Dept: TRANSPLANT | Facility: CLINIC | Age: 56
End: 2021-12-28

## 2021-12-28 ENCOUNTER — NON-APPOINTMENT (OUTPATIENT)
Age: 56
End: 2021-12-28

## 2021-12-28 DIAGNOSIS — K59.09 OTHER CONSTIPATION: ICD-10-CM

## 2021-12-30 ENCOUNTER — APPOINTMENT (OUTPATIENT)
Dept: CARDIOLOGY | Facility: CLINIC | Age: 56
End: 2021-12-30
Payer: MEDICAID

## 2021-12-30 PROCEDURE — G2066: CPT

## 2021-12-30 PROCEDURE — 93298 REM INTERROG DEV EVAL SCRMS: CPT

## 2022-01-07 ENCOUNTER — APPOINTMENT (OUTPATIENT)
Dept: TRANSPLANT | Facility: CLINIC | Age: 57
End: 2022-01-07
Payer: MEDICAID

## 2022-01-07 PROCEDURE — 99001T: CUSTOM

## 2022-02-01 ENCOUNTER — APPOINTMENT (OUTPATIENT)
Dept: NEPHROLOGY | Facility: CLINIC | Age: 57
End: 2022-02-01

## 2022-02-03 ENCOUNTER — APPOINTMENT (OUTPATIENT)
Dept: CARDIOLOGY | Facility: CLINIC | Age: 57
End: 2022-02-03
Payer: MEDICAID

## 2022-02-03 ENCOUNTER — NON-APPOINTMENT (OUTPATIENT)
Age: 57
End: 2022-02-03

## 2022-02-03 VITALS
HEIGHT: 71 IN | RESPIRATION RATE: 16 BRPM | WEIGHT: 244 LBS | SYSTOLIC BLOOD PRESSURE: 178 MMHG | HEART RATE: 67 BPM | DIASTOLIC BLOOD PRESSURE: 80 MMHG | OXYGEN SATURATION: 98 % | BODY MASS INDEX: 34.16 KG/M2

## 2022-02-03 PROCEDURE — G2066: CPT

## 2022-02-03 PROCEDURE — 93298 REM INTERROG DEV EVAL SCRMS: CPT

## 2022-02-03 PROCEDURE — 99214 OFFICE O/P EST MOD 30 MIN: CPT

## 2022-02-03 PROCEDURE — 93000 ELECTROCARDIOGRAM COMPLETE: CPT

## 2022-02-03 RX ORDER — UREA 10 %
50 LOTION (ML) TOPICAL
Qty: 30 | Refills: 2 | Status: DISCONTINUED | COMMUNITY
Start: 2021-01-13 | End: 2022-02-03

## 2022-02-03 RX ORDER — ISONIAZID 300 MG/1
300 TABLET ORAL
Qty: 42 | Refills: 0 | Status: DISCONTINUED | COMMUNITY
Start: 2021-01-13 | End: 2022-02-03

## 2022-02-03 NOTE — DISCUSSION/SUMMARY
[FreeTextEntry1] : 1. Continue current cardiac meds in doses as noted above for hypertrophic cardiomyopathy and hypertension and dyslipidemia. \par 2. Dialysis per nephrology.  I will also defer to nephrology with regard to his antihypertensive regimen.  He will make sure to make an appointment to follow-up with the nephrologist in the office to discuss his blood pressure.\par 3. Monitor BP at home, keep a log and bring to f/u.\par 4. Continue monitoring of loop recorder and looking for additional pauses. No indication for pacemaker at this time. Followup with EP.\par 5. Follow-up with pulmonary for initiation of treatment for sleep apnea with CPAP.\par 6. Follow up with primary doctor for treatment of diabetes. \par 7. Follow up here in 3 months.

## 2022-02-03 NOTE — PHYSICAL EXAM
[General Appearance - In No Acute Distress] : no acute distress [Normal Conjunctiva] : the conjunctiva exhibited no abnormalities [Normal Oral Mucosa] : normal oral mucosa [Normal Oropharynx] : normal oropharynx [Auscultation Breath Sounds / Voice Sounds] : lungs were clear to auscultation bilaterally [Normal Rate] : normal [Rhythm Regular] : regular [Normal S1] : normal S1 [Normal S2] : normal S2 [S4] : an S4 was heard [II] : a grade 2 [Abdomen Soft] : soft [Abdomen Tenderness] : non-tender [Abnormal Walk] : normal gait [Nail Clubbing] : no clubbing of the fingernails [Cyanosis, Localized] : no localized cyanosis [Skin Color & Pigmentation] : normal skin color and pigmentation [Oriented To Time, Place, And Person] : oriented to person, place, and time [Affect] : the affect was normal [S3] : no S3 [FreeTextEntry1] : Trace-1+ bilateral LE edema

## 2022-02-03 NOTE — ASSESSMENT
[FreeTextEntry1] : Echocardiogram June 1, 2020 pressures left ventricle normal size and function with ejection fraction of 60-65%. Moderate concentric LVH noted. Moderate mitral regurgitation is noted which is a bit worse than his prior echo.\par \par Nuclear stress test January 28, 2021 was a pharmacologic study which was tolerated well.  Blood pressure was elevated at baseline with a normal response to infusion.  EKG showed horizontal ST depressions in V6.  Mobitz type I heart block was noted through the exam.  Evaluation of nuclear imaging showed no evidence of ischemia or infarct with ejection fraction of 64%.  Moderate LVH was noted.  Left ventricle is moderately dilated.\par \par EKG: Sinus rhythm with long first-degree AV block and intermittent second-degree type I AV block.  Nonspecific T wave changes.\par \par 57-year-old man with a past medical history of hypertrophic cardiomyopathy, hypertension, diabetes who presents to me for followup evaluation.  Patient appears to be reasonably stable from a cardiac standpoint.  No evidence of significant worsening heart block or CHF at this time.  EKG again shows a long first-degree AV block and possibly some type I second-degree AV block.  Review of his loop recorder shows occasional pauses but nothing very long and they all appear to be asymptomatic.  There remains no indication for pacemaker placement.  He is to follow-up with nephrology and is continue with dialysis.  He also needs to follow-up with them with regards to his blood pressure.  I will make no changes for now but his blood pressure certainly elevated today.  He may need additional medication on the off days but I will defer to nephrology.  He still has yet to follow-up with sleep medicine and I have once again strongly encouraged him to do so.

## 2022-02-03 NOTE — HISTORY OF PRESENT ILLNESS
[FreeTextEntry1] : Patient presents back to the office today feeling well and offering no complaints.  He continues to have no issues tolerating dialysis.  His blood pressures seem to have been a little higher but he was told not to take his medication on the mornings of dialysis.  He has not seen his nephrologist in some time.  He did complete the sleep study but never followed up with sleep medicine regarding initiation of CPAP.  He reports no dizziness or lightheadedness or any other significant symptoms.  His edema has not been an issue.  Patient denies chest pain, shortness of breath, palpitations, orthopnea, presyncope, syncope.

## 2022-02-11 ENCOUNTER — APPOINTMENT (OUTPATIENT)
Dept: TRANSPLANT | Facility: CLINIC | Age: 57
End: 2022-02-11
Payer: MEDICAID

## 2022-02-11 PROCEDURE — 99001T: CUSTOM

## 2022-02-15 ENCOUNTER — APPOINTMENT (OUTPATIENT)
Dept: PULMONOLOGY | Facility: CLINIC | Age: 57
End: 2022-02-15
Payer: MEDICAID

## 2022-02-15 ENCOUNTER — NON-APPOINTMENT (OUTPATIENT)
Age: 57
End: 2022-02-15

## 2022-02-15 VITALS — OXYGEN SATURATION: 97 % | SYSTOLIC BLOOD PRESSURE: 130 MMHG | DIASTOLIC BLOOD PRESSURE: 70 MMHG | HEART RATE: 84 BPM

## 2022-02-15 DIAGNOSIS — G47.31 PRIMARY CENTRAL SLEEP APNEA: ICD-10-CM

## 2022-02-15 PROCEDURE — 99204 OFFICE O/P NEW MOD 45 MIN: CPT

## 2022-02-15 PROCEDURE — 99214 OFFICE O/P EST MOD 30 MIN: CPT

## 2022-02-15 NOTE — ASSESSMENT
[FreeTextEntry1] : Patient has severe sleep apnea which is mixed obstructive and central. This could be related to his cardiac disease. He has a normal ejection fraction. A CPAP titration has been ordered. The patient may require BiPAP with backup rate or ASV, depending on what is seen. I will see him back after that to review the study with him and order the appropriate positive pressure device.

## 2022-02-15 NOTE — PHYSICAL EXAM
[No Acute Distress] : no acute distress [Low Lying Soft Palate] : low lying soft palate [Elongated Uvula] : elongated uvula [Enlarged Base of the Tongue] : enlarged base of the tongue [IV] : Mallampati Class: IV [Normal Appearance] : normal appearance [Neck Circumference: ___] : neck circumference: [unfilled] [No Neck Mass] : no neck mass [Normal Rate/Rhythm] : normal rate/rhythm [Normal S1, S2] : normal s1, s2 [No Murmurs] : no murmurs [No Resp Distress] : no resp distress [Clear to Auscultation Bilaterally] : clear to auscultation bilaterally [No Abnormalities] : no abnormalities [Benign] : benign [Normal Gait] : normal gait [No Clubbing] : no clubbing [No Cyanosis] : no cyanosis [No Edema] : no edema [FROM] : FROM [Normal Color/ Pigmentation] : normal color/ pigmentation [No Focal Deficits] : no focal deficits [Oriented x3] : oriented x3 [Normal Affect] : normal affect

## 2022-02-15 NOTE — HISTORY OF PRESENT ILLNESS
[TextBox_4] : The patient is a 57-year-old male with a history of diabetes, nephrotic syndrome, and renal failure on dialysis. He's had blood pressure that's been more out of control and his wife has noted loud snoring with choking and pauses in his breathing during sleep. His cardiologist sent him for sleep study in September and he never followed up. He has a normal ejection fraction with moderate LVH and hypertrophic cardiomyopathy. He is on dialysis Monday Wednesday and Friday.He reports excessive daytime sleepiness. [Central sleep apnea (CSA)/ Mixed YOSVANY] : central sleep apnea (CSA)/ mixed YOSVANY [TextBox_100] : 9/21 [TextBox_108] : 58 [TextBox_112] : 88 [TextBox_116] : 73 [TextBox_120] : 50% central apneas. Afib noted [TextBox_165] : I reviewed the patient's sleep study with the patient.\par  [ESS] : 18

## 2022-02-28 ENCOUNTER — OUTPATIENT (OUTPATIENT)
Dept: OUTPATIENT SERVICES | Facility: HOSPITAL | Age: 57
LOS: 1 days | End: 2022-02-28
Payer: COMMERCIAL

## 2022-02-28 ENCOUNTER — APPOINTMENT (OUTPATIENT)
Age: 57
End: 2022-02-28
Payer: MEDICAID

## 2022-02-28 DIAGNOSIS — Z92.89 PERSONAL HISTORY OF OTHER MEDICAL TREATMENT: Chronic | ICD-10-CM

## 2022-02-28 DIAGNOSIS — Z98.890 OTHER SPECIFIED POSTPROCEDURAL STATES: Chronic | ICD-10-CM

## 2022-02-28 DIAGNOSIS — G47.33 OBSTRUCTIVE SLEEP APNEA (ADULT) (PEDIATRIC): ICD-10-CM

## 2022-02-28 LAB — SARS-COV-2 N GENE NPH QL NAA+PROBE: NOT DETECTED

## 2022-02-28 PROCEDURE — 95811 POLYSOM 6/>YRS CPAP 4/> PARM: CPT

## 2022-02-28 PROCEDURE — ZZZZZ: CPT

## 2022-03-01 ENCOUNTER — APPOINTMENT (OUTPATIENT)
Dept: NEPHROLOGY | Facility: CLINIC | Age: 57
End: 2022-03-01
Payer: MEDICAID

## 2022-03-01 VITALS
SYSTOLIC BLOOD PRESSURE: 142 MMHG | HEART RATE: 88 BPM | DIASTOLIC BLOOD PRESSURE: 74 MMHG | WEIGHT: 244 LBS | OXYGEN SATURATION: 99 % | BODY MASS INDEX: 34.16 KG/M2 | HEIGHT: 71 IN

## 2022-03-01 PROCEDURE — 99215 OFFICE O/P EST HI 40 MIN: CPT

## 2022-03-01 RX ORDER — PEG-3350, SODIUM SULFATE, SODIUM CHLORIDE, POTASSIUM CHLORIDE, SODIUM ASCORBATE AND ASCORBIC ACID 7.5-2.691G
100 KIT ORAL
Qty: 2 | Refills: 0 | Status: DISCONTINUED | COMMUNITY
Start: 2021-01-05 | End: 2022-03-01

## 2022-03-01 RX ORDER — PYRIDOXINE HCL (VITAMIN B6) 50 MG
50 TABLET ORAL DAILY
Refills: 0 | Status: DISCONTINUED | COMMUNITY
End: 2022-03-01

## 2022-03-01 NOTE — PHYSICAL EXAM
[General Appearance - Alert] : alert [General Appearance - In No Acute Distress] : in no acute distress [Sclera] : the sclera and conjunctiva were normal [Outer Ear] : the ears and nose were normal in appearance [Neck Appearance] : the appearance of the neck was normal [Neck Cervical Mass (___cm)] : no neck mass was observed [Jugular Venous Distention Increased] : there was no jugular-venous distention [Respiration, Rhythm And Depth] : normal respiratory rhythm and effort [Exaggerated Use Of Accessory Muscles For Inspiration] : no accessory muscle use [Auscultation Breath Sounds / Voice Sounds] : lungs were clear to auscultation bilaterally [Apical Impulse] : the apical impulse was normal [Heart Rate And Rhythm] : heart rate was normal and rhythm regular [Heart Sounds] : normal S1 and S2 [Heart Sounds Gallop] : no gallops [Murmurs] : no murmurs [Heart Sounds Pericardial Friction Rub] : no pericardial rub [Arterial Pulses Carotid] : carotid pulses were normal with no bruits [Bowel Sounds] : normal bowel sounds [Abdomen Soft] : soft [Abdomen Tenderness] : non-tender [Abdomen Mass (___ Cm)] : no abdominal mass palpated [Abdomen Hernia] : no hernia was discovered [No CVA Tenderness] : no ~M costovertebral angle tenderness [No Spinal Tenderness] : no spinal tenderness [Nail Clubbing] : no clubbing  or cyanosis of the fingernails [Involuntary Movements] : no involuntary movements were seen [Musculoskeletal - Swelling] : no joint swelling seen [___ (cm) Fistula] : [unfilled] (cm) fistula [Dialysis Catheter] : dialysis catheter [Bruit] : a bruit was present [Thrill] : a thrill was present [Skin Color & Pigmentation] : normal skin color and pigmentation [Skin Lesions] : no skin lesions [FreeTextEntry1] : No ulcers  [No Focal Deficits] : no focal deficits [Oriented To Time, Place, And Person] : oriented to person, place, and time [Impaired Insight] : insight and judgment were intact [Affect] : the affect was normal [Mood] : the mood was normal [] : right dorsalis pedis palpable

## 2022-03-01 NOTE — REASON FOR VISIT
[Follow-Up] : a follow-up visit [Initial] : an initial visit for [Kidney Transplant Evaluation] : kidney transplant evaluation [Other: _____] : [unfilled]

## 2022-03-01 NOTE — REVIEW OF SYSTEMS
[Heart Rate Is Slow] : the heart rate was not slow [Heart Rate Is Fast] : the heart rate was not fast [Leg Claudication] : no intermittent leg claudication [Lower Ext Edema] : lower extremity edema [Negative] : Heme/Lymph [Fever] : no fever [Chills] : no chills [Fatigue] : fatigue [Night Sweats] : no night sweats [Recent Weight Gain (___ Lbs)] : no recent weight gain [Recent Weight Loss (___ Lbs)] : recent [unfilled] ~Ulb weight loss [Sore throat] : no sore throat [Sclera anicteric] : sclera icteric [Double vision] : no double vision [Blurred Vision] : no blurred vision [Wears glasses] : wears glasses [Chest Pain] : no chest pain [Palpitations] : no palpitations [Can Walk (___ Blocks)] : can walk [unfilled] blocks [SOB] : no shortness of breath [Wheezing] : no wheezing [Cough] : no cough [Dyspnea on Exertion] : dyspnea on exertion [Pleuritic Chest Pain] : no pleuritic chest pain [Abdominal Pain] : no abdominal pain [Nausea] : no nausea [Constipation] : no constipation [Diarrhea] : diarrhea [Vomiting] : no vomiting [Dysuria] : no dysuria [Frequency] : no frequency [Urgency] : no urinary urgency [Hematuria] : no hematuria [UTI] : no UTI [Joint Pain] : no joint pain [Joint Stiffness] : no joint stiffness [Muscle Pain] : no muscle pain [Tattoos] : no tattoos [Itching] : no itching [Skin Rash] : no skin rash [Headache] : no headache [Dizziness] : no dizziness [Fainting] : no fainting [Confusion] : no confusion [Memory Loss] : no memory loss [Unsteady Gait] : unsteady gait [Seizures] : seizures [Suicidal] : not suicidal [Hallucinations] : no hallucinations [Anxiety] : no anxiety [Depression] : no depression [Easy Bleeding] : no easy bleeding [Easy Bruising] : no easy bruising
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
## 2022-03-19 ENCOUNTER — APPOINTMENT (OUTPATIENT)
Age: 57
End: 2022-03-19
Payer: MEDICAID

## 2022-03-19 ENCOUNTER — OUTPATIENT (OUTPATIENT)
Dept: OUTPATIENT SERVICES | Facility: HOSPITAL | Age: 57
LOS: 1 days | End: 2022-03-19
Payer: COMMERCIAL

## 2022-03-19 DIAGNOSIS — Z92.89 PERSONAL HISTORY OF OTHER MEDICAL TREATMENT: Chronic | ICD-10-CM

## 2022-03-19 DIAGNOSIS — Z98.890 OTHER SPECIFIED POSTPROCEDURAL STATES: Chronic | ICD-10-CM

## 2022-03-19 DIAGNOSIS — G47.33 OBSTRUCTIVE SLEEP APNEA (ADULT) (PEDIATRIC): ICD-10-CM

## 2022-03-19 PROCEDURE — 95811 POLYSOM 6/>YRS CPAP 4/> PARM: CPT

## 2022-03-19 PROCEDURE — 95811 POLYSOM 6/>YRS CPAP 4/> PARM: CPT | Mod: 26

## 2022-03-22 ENCOUNTER — APPOINTMENT (OUTPATIENT)
Dept: ELECTROPHYSIOLOGY | Facility: CLINIC | Age: 57
End: 2022-03-22
Payer: MEDICAID

## 2022-03-22 ENCOUNTER — NON-APPOINTMENT (OUTPATIENT)
Age: 57
End: 2022-03-22

## 2022-03-22 VITALS
TEMPERATURE: 97.9 F | HEART RATE: 78 BPM | HEIGHT: 71 IN | DIASTOLIC BLOOD PRESSURE: 64 MMHG | OXYGEN SATURATION: 95 % | WEIGHT: 250 LBS | BODY MASS INDEX: 35 KG/M2 | SYSTOLIC BLOOD PRESSURE: 133 MMHG

## 2022-03-22 DIAGNOSIS — I45.5 OTHER SPECIFIED HEART BLOCK: ICD-10-CM

## 2022-03-22 PROCEDURE — 93000 ELECTROCARDIOGRAM COMPLETE: CPT

## 2022-03-22 PROCEDURE — 99214 OFFICE O/P EST MOD 30 MIN: CPT

## 2022-03-22 RX ORDER — INSULIN ASPART 100 [IU]/ML
100 INJECTION, SOLUTION INTRAVENOUS; SUBCUTANEOUS
Refills: 0 | Status: DISCONTINUED | COMMUNITY
Start: 2020-12-08 | End: 2022-03-22

## 2022-03-22 RX ORDER — ERYTHROPOIETIN 40000 [IU]/ML
40000 INJECTION, SOLUTION INTRAVENOUS; SUBCUTANEOUS
Qty: 6 | Refills: 3 | Status: DISCONTINUED | COMMUNITY
Start: 2020-10-15 | End: 2022-03-22

## 2022-03-22 RX ORDER — INSULIN LISPRO 100 [IU]/ML
100 INJECTION, SOLUTION INTRAVENOUS; SUBCUTANEOUS
Refills: 0 | Status: ACTIVE | COMMUNITY

## 2022-03-22 RX ORDER — ASPIRIN 81 MG
81 TABLET, DELAYED RELEASE (ENTERIC COATED) ORAL DAILY
Refills: 0 | Status: ACTIVE | COMMUNITY

## 2022-03-22 RX ORDER — TORSEMIDE 20 MG/1
20 TABLET ORAL DAILY
Qty: 30 | Refills: 1 | Status: DISCONTINUED | COMMUNITY
End: 2022-03-22

## 2022-03-22 RX ORDER — AMLODIPINE BESYLATE 10 MG/1
10 TABLET ORAL DAILY
Qty: 90 | Refills: 1 | Status: DISCONTINUED | COMMUNITY
End: 2022-03-22

## 2022-03-22 NOTE — DISCUSSION/SUMMARY
[FreeTextEntry1] : SARAH MORFIN is a 57 year old male with hypertension, diabetes mellitus, seizure disorder, ESRD on HD (secondary to Diabetic Nephropathy via AVF), hypertrophic cardiomyopathy with LVOT obstruction and ILR implant who presents for follow up. \par \par To summarize his history, he saw Dr. Johansen in 10/2018 for consideration of ILR implantation for surveillance of occult AF and NSVT as patient had a history of "seizures." He underwent ILR implantation on 12/7/2018. Since then his ILR has demonstrated increasing episodes of AV block which were first noticed in early 2020 including periods of complete heart block, though these occur predominantly at night. Review of rate histograms has also shown progressive bradycardia with Mobitz I block.\par \par He underwent additional work up with cardiac PET and nuclear pyrophosphate scans which were negative for cardiac sarcoid and amyloidosis, respectively. He is currently in the process of being evaluated for renal transplant.\par \par During previous follow up the possibility that valproic acid could be contributing to av conduction disturbances was discussed, and patient was referred back to Dr. Chawla for further consideration. Ultimately it was continued due to successful therapy. Sleep study was performed, CPAP recommended, awaiting delivery.\par \par Today, he reports he continues to have fatigue which improves on non-dialysis days and when has two consecutive off days. Occasionally feels lightheadedness upon standing. Denies near syncope, syncope. ECG reveals second degree type 1 heart block c/w prior. ILR has revealed no alessandro events since last follow up. HR trends stable with average HR 70-80bpm. ILR now at RRT. \par \par Recommendation: \par \par - Encouraged him to proceed with YOSVANY treatment as being arranged. ILR now at RRT. We discussed the benefit of ongoing ILR monitoring for progressive bradyarrhythmias given history of known conduction disease and events noted in past. Urged him to contact us if symptoms develop such as  dizziness, near syncope, syncope. He is interested in ongoing monitoring.\par -To proceed with ILR explant and reimplant at [facility]. \par \par Amaris Barrera ANP-C  \par

## 2022-03-22 NOTE — REVIEW OF SYSTEMS
[Headache] : no headache [Feeling Fatigued] : feeling fatigued [SOB] : no shortness of breath [Dyspnea on exertion] : not dyspnea during exertion [Chest Discomfort] : no chest discomfort [Lower Ext Edema] : no extremity edema [Palpitations] : no palpitations [Orthopnea] : no orthopnea [Syncope] : no syncope [de-identified] : Lightheaded

## 2022-03-22 NOTE — HISTORY OF PRESENT ILLNESS
[FreeTextEntry1] : SARAH MORFIN is a 57 year old male with hypertension, diabetes mellitus, seizure disorder, ESRD on HD (secondary to Diabetic Nephropathy via AVF), hypertrophic cardiomyopathy with LVOT obstruction and ILR implant who presents for follow up. \par \par To summarize his history, he saw Dr. Johansen in 10/2018 for consideration of ILR implantation for surveillance of occult AF and NSVT as patient had a history of "seizures." He underwent ILR implantation on 12/7/2018. Since then his ILR has demonstrated increasing episodes of AV block which were first noticed in early 2020 including periods of complete heart block, though these occur predominantly at night. Review of rate histograms has also shown progressive bradycardia with Mobitz I block.\par \par He underwent additional work up with cardiac PET and nuclear pyrophosphate scans which were negative for cardiac sarcoid and amyloidosis, respectively. He is currently in the process of being evaluated for renal transplant.\par \par During previous follow up the possibility that valproic acid could be contributing to av conduction disturbances was discussed, and patient was referred back to Dr. Chawla for further consideration. Ultimately it was continued due to successful therapy. Sleep study was performed, CPAP recommended, awaiting delivery.\par \par Today, he reports he continues to have fatigue which improves on non-dialysis days and when has two consecutive off days. Occasionally feels lightheadedness upon standing. Denies near syncope, syncope. ECG reveals second degree type 1 heart block c/w prior. ILR has revealed no alessandro events since last follow up. HR trends stable with average HR 70-80bpm. ILR now at RRT. \par  \par

## 2022-04-14 ENCOUNTER — APPOINTMENT (OUTPATIENT)
Dept: NEUROLOGY | Facility: CLINIC | Age: 57
End: 2022-04-14
Payer: MEDICAID

## 2022-04-14 VITALS
DIASTOLIC BLOOD PRESSURE: 80 MMHG | BODY MASS INDEX: 35 KG/M2 | HEIGHT: 71 IN | WEIGHT: 250 LBS | SYSTOLIC BLOOD PRESSURE: 158 MMHG

## 2022-04-14 PROCEDURE — 99213 OFFICE O/P EST LOW 20 MIN: CPT

## 2022-04-14 NOTE — ASSESSMENT
[FreeTextEntry1] : This is a 57 year-old man who had an episode suggestive of seizure in June of 2018. \par EEG demonstrated a focal spike formation. This was left anterior temporal.\par \par He is currently on Depakote 500 mg twice per day.\par He has been seizure-free on this dosage for more than one year.\par He had recent blood tests in September of 2021 which included a normal WBC count and normal liver enzymes.\par \par I will see him back in 6 months.

## 2022-04-14 NOTE — DATA REVIEWED
[de-identified] : Brain MRI was performed on 6/18/18 at [facility].\par There were some scattered foci of nonspecific gliosis which were unchanged from a prior study in December of 2017.\par  [de-identified] : EEG was performed in the hospital on 6/18/18.\par The study demonstrated some focal slowing over the left anterior temporal region. There was also spike and wave activity in the left frontotemporal region.\par
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
## 2022-04-20 ENCOUNTER — TRANSCRIPTION ENCOUNTER (OUTPATIENT)
Age: 57
End: 2022-04-20

## 2022-04-20 ENCOUNTER — OUTPATIENT (OUTPATIENT)
Dept: OUTPATIENT SERVICES | Facility: HOSPITAL | Age: 57
LOS: 1 days | Discharge: HOME | End: 2022-04-20
Payer: COMMERCIAL

## 2022-04-20 VITALS
SYSTOLIC BLOOD PRESSURE: 169 MMHG | TEMPERATURE: 98 F | DIASTOLIC BLOOD PRESSURE: 78 MMHG | HEART RATE: 80 BPM | RESPIRATION RATE: 18 BRPM | OXYGEN SATURATION: 99 %

## 2022-04-20 VITALS
OXYGEN SATURATION: 98 % | HEIGHT: 71 IN | HEART RATE: 71 BPM | DIASTOLIC BLOOD PRESSURE: 97 MMHG | SYSTOLIC BLOOD PRESSURE: 167 MMHG | RESPIRATION RATE: 18 BRPM | WEIGHT: 240.08 LBS

## 2022-04-20 DIAGNOSIS — Z92.89 PERSONAL HISTORY OF OTHER MEDICAL TREATMENT: Chronic | ICD-10-CM

## 2022-04-20 DIAGNOSIS — Z98.890 OTHER SPECIFIED POSTPROCEDURAL STATES: Chronic | ICD-10-CM

## 2022-04-20 DIAGNOSIS — I44.2 ATRIOVENTRICULAR BLOCK, COMPLETE: ICD-10-CM

## 2022-04-20 LAB — GLUCOSE BLDC GLUCOMTR-MCNC: 115 MG/DL — HIGH (ref 70–99)

## 2022-04-20 PROCEDURE — C1764: CPT

## 2022-04-20 PROCEDURE — 33286 RMVL SUBQ CAR RHYTHM MNTR: CPT | Mod: 59

## 2022-04-20 PROCEDURE — 33285 INSJ SUBQ CAR RHYTHM MNTR: CPT

## 2022-04-20 PROCEDURE — 82962 GLUCOSE BLOOD TEST: CPT

## 2022-04-20 RX ORDER — INSULIN GLARGINE 100 [IU]/ML
10 INJECTION, SOLUTION SUBCUTANEOUS
Qty: 0 | Refills: 0 | DISCHARGE

## 2022-04-20 RX ORDER — DOXAZOSIN MESYLATE 4 MG
1 TABLET ORAL
Qty: 0 | Refills: 0 | DISCHARGE

## 2022-04-20 RX ORDER — CALCIUM ACETATE 667 MG
0 TABLET ORAL
Qty: 0 | Refills: 0 | DISCHARGE

## 2022-04-20 RX ORDER — ATORVASTATIN CALCIUM 80 MG/1
1 TABLET, FILM COATED ORAL
Qty: 0 | Refills: 0 | DISCHARGE

## 2022-04-20 RX ORDER — ASPIRIN/CALCIUM CARB/MAGNESIUM 324 MG
1 TABLET ORAL
Qty: 0 | Refills: 0 | DISCHARGE

## 2022-04-20 RX ORDER — AMLODIPINE BESYLATE 2.5 MG/1
1 TABLET ORAL
Qty: 0 | Refills: 0 | DISCHARGE

## 2022-04-20 RX ORDER — INSULIN LISPRO 100/ML
0 VIAL (ML) SUBCUTANEOUS
Qty: 0 | Refills: 0 | DISCHARGE

## 2022-04-20 RX ORDER — CEPHALEXIN 500 MG
500 CAPSULE ORAL ONCE
Refills: 0 | Status: COMPLETED | OUTPATIENT
Start: 2022-04-20 | End: 2022-04-20

## 2022-04-20 RX ORDER — LATANOPROST 0.05 MG/ML
0 SOLUTION/ DROPS OPHTHALMIC; TOPICAL
Qty: 0 | Refills: 0 | DISCHARGE

## 2022-04-20 RX ORDER — ERGOCALCIFEROL 1.25 MG/1
1 CAPSULE ORAL
Qty: 0 | Refills: 0 | DISCHARGE

## 2022-04-20 RX ORDER — PYRIDOXINE HCL (VITAMIN B6) 100 MG
1 TABLET ORAL
Qty: 0 | Refills: 0 | DISCHARGE

## 2022-04-20 RX ORDER — HEXAVITAMINS
1 TABLET ORAL
Qty: 0 | Refills: 0 | DISCHARGE

## 2022-04-20 RX ORDER — DIVALPROEX SODIUM 500 MG/1
1 TABLET, DELAYED RELEASE ORAL
Qty: 0 | Refills: 0 | DISCHARGE

## 2022-04-20 RX ADMIN — Medication 500 MILLIGRAM(S): at 10:27

## 2022-04-20 NOTE — PROGRESS NOTE ADULT - SUBJECTIVE AND OBJECTIVE BOX
Procedure: Loop recorder exchange     Electrophysiologist: Josefina Parker MD    Pt doing well s/p loop recorder explant. Denies any complaint post procedure.     Incision: Dermabond site C/D/I; no bleeding, hematoma, erythema, exudate or edema    Plan:   Resume PO intake.   Ambulate w/ assist, then progress as tolerated.     Pain control with PO analgesia PRN.   Resume home medications.   Pt to report immediately to outpatient dialysis at time of discharge, pt has confirmed transportation  D/C home once all criteria met, with outpt f/up in 1-2 weeks.

## 2022-04-20 NOTE — ASU DISCHARGE PLAN (ADULT/PEDIATRIC) - CARE PROVIDER_API CALL
Manjeet Montaño)  Cardiac Electrophysiology; Cardiology  [address]  [place] [address]  Phone: [phone]  Fax: [phone]  Follow Up Time:     Tomas Tran)  [facility]  [address] [place] [address]  Phone: [phone]  Fax: [phone]  Follow Up Time:

## 2022-04-20 NOTE — ASU DISCHARGE PLAN (ADULT/PEDIATRIC) - NS MD DC FALL RISK RISK
For information on Fall & Injury Prevention, visit: [email or web address] OR  [email or web address] OR  https://www.cdc.gov/steadi/patient.html

## 2022-04-20 NOTE — DISCHARGE NOTE NURSING/CASE MANAGEMENT/SOCIAL WORK - PATIENT PORTAL LINK FT
You can access the FollowMyHealth Patient Portal offered by [facility] by registering at the following website: [email or web address]. By joining [facility]’s FollowMyHealth portal, you will also be able to view your health information using other applications (apps) compatible with our system.

## 2022-04-20 NOTE — ASU DISCHARGE PLAN (ADULT/PEDIATRIC) - COMMENTS
Follow up with Dr. Montaño next week for device check. Our office will contact you in 3-5 days to schedule this appointment. Please call [phone] with questions or concerns.

## 2022-04-20 NOTE — H&P PST ADULT - NSICDXPASTMEDICALHX_GEN_ALL_CORE_FT
PAST MEDICAL HISTORY:  1st degree AV block     Bilateral lower extremity edema     Chronic renal disease     Diabetes type 2    End stage renal disease     ESRD on hemodialysis right ACW tunneled catheter, HD M/W/F    History of loop recorder implented 12/7/2018 Med[employer] model LNQ11 secondary to vertigo/syncope/seizure episode    History of seizure disorder     HTN (hypertension)     Mitral regurgitation     TB lung, latent on INH 1/13/2021-10/13/2021

## 2022-04-20 NOTE — H&P PST ADULT - ASSESSMENT
56 y/o m, PMHx of hypertension, diabetes mellitus, seizure disorder, ESRD (HD M/W/F), hypertrophic cardiomyopathy with LVOT, loop recorder implantation  ( Dr. Johansen 12/7/2018) for surveillance of occult AF and NSVT as patient had a history of "seizures", presents today for elective loop recorder exchange with Dr. Montaño. Since implantation his ILR has demonstrated increasing episodes of AV block which were first noticed in early 2020 including periods of complete heart block, though these occur predominantly at night. Review of rate histograms has also shown progressive bradycardia with Mobitz I block. Pt underwent additional work up with cardiac PET and nuclear pyrophosphate scans which were negative for cardiac sarcoid and amyloidosis, respectively. Pts device has reached RRT and pt has agreed to reimplantation for further longitudinal monitoring of arrhythmias. Pt reports no complaints at time of arrival to [facility]. Denies dizziness, SOB, palpitations, and chest pain. COVID - 19 PCR negative 4/17/22.    Plan  1) Loop recorder exchange  - Consent with attending  - Keflex 500mg prior to procedure  - Pt with Mobitz 1 on telemetry (asymptomatic), discussed with Dr. Montaño, similar to outpatient findings   - Dr. Parker to perform procedure as pt has pending outpatient dialysis appointment scheduled, pt agreeable.

## 2022-04-25 DIAGNOSIS — T82.111A BREAKDOWN (MECHANICAL) OF CARDIAC PULSE GENERATOR (BATTERY), INITIAL ENCOUNTER: ICD-10-CM

## 2022-05-03 ENCOUNTER — APPOINTMENT (OUTPATIENT)
Dept: CARDIOLOGY | Facility: CLINIC | Age: 57
End: 2022-05-03

## 2022-05-23 ENCOUNTER — NON-APPOINTMENT (OUTPATIENT)
Age: 57
End: 2022-05-23

## 2022-05-23 ENCOUNTER — APPOINTMENT (OUTPATIENT)
Dept: ELECTROPHYSIOLOGY | Facility: CLINIC | Age: 57
End: 2022-05-23
Payer: MEDICAID

## 2022-05-23 PROCEDURE — 93298 REM INTERROG DEV EVAL SCRMS: CPT

## 2022-05-23 PROCEDURE — G2066: CPT

## 2022-05-24 ENCOUNTER — APPOINTMENT (OUTPATIENT)
Dept: CARDIOLOGY | Facility: CLINIC | Age: 57
End: 2022-05-24
Payer: MEDICAID

## 2022-05-24 ENCOUNTER — NON-APPOINTMENT (OUTPATIENT)
Age: 57
End: 2022-05-24

## 2022-05-24 VITALS
SYSTOLIC BLOOD PRESSURE: 146 MMHG | OXYGEN SATURATION: 96 % | BODY MASS INDEX: 34.86 KG/M2 | HEIGHT: 71 IN | RESPIRATION RATE: 16 BRPM | WEIGHT: 249 LBS | DIASTOLIC BLOOD PRESSURE: 76 MMHG | HEART RATE: 64 BPM

## 2022-05-24 DIAGNOSIS — E78.5 HYPERLIPIDEMIA, UNSPECIFIED: ICD-10-CM

## 2022-05-24 PROCEDURE — 93000 ELECTROCARDIOGRAM COMPLETE: CPT

## 2022-05-24 PROCEDURE — 99214 OFFICE O/P EST MOD 30 MIN: CPT | Mod: 25

## 2022-05-24 NOTE — HISTORY OF PRESENT ILLNESS
[FreeTextEntry1] : Patient presents back to the office today feeling fairly well.  He says he still has a lot of issues with being fatigued after dialysis and also even on the nondialysis days.  This is limited his ability to be active and exercise.  He feels better on the weekends but still does not exercise very much.  He reports no symptoms within his normal daily activities.  He is otherwise tolerating dialysis well.  He is still on the kidney transplant list.  His edema has been well controlled.  Patient denies chest pain, shortness of breath, palpitations, orthopnea, presyncope, syncope.

## 2022-05-24 NOTE — DISCUSSION/SUMMARY
[FreeTextEntry1] : 1. Check echocardiogram to reevaluate his hypertrophic myopathy.  \par 2. Continue current cardiac meds in doses as noted above for hypertrophic cardiomyopathy and hypertension and dyslipidemia. \par 3. Dialysis per nephrology.  I will also defer to nephrology with regard to his antihypertensive regimen.\par 4. Monitor BP at home, keep a log and bring to f/u.\par 5. Continue monitoring of loop recorder and looking for additional pauses. No indication for pacemaker at this time. Followup with EP.\par 6. Follow-up with pulmonary for initiation of treatment for sleep apnea with CPAP.\par 7. Follow up with primary doctor for treatment of diabetes. \par 8. He will have blood work done.\par 9. Follow up here in 3 months.

## 2022-05-24 NOTE — ASSESSMENT
[FreeTextEntry1] : Echocardiogram June 1, 2020 pressures left ventricle normal size and function with ejection fraction of 60-65%. Moderate concentric LVH noted. Moderate mitral regurgitation is noted which is a bit worse than his prior echo.\par \par Nuclear stress test January 28, 2021 was a pharmacologic study which was tolerated well.  Blood pressure was elevated at baseline with a normal response to infusion.  EKG showed horizontal ST depressions in V6.  Mobitz type I heart block was noted through the exam.  Evaluation of nuclear imaging showed no evidence of ischemia or infarct with ejection fraction of 64%.  Moderate LVH was noted.  Left ventricle is moderately dilated.\par \par EKG: Sinus rhythm with variable AV block with LAFB.  Poor R wave progression.  Nonspecific T wave changes.\par \par 57-year-old man with a past medical history of hypertrophic cardiomyopathy, hypertension, diabetes who presents to me for followup evaluation.  Patient appears to be reasonably stable from a cardiac standpoint.  No evidence of significant worsening heart block or CHF at this time.  EKG today again shows some variable AV block but he remains asymptomatic.  Review of his loop recorder shows occasional pauses but nothing very long and they all appear to be asymptomatic.  He recently had the device replaced.  There remains no indication for pacemaker placement.  Blood pressure appears to be reasonably controlled and will be followed by his nephrologist through dialysis.  He is continuing dialysis and remains on the transplant list.

## 2022-06-07 ENCOUNTER — APPOINTMENT (OUTPATIENT)
Dept: NEPHROLOGY | Facility: CLINIC | Age: 57
End: 2022-06-07
Payer: MEDICAID

## 2022-06-07 VITALS
HEART RATE: 72 BPM | OXYGEN SATURATION: 96 % | HEIGHT: 71 IN | WEIGHT: 248 LBS | SYSTOLIC BLOOD PRESSURE: 146 MMHG | BODY MASS INDEX: 34.72 KG/M2 | DIASTOLIC BLOOD PRESSURE: 84 MMHG

## 2022-06-07 PROBLEM — K59.09 CONSTIPATION, CHRONIC: Status: ACTIVE | Noted: 2022-06-07

## 2022-06-07 PROCEDURE — 99215 OFFICE O/P EST HI 40 MIN: CPT

## 2022-06-07 RX ORDER — CALCIUM ACETATE 667 MG/1
667 TABLET ORAL 3 TIMES DAILY
Qty: 540 | Refills: 3 | Status: ACTIVE | COMMUNITY
Start: 1900-01-01 | End: 1900-01-01

## 2022-06-07 RX ORDER — DOCUSATE SODIUM 100 MG/1
100 CAPSULE ORAL 3 TIMES DAILY
Qty: 90 | Refills: 3 | Status: ACTIVE | COMMUNITY
Start: 2022-06-07 | End: 1900-01-01

## 2022-06-07 RX ORDER — AMLODIPINE BESYLATE 5 MG/1
5 TABLET ORAL DAILY
Refills: 0 | Status: DISCONTINUED | COMMUNITY
End: 2022-06-07
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
## 2022-06-16 ENCOUNTER — NON-APPOINTMENT (OUTPATIENT)
Age: 57
End: 2022-06-16

## 2022-06-23 ENCOUNTER — NON-APPOINTMENT (OUTPATIENT)
Age: 57
End: 2022-06-23

## 2022-06-27 ENCOUNTER — APPOINTMENT (OUTPATIENT)
Dept: ELECTROPHYSIOLOGY | Facility: CLINIC | Age: 57
End: 2022-06-27
Payer: MEDICAID

## 2022-06-27 ENCOUNTER — NON-APPOINTMENT (OUTPATIENT)
Age: 57
End: 2022-06-27

## 2022-06-27 PROCEDURE — 93298 REM INTERROG DEV EVAL SCRMS: CPT

## 2022-06-27 PROCEDURE — G2066: CPT

## 2022-07-28 ENCOUNTER — APPOINTMENT (OUTPATIENT)
Dept: NEPHROLOGY | Facility: CLINIC | Age: 57
End: 2022-07-28

## 2022-07-28 ENCOUNTER — NON-APPOINTMENT (OUTPATIENT)
Age: 57
End: 2022-07-28

## 2022-07-28 VITALS
RESPIRATION RATE: 16 BRPM | DIASTOLIC BLOOD PRESSURE: 77 MMHG | BODY MASS INDEX: 34.03 KG/M2 | HEART RATE: 80 BPM | OXYGEN SATURATION: 98 % | SYSTOLIC BLOOD PRESSURE: 138 MMHG | TEMPERATURE: 97.7 F | WEIGHT: 244 LBS

## 2022-07-28 PROCEDURE — 99215 OFFICE O/P EST HI 40 MIN: CPT

## 2022-07-28 PROCEDURE — 99072 ADDL SUPL MATRL&STAF TM PHE: CPT

## 2022-07-28 NOTE — PHYSICAL EXAM
[General Appearance - Alert] : alert [General Appearance - In No Acute Distress] : in no acute distress [Sclera] : the sclera and conjunctiva were normal [Neck Cervical Mass (___cm)] : no neck mass was observed [Jugular Venous Distention Increased] : there was no jugular-venous distention [Respiration, Rhythm And Depth] : normal respiratory rhythm and effort [Exaggerated Use Of Accessory Muscles For Inspiration] : no accessory muscle use [Auscultation Breath Sounds / Voice Sounds] : lungs were clear to auscultation bilaterally [Apical Impulse] : the apical impulse was normal [Heart Rate And Rhythm] : heart rate was normal and rhythm regular [Heart Sounds] : normal S1 and S2 [Heart Sounds Gallop] : no gallops [Heart Sounds Pericardial Friction Rub] : no pericardial rub [Arterial Pulses Carotid] : carotid pulses were normal with no bruits [Edema] : there was no peripheral edema [Bowel Sounds] : normal bowel sounds [Abdomen Soft] : soft [Abdomen Tenderness] : non-tender [Abdomen Mass (___ Cm)] : no abdominal mass palpated [Abdomen Hernia] : no hernia was discovered [No CVA Tenderness] : no ~M costovertebral angle tenderness [No Spinal Tenderness] : no spinal tenderness [Nail Clubbing] : no clubbing  or cyanosis of the fingernails [Involuntary Movements] : no involuntary movements were seen [Musculoskeletal - Swelling] : no joint swelling seen [___ (cm) Fistula] : [unfilled] (cm) fistula [Bruit] : a bruit was present [Thrill] : a thrill was present [Skin Lesions] : no skin lesions [FreeTextEntry1] : No ulcers  [No Focal Deficits] : no focal deficits [Oriented To Time, Place, And Person] : oriented to person, place, and time [Impaired Insight] : insight and judgment were intact [Affect] : the affect was normal [Mood] : the mood was normal [] : right dorsalis pedis palpable
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
## 2022-07-28 NOTE — ASSESSMENT
[FreeTextEntry1] : .Mr. MORFIN 57 year He is evaluated for kidney transplantation. He is listed  and this is a follow up visit.\par Pre transplant/ESRD: Patient will benefit from renal allotransplantation he is an acceptable  risk candidate, diabetes,   PVD.\par Medical risks: Cardiovascular, cancer screening.\par Diabetes Mellitus: Discussed implications. Continue follow up with primary physicians\par Hypertension: Discussed implications. Continue follow up with primary physicians.\par Cardiac risk:  will get updated evaluation; echo, stress test; Reviewed cardiovascular risk reduction strategies\par Cancer screening: PSA- update .  Colon graciela screening noted. No known h/o neoplastic disease\par ID: Serology for acute and chronic viral infections- will update. Screening for latent TB.- noted prior treatment.\par Imaging: Renal/abdominal /chest /Iliac  imaging \par Seizures: Controlled. Last seizure in 2018. He is on f/u with neurologist.\par Consults: Nutrition, social work, cardiology, Transplant surgery.\par Reviewed factors affecting survival and morbidity while on wait list and reviewed shaheed-operative and long-term risk factors affecting outcome in kidney transplantation.\par Details of transplant surgery, immunosuppression and its complications and benefits of live donor transplantation as well as variability in wait times across regions and multiple listing were discussed. KDPI >85% and PHS high risk criteria donors were discussed. Discussed factors affecting morbidity and mortality while on hemodialysis.\par Current outcome for [facility] kidney transplant program and SRTR data were discussed. He has informative educational video and power point presentation regarding details of kidney transplantation at this center.\par Patient has potential live donor ( none ) at present. \par Will proceed with updating work up while listed for transplant and remain listed for transplant once work up is reviewed and found to be ok.\par

## 2022-08-01 ENCOUNTER — NON-APPOINTMENT (OUTPATIENT)
Age: 57
End: 2022-08-01

## 2022-08-02 LAB
ABO + RH PNL BLD: NORMAL
ALBUMIN SERPL ELPH-MCNC: 3.9 G/DL
ALP BLD-CCNC: 70 U/L
ALT SERPL-CCNC: 10 U/L
ANION GAP SERPL CALC-SCNC: 13 MMOL/L
AST SERPL-CCNC: 12 U/L
BASOPHILS # BLD AUTO: 0.05 K/UL
BASOPHILS NFR BLD AUTO: 0.8 %
BILIRUB SERPL-MCNC: 0.4 MG/DL
BUN SERPL-MCNC: 31 MG/DL
C PEPTIDE SERPL-MCNC: 11.8 NG/ML
CALCIUM SERPL-MCNC: 9 MG/DL
CHLORIDE SERPL-SCNC: 97 MMOL/L
CHOLEST SERPL-MCNC: 112 MG/DL
CK SERPL-CCNC: 162 U/L
CMV IGG SERPL QL: 4.7 U/ML
CMV IGG SERPL-IMP: POSITIVE
CO2 SERPL-SCNC: 29 MMOL/L
COVID-19 SPIKE DOMAIN ANTIBODY INTERPRETATION: POSITIVE
CREAT SERPL-MCNC: 4.82 MG/DL
CRP SERPL-MCNC: 11 MG/L
EBV EA AB SER IA-ACNC: <5 U/ML
EBV EA AB TITR SER IF: POSITIVE
EBV EA IGG SER QL IA: 373 U/ML
EBV EA IGG SER-ACNC: NEGATIVE
EBV EA IGM SER IA-ACNC: NEGATIVE
EBV PATRN SPEC IB-IMP: NORMAL
EBV VCA IGG SER IA-ACNC: >750 U/ML
EBV VCA IGM SER QL IA: <10 U/ML
EGFR: 13 ML/MIN/1.73M2
EOSINOPHIL # BLD AUTO: 0.2 K/UL
EOSINOPHIL NFR BLD AUTO: 3.2 %
EPSTEIN-BARR VIRUS CAPSID ANTIGEN IGG: POSITIVE
ERYTHROCYTE [SEDIMENTATION RATE] IN BLOOD BY WESTERGREN METHOD: 29 MM/HR
ESTIMATED AVERAGE GLUCOSE: 146 MG/DL
GLUCOSE SERPL-MCNC: 147 MG/DL
HAV IGM SER QL: NONREACTIVE
HBA1C MFR BLD HPLC: 6.7 %
HBV CORE IGG+IGM SER QL: NONREACTIVE
HBV SURFACE AB SER QL: REACTIVE
HBV SURFACE AB SERPL IA-ACNC: 12.7 MIU/ML
HBV SURFACE AG SER QL: NONREACTIVE
HCT VFR BLD CALC: 32.1 %
HCV AB SER QL: NONREACTIVE
HCV S/CO RATIO: 0.13 S/CO
HDLC SERPL-MCNC: 24 MG/DL
HEPATITIS A IGG ANTIBODY: REACTIVE
HGB BLD-MCNC: 11.1 G/DL
HIV1+2 AB SPEC QL IA.RAPID: NONREACTIVE
HSV 1+2 IGG SER IA-IMP: NEGATIVE
HSV 1+2 IGG SER IA-IMP: POSITIVE
HSV 1+2 IGG SER IA-IMP: POSITIVE
HSV1 IGG SER QL: 53.4 INDEX
HSV1 IGG SER QL: 53.4 INDEX
HSV2 IGG SER QL: 0.19 INDEX
IMM GRANULOCYTES NFR BLD AUTO: 0.6 %
LDLC SERPL CALC-MCNC: 54 MG/DL
LYMPHOCYTES # BLD AUTO: 1.84 K/UL
LYMPHOCYTES NFR BLD AUTO: 29.6 %
M TB IFN-G BLD-IMP: POSITIVE
MAGNESIUM SERPL-MCNC: 1.9 MG/DL
MAN DIFF?: NORMAL
MCHC RBC-ENTMCNC: 29.3 PG
MCHC RBC-ENTMCNC: 34.6 GM/DL
MCV RBC AUTO: 84.7 FL
MONOCYTES # BLD AUTO: 0.46 K/UL
MONOCYTES NFR BLD AUTO: 7.4 %
NEUTROPHILS # BLD AUTO: 3.62 K/UL
NEUTROPHILS NFR BLD AUTO: 58.4 %
NONHDLC SERPL-MCNC: 88 MG/DL
PHOSPHATE SERPL-MCNC: 4 MG/DL
PLATELET # BLD AUTO: 206 K/UL
POTASSIUM SERPL-SCNC: 4.5 MMOL/L
PROT SERPL-MCNC: 6.2 G/DL
PSA SERPL-MCNC: 0.6 NG/ML
QUANTIFERON TB PLUS MITOGEN MINUS NIL: >10 IU/ML
QUANTIFERON TB PLUS NIL: 0.05 IU/ML
QUANTIFERON TB PLUS TB1 MINUS NIL: 1.73 IU/ML
QUANTIFERON TB PLUS TB2 MINUS NIL: 1.73 IU/ML
RBC # BLD: 3.79 M/UL
RBC # FLD: 13.7 %
RUBV IGG FLD-ACNC: 2.9 INDEX
RUBV IGG SER-IMP: POSITIVE
SARS-COV-2 AB SERPL IA-ACNC: >250 U/ML
SARS-COV-2 N GENE NPH QL NAA+PROBE: NOT DETECTED
SODIUM SERPL-SCNC: 139 MMOL/L
T GONDII AB SER-IMP: POSITIVE
T GONDII IGG SER QL: 121 IU/ML
T PALLIDUM AB SER QL IA: NEGATIVE
T3 SERPL-MCNC: 169 NG/DL
T4 FREE SERPL-MCNC: 1.3 NG/DL
TRIGL SERPL-MCNC: 168 MG/DL
TSH SERPL-ACNC: 1.96 UIU/ML
URATE SERPL-MCNC: 4.1 MG/DL
VZV AB TITR SER: POSITIVE
VZV IGG SER IF-ACNC: 1199 INDEX
WBC # FLD AUTO: 6.21 K/UL

## 2022-08-09 LAB
HSV1 IGM SER QL: NEGATIVE
HSV2 AB FLD-ACNC: NEGATIVE

## 2022-08-15 ENCOUNTER — APPOINTMENT (OUTPATIENT)
Dept: ELECTROPHYSIOLOGY | Facility: CLINIC | Age: 57
End: 2022-08-15

## 2022-08-16 ENCOUNTER — APPOINTMENT (OUTPATIENT)
Dept: RADIOLOGY | Facility: CLINIC | Age: 57
End: 2022-08-16

## 2022-08-16 ENCOUNTER — APPOINTMENT (OUTPATIENT)
Dept: CT IMAGING | Facility: CLINIC | Age: 57
End: 2022-08-16

## 2022-08-16 PROCEDURE — 71046 X-RAY EXAM CHEST 2 VIEWS: CPT

## 2022-08-16 PROCEDURE — 74178 CT ABD&PLV WO CNTR FLWD CNTR: CPT

## 2022-09-01 ENCOUNTER — APPOINTMENT (OUTPATIENT)
Dept: CARDIOLOGY | Facility: CLINIC | Age: 57
End: 2022-09-01

## 2022-09-06 ENCOUNTER — APPOINTMENT (OUTPATIENT)
Dept: CARDIOLOGY | Facility: CLINIC | Age: 57
End: 2022-09-06

## 2022-09-06 ENCOUNTER — APPOINTMENT (OUTPATIENT)
Dept: ELECTROPHYSIOLOGY | Facility: CLINIC | Age: 57
End: 2022-09-06

## 2022-09-06 PROCEDURE — G2066: CPT

## 2022-09-06 PROCEDURE — 93298 REM INTERROG DEV EVAL SCRMS: CPT

## 2022-09-08 ENCOUNTER — APPOINTMENT (OUTPATIENT)
Dept: CARDIOLOGY | Facility: CLINIC | Age: 57
End: 2022-09-08

## 2022-09-08 PROCEDURE — 93880 EXTRACRANIAL BILAT STUDY: CPT

## 2022-09-08 PROCEDURE — 93306 TTE W/DOPPLER COMPLETE: CPT

## 2022-09-08 RX ADMIN — PERFLUTREN MG/ML: 6.52 INJECTION, SUSPENSION INTRAVENOUS at 00:00

## 2022-09-12 RX ORDER — PERFLUTREN 6.52 MG/ML
6.52 INJECTION, SUSPENSION INTRAVENOUS
Qty: 2 | Refills: 0 | Status: COMPLETED | OUTPATIENT
Start: 2022-09-08

## 2022-09-15 ENCOUNTER — NON-APPOINTMENT (OUTPATIENT)
Age: 57
End: 2022-09-15

## 2022-09-15 ENCOUNTER — APPOINTMENT (OUTPATIENT)
Dept: CARDIOLOGY | Facility: CLINIC | Age: 57
End: 2022-09-15

## 2022-09-15 VITALS
DIASTOLIC BLOOD PRESSURE: 79 MMHG | HEART RATE: 66 BPM | WEIGHT: 246 LBS | HEIGHT: 71 IN | OXYGEN SATURATION: 98 % | BODY MASS INDEX: 34.44 KG/M2 | SYSTOLIC BLOOD PRESSURE: 134 MMHG

## 2022-09-15 DIAGNOSIS — R19.5 OTHER FECAL ABNORMALITIES: ICD-10-CM

## 2022-09-15 PROCEDURE — 93000 ELECTROCARDIOGRAM COMPLETE: CPT

## 2022-09-15 PROCEDURE — 99213 OFFICE O/P EST LOW 20 MIN: CPT | Mod: 25

## 2022-09-17 PROBLEM — R19.5 OCCULT GI BLEEDING: Status: RESOLVED | Noted: 2020-01-29 | Resolved: 2022-09-17

## 2022-09-17 NOTE — PHYSICAL EXAM
[Well Developed] : well developed [Well Nourished] : well nourished [No Acute Distress] : no acute distress [Normal Conjunctiva] : normal conjunctiva [Normal Venous Pressure] : normal venous pressure [No Carotid Bruit] : no carotid bruit [Normal S1, S2] : normal S1, S2 [No Murmur] : no murmur [No Rub] : no rub [No Gallop] : no gallop [Clear Lung Fields] : clear lung fields [Good Air Entry] : good air entry [No Respiratory Distress] : no respiratory distress  [Soft] : abdomen soft [Non Tender] : non-tender [Normal Gait] : normal gait [No Edema] : no edema [No Cyanosis] : no cyanosis [No Rash] : no rash [No Skin Lesions] : no skin lesions [Moves all extremities] : moves all extremities [No Focal Deficits] : no focal deficits [Normal Speech] : normal speech [Alert and Oriented] : alert and oriented [de-identified] : RUE fistula

## 2022-09-17 NOTE — CARDIOLOGY SUMMARY
[de-identified] : \par 09/15/22 - normal sinus rhythm, first degree AV block, left axis, LAE, poor R-wave progression, nonspecific ST abnormality\par  [de-identified] : \par 01/28/21 (regadenoson MIBI) - small to moderate sized, mild to moderate intensity, fixed defect of the basal inferior, mid inferior and basal inferolateral wall consistent with soft tissue attenuation artifact, LVEF 64% \par  [de-identified] : \par 09/08/22 - mild MR, mild AV sclerosis, mild LAE, moderate concentric LVH, normal RV size and function, LVEF 65-70%\par 08/14/20 - MAC, mild MR, normal LA, mild LVH, normal RV systolic function, mild TR, LVEF 60-65%

## 2022-09-17 NOTE — DISCUSSION/SUMMARY
[Hypertension] : hypertension [Stable] : stable [FreeTextEntry1] : \par Currently stable from a cardiovascular standpoint. Normotensive. Appears relatively euvolemic. Asymptomatic. History of varying degrees of AV block. Continue current medications. ECG completed today and reviewed. Recent echo reviewed. Patient states he had stress testing with primary cardiologist (result pending). At this time, patient is considered an acceptable risk from a cardiac standpoint for renal transplant. Regular follow up with primary cardiologist is advised. [EKG obtained to assist in diagnosis and management of assessed problem(s)] : EKG obtained to assist in diagnosis and management of assessed problem(s)

## 2022-09-20 ENCOUNTER — NON-APPOINTMENT (OUTPATIENT)
Age: 57
End: 2022-09-20

## 2022-09-20 ENCOUNTER — APPOINTMENT (OUTPATIENT)
Dept: CARDIOLOGY | Facility: CLINIC | Age: 57
End: 2022-09-20

## 2022-09-20 VITALS
HEIGHT: 71 IN | RESPIRATION RATE: 16 BRPM | WEIGHT: 247 LBS | OXYGEN SATURATION: 98 % | DIASTOLIC BLOOD PRESSURE: 78 MMHG | SYSTOLIC BLOOD PRESSURE: 150 MMHG | BODY MASS INDEX: 34.58 KG/M2 | HEART RATE: 78 BPM

## 2022-09-20 PROCEDURE — 93000 ELECTROCARDIOGRAM COMPLETE: CPT

## 2022-09-20 PROCEDURE — 99214 OFFICE O/P EST MOD 30 MIN: CPT | Mod: 25

## 2022-09-20 NOTE — REASON FOR VISIT
Your results are back and they are normal.  Including your Progesterone level.  Quinn Cash MD       [FreeTextEntry1] : Followup of hypertrophic cardiomyopathy and edema

## 2022-09-20 NOTE — PHYSICAL EXAM
[General Appearance - In No Acute Distress] : no acute distress [Normal Conjunctiva] : the conjunctiva exhibited no abnormalities [Normal Oral Mucosa] : normal oral mucosa [Normal Oropharynx] : normal oropharynx [Auscultation Breath Sounds / Voice Sounds] : lungs were clear to auscultation bilaterally [Normal Rate] : normal [Rhythm Regular] : regular [Normal S1] : normal S1 [Normal S2] : normal S2 [S4] : an S4 was heard [II] : a grade 2 [Abdomen Soft] : soft [Abdomen Tenderness] : non-tender [Abnormal Walk] : normal gait [Nail Clubbing] : no clubbing of the fingernails [Cyanosis, Localized] : no localized cyanosis [Skin Color & Pigmentation] : normal skin color and pigmentation [Oriented To Time, Place, And Person] : oriented to person, place, and time [Affect] : the affect was normal [S3] : no S3 [FreeTextEntry1] : Trace bilateral LE edema

## 2022-09-20 NOTE — HISTORY OF PRESENT ILLNESS
[FreeTextEntry1] : Patient presents back to the office today feeling generally well.  He has been having a lot of issues with using his CPAP machine and has only been using it an hour or 2 at night.  He has yet to follow-up with his sleep medicine physician.  He also missed his appointment with EP because he had another important appointment with regards to his potential transplant.  He followed up with a cardiologist at his transplant center and no changes were made.  He is still waiting for kidney transplant.  He has not been monitoring his blood pressure but it is appears to be reasonably controlled with dialysis and he has been told to not take his medication before dialysis.  His edema has been well controlled.  Patient denies chest pain, shortness of breath, palpitations, orthopnea, presyncope, syncope.

## 2022-09-20 NOTE — ASSESSMENT
[FreeTextEntry1] : Echocardiogram June 1, 2020 pressures left ventricle normal size and function with ejection fraction of 60-65%. Moderate concentric LVH noted. Moderate mitral regurgitation is noted which is a bit worse than his prior echo.\par \par Nuclear stress test January 28, 2021 was a pharmacologic study which was tolerated well.  Blood pressure was elevated at baseline with a normal response to infusion.  EKG showed horizontal ST depressions in V6.  Mobitz type I heart block was noted through the exam.  Evaluation of nuclear imaging showed no evidence of ischemia or infarct with ejection fraction of 64%.  Moderate LVH was noted.  Left ventricle is moderately dilated.\par \par Echocardiogram September 8, 2022 demonstrated left nodule normal size and function with ejection fraction of 65 to 70%.  Moderate concentric LVH noted.  Mildly dilated left atrium.  Mild mitral regurgitation.\par \par Carotid Doppler September 8, 2022 showed minimal plaque at the left bulb.  No significant stenosis.  No plaque or stenosis on the right.\par \par EKG: Sinus rhythm with variable AV block with LAFB.  Poor R wave progression.  Nonspecific lateral T-wave changes.\par \par 57-year-old man with a past medical history of hypertrophic cardiomyopathy, hypertension, diabetes who presents to me for followup evaluation.  Patient appears to be stable from a cardiac standpoint.  No evidence of significant worsening heart block or CHF at this time.  EKG today again shows some variable AV block but he remains asymptomatic.  Review of his loop recorder shows occasional pauses but nothing very long and they all appear to be asymptomatic.  He needs to follow-up with EP.  There remains no indication for pacemaker placement.  Blood pressure appears to be reasonably controlled and will be followed by his nephrologist through dialysis, but he should be checking it on the off days as well.  He is continuing dialysis and remains on the transplant list.  Recent evaluation shows that he is stable for transplant when it is available.  He is having some issues with his CPAP machine and I have encouraged him to follow-up with sleep medicine to ensure that he can wear the mask adequately.  Echocardiogram is stable with a normal EF and carotid shows only minimal disease.  Review of his blood work shows good control of his lipids although his HDL remains low.

## 2022-09-20 NOTE — DISCUSSION/SUMMARY
[FreeTextEntry1] : 1. No additional cardiac testing at this time.  Consider repeat stress testing at follow-up given that he remains on the transplant list.\par 2. Continue current cardiac meds in doses as noted above for hypertrophic cardiomyopathy and hypertension and dyslipidemia. \par 3. Dialysis per nephrology.  I will also defer to nephrology with regard to his antihypertensive regimen.\par 4. Monitor BP at home, keep a log and bring to f/u.\par 5. Continue monitoring of loop recorder and looking for additional pauses. No indication for pacemaker at this time. Followup with EP.\par 6. Follow-up with pulmonary to discuss the issues he is having with his CPAP and improve compliance.\par 7. Follow up with primary doctor for treatment of diabetes.  Continue current medications.\par 8. Patient is encouraged to exercise at least 30 minutes a day everyday of the week.\par 9. Follow up here in 3 months. [EKG obtained to assist in diagnosis and management of assessed problem(s)] : EKG obtained to assist in diagnosis and management of assessed problem(s)

## 2022-10-20 ENCOUNTER — APPOINTMENT (OUTPATIENT)
Dept: NEUROLOGY | Facility: CLINIC | Age: 57
End: 2022-10-20

## 2022-10-20 VITALS
SYSTOLIC BLOOD PRESSURE: 160 MMHG | DIASTOLIC BLOOD PRESSURE: 80 MMHG | HEIGHT: 71 IN | WEIGHT: 240 LBS | BODY MASS INDEX: 33.6 KG/M2

## 2022-10-20 PROCEDURE — 99213 OFFICE O/P EST LOW 20 MIN: CPT

## 2022-10-20 RX ORDER — HYDROCORTISONE 1 %
12 CREAM (GRAM) TOPICAL
Qty: 400 | Refills: 0 | Status: ACTIVE | COMMUNITY
Start: 2022-10-17
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
## 2022-10-20 NOTE — DATA REVIEWED
[de-identified] : Brain MRI was performed on 6/18/18 at [facility].\par There were some scattered foci of nonspecific gliosis which were unchanged from a prior study in December of 2017.\par  [de-identified] : EEG was performed in the hospital on 6/18/18.\par The study demonstrated some focal slowing over the left anterior temporal region. There was also spike and wave activity in the left frontotemporal region.\par

## 2022-11-01 ENCOUNTER — APPOINTMENT (OUTPATIENT)
Dept: ELECTROPHYSIOLOGY | Facility: CLINIC | Age: 57
End: 2022-11-01

## 2022-11-01 ENCOUNTER — NON-APPOINTMENT (OUTPATIENT)
Age: 57
End: 2022-11-01

## 2022-11-01 VITALS
HEART RATE: 79 BPM | SYSTOLIC BLOOD PRESSURE: 148 MMHG | HEIGHT: 71 IN | WEIGHT: 252 LBS | TEMPERATURE: 98.4 F | OXYGEN SATURATION: 97 % | DIASTOLIC BLOOD PRESSURE: 68 MMHG | BODY MASS INDEX: 35.28 KG/M2

## 2022-11-01 DIAGNOSIS — I44.0 ATRIOVENTRICULAR BLOCK, FIRST DEGREE: ICD-10-CM

## 2022-11-01 PROCEDURE — 93000 ELECTROCARDIOGRAM COMPLETE: CPT

## 2022-11-01 PROCEDURE — 99213 OFFICE O/P EST LOW 20 MIN: CPT | Mod: 25

## 2022-11-01 RX ORDER — PREDNISONE 10 MG/1
10 TABLET ORAL
Qty: 21 | Refills: 0 | Status: DISCONTINUED | COMMUNITY
Start: 2022-05-10

## 2022-11-01 RX ORDER — ERGOCALCIFEROL 1.25 MG/1
1.25 MG CAPSULE ORAL
Refills: 0 | Status: DISCONTINUED | COMMUNITY
End: 2022-11-01

## 2022-11-01 RX ORDER — LATANOPROST 50 UG/ML
0.01 SOLUTION OPHTHALMIC
Refills: 0 | Status: DISCONTINUED | COMMUNITY
End: 2022-11-01

## 2022-11-01 RX ORDER — LANCETS
EACH MISCELLANEOUS
Qty: 100 | Refills: 0 | Status: DISCONTINUED | COMMUNITY
Start: 2022-05-23

## 2022-11-01 RX ORDER — POLYETHYLENE GLYCOL 3350 17 G/17G
17 POWDER, FOR SOLUTION ORAL
Qty: 510 | Refills: 0 | Status: DISCONTINUED | COMMUNITY
Start: 2022-06-06

## 2022-11-01 NOTE — CARDIOLOGY SUMMARY
[de-identified] : \par 11/01/22: NSR with Mobitz I block. Possible inferior infarct.\par  [de-identified] : \par 01/28/2021 Pharmacologic NST: LVEF: 64%. LV moderately dilated.\par  [de-identified] : \par 09/08/22 TTE: LVEF: 65-70%. LA mildly dilated (LAd 4.3 cm). RA normal. Mild MR. Mild AS. LVH.\par 08/14/20 TTE: Mild LVH (1.2cm IVS). LVEF: 60-65%. RV mildly dilated. Mild MR/TR. LAd: 4.2 cm. \par 06/01/20 TTE: Moderate concentric LVH (IVSd 1.4 cm). LVEF: 60-65%. LA mildly dilated (LAd: 3.9cm). RA/RV normal. Moderate MR. \par  [de-identified] : \par 01/19/2021 CT Chest: No lymphadenopathy.\par  [de-identified] : \par 06/29/2021 Nuclear Pyrophosphate Scan: Not suggestive of transthyretin cardiac amyloidosis.\par 03/23/2021 PET/CT Sarcoid: No evidence of cardiac or extracardiac sarcoidosis. No scan evidence of myocardial inflammation. Normal resting myocardial perfusion imaging. LVEF: 63%. No lymphadenopathy.\par  [de-identified] : \par NAHOMY CARDOSO (DOI: 04/20/22)\par Interrogation: Mobitz I block. No high grade AV block.\par

## 2022-11-01 NOTE — DISCUSSION/SUMMARY
[EKG obtained to assist in diagnosis and management of assessed problem(s)] : EKG obtained to assist in diagnosis and management of assessed problem(s) [FreeTextEntry1] : SARAH MORFIN is a 57 year old male with hypertension, diabetes mellitus, seizure disorder, ESRD on HD (secondary to Diabetic Nephropathy via AVF), hypertrophic cardiomyopathy with LVOT obstruction, YOSVANY (intolernat of CPAP) and ILR implant who presents for follow up. \par \par He is doing well from an arrhythmia standpoint. He continues to have Mobitz I block but he has no signs or symptoms of worsening AV conduction disease. If he does develop worsening heart block, I would recommend pursuing pacemaker implantation with leadless pacemaker given elevated risk of complications in setting of ESRD and young age. Either AVEIR dual chamber (if available) or Micra AV could be sufficient. Would avoid AVN blockers if possible.\par \par Recommendations:\par - Avoid AVN blockers\par - Continue with remote monitoring\par - Consider leadless pacemaker if patient develops high grade AV block\par \par RTC in 1 year.\par \par Manjeet Montaño MD, FACC, [facility]\par Clinical Cardiac Electrophysiology

## 2022-11-01 NOTE — HISTORY OF PRESENT ILLNESS
[FreeTextEntry1] : SARAH MORFIN is a 57 year old male with hypertension, diabetes mellitus, seizure disorder, ESRD on HD (secondary to Diabetic Nephropathy via AVF), hypertrophic cardiomyopathy with LVOT obstruction, YOSVANY (intolernat of CPAP) and ILR implant who presents for follow up. \par \par To summarize his history, he saw Dr. Johansen in 10/2018 for consideration of ILR implantation for surveillance of occult AF and NSVT as patient had a history of "seizures." He underwent ILR implantation on 12/7/2018. Since then his ILR has demonstrated increasing episodes of AV block which were first noticed in early 2020 including periods of complete heart block, though these occur predominantly at night. Review of rate histograms has also shown progressive bradycardia with Mobitz I block.\par \par He underwent additional work up with cardiac PET and nuclear pyrophosphate scans which were negative for cardiac sarcoid and amyloidosis, respectively. He is currently in the process of being evaluated for renal transplant.\par \par During previous follow up the possibility that valproic acid could be contributing to av conduction disturbances was discussed, and patient was referred back to Dr. Chawla for further consideration. Ultimately it was continued due to successful therapy. Sleep study was performed, CPAP was recommended but unfortunately he cannot tolerate it.\par \par His ILR reached RRT and so he underwent ILR explant and reimplant.\par \par Today, he reports feeling well. He denies any chest pain, palpitations, dyspnea on exertion, orthopnea, paroxysmal nocturnal dyspnea, peripheral edema, lightheadedness, dizziness, or syncope.

## 2022-11-14 ENCOUNTER — APPOINTMENT (OUTPATIENT)
Dept: CARDIOLOGY | Facility: CLINIC | Age: 57
End: 2022-11-14

## 2022-11-14 PROCEDURE — G2066: CPT

## 2022-11-14 PROCEDURE — 93298 REM INTERROG DEV EVAL SCRMS: CPT

## 2022-12-19 ENCOUNTER — APPOINTMENT (OUTPATIENT)
Dept: CARDIOLOGY | Facility: CLINIC | Age: 57
End: 2022-12-19

## 2022-12-19 PROCEDURE — 93298 REM INTERROG DEV EVAL SCRMS: CPT

## 2022-12-19 PROCEDURE — G2066: CPT

## 2022-12-22 ENCOUNTER — NON-APPOINTMENT (OUTPATIENT)
Age: 57
End: 2022-12-22

## 2022-12-22 ENCOUNTER — APPOINTMENT (OUTPATIENT)
Dept: CARDIOLOGY | Facility: CLINIC | Age: 57
End: 2022-12-22

## 2022-12-22 VITALS
RESPIRATION RATE: 16 BRPM | DIASTOLIC BLOOD PRESSURE: 74 MMHG | WEIGHT: 248 LBS | HEART RATE: 62 BPM | OXYGEN SATURATION: 97 % | SYSTOLIC BLOOD PRESSURE: 153 MMHG | HEIGHT: 71 IN | BODY MASS INDEX: 34.72 KG/M2

## 2022-12-22 PROCEDURE — 93000 ELECTROCARDIOGRAM COMPLETE: CPT

## 2022-12-22 PROCEDURE — 99215 OFFICE O/P EST HI 40 MIN: CPT | Mod: 25

## 2022-12-22 NOTE — DISCUSSION/SUMMARY
[FreeTextEntry1] : 1. No additional cardiac testing at this time.  Consider repeat stress testing at follow-up given that he remains on the transplant list.\par 2. Continue current cardiac meds in doses as noted above for hypertrophic cardiomyopathy and hypertension and dyslipidemia. \par 3. Dialysis per nephrology.  I will also defer to nephrology with regard to his antihypertensive regimen.\par 4. Monitor BP at home, keep a log and bring to f/u.\par 5. Continue monitoring of loop recorder and looking for additional pauses and higher degree heart block. No indication for pacemaker at this time.  If he has more symptoms such as dizziness not when he is first coming off dialysis he will let me know.  Followup with EP.\par 6. Follow-up with pulmonary to discuss the issues he is having with his CPAP and improve compliance.\par 7. Follow up with primary doctor for treatment of diabetes.  Continue current medications.\par 8. Patient is encouraged to exercise at least 30 minutes a day everyday of the week.\par 9. Follow up here in 3 months. [EKG obtained to assist in diagnosis and management of assessed problem(s)] : EKG obtained to assist in diagnosis and management of assessed problem(s)

## 2022-12-22 NOTE — ASSESSMENT
[FreeTextEntry1] : Echocardiogram June 1, 2020 pressures left ventricle normal size and function with ejection fraction of 60-65%. Moderate concentric LVH noted. Moderate mitral regurgitation is noted which is a bit worse than his prior echo.\par \par Nuclear stress test January 28, 2021 was a pharmacologic study which was tolerated well.  Blood pressure was elevated at baseline with a normal response to infusion.  EKG showed horizontal ST depressions in V6.  Mobitz type I heart block was noted through the exam.  Evaluation of nuclear imaging showed no evidence of ischemia or infarct with ejection fraction of 64%.  Moderate LVH was noted.  Left ventricle is moderately dilated.\par \par Echocardiogram September 8, 2022 demonstrated left nodule normal size and function with ejection fraction of 65 to 70%.  Moderate concentric LVH noted.  Mildly dilated left atrium.  Mild mitral regurgitation.\par \par Carotid Doppler September 8, 2022 showed minimal plaque at the left bulb.  No significant stenosis.  No plaque or stenosis on the right.\par \par EKG: Sinus rhythm with second-degree type I AV block with borderline LAFB.  Poor R wave progression.  Nonspecific lateral T-wave changes.\par \par 57-year-old man with a past medical history of hypertrophic cardiomyopathy, hypertension, diabetes who presents to me for followup evaluation.  Patient appears to be stable from a cardiac standpoint.  No evidence of significant worsening heart block or CHF at this time.  EKG today shows a second-degree type I AV block.  Review of his loop recorder shows occasionally possibly high degree heart blocks but overall no real change from prior.  He reports some dizziness but only when he first gets off dialysis which is likely secondary to lower blood pressure and being postdialysis.  I discussed this with EP and for now they do not recommend any pacemaker.  Ideally it would wait until at least he is able to get a kidney transplant for which she is still on the list.  EKG is otherwise unchanged.  Blood pressure little elevated today.  I once again asked to bring a list from home but we will make no changes to his medication for today.

## 2022-12-22 NOTE — HISTORY OF PRESENT ILLNESS
[FreeTextEntry1] : Patient presents back to the office today feeling fairly well.  He does report some dizziness right as he gets off dialysis but no other dizziness or lightheadedness at all.  He reports no palpitations and has had no issues with presyncope or syncope.  He has not been using his CPAP machine at all and has yet to follow-up with pulmonary but says he thinks he has an appointment.  He needs to ask his sister.  He did not bring a list of his blood pressures today.  Patient denies chest pain, shortness of breath, orthopnea.

## 2023-01-17 ENCOUNTER — RX ONLY (RX ONLY)
Age: 58
End: 2023-01-17

## 2023-01-17 ENCOUNTER — OFFICE (OUTPATIENT)
Dept: URBAN - METROPOLITAN AREA CLINIC 6 | Facility: CLINIC | Age: 58
Setting detail: OPHTHALMOLOGY
End: 2023-01-17
Payer: COMMERCIAL

## 2023-01-17 DIAGNOSIS — H00.15: ICD-10-CM

## 2023-01-17 DIAGNOSIS — H25.13: ICD-10-CM

## 2023-01-17 DIAGNOSIS — H40.1133: ICD-10-CM

## 2023-01-17 DIAGNOSIS — H01.001: ICD-10-CM

## 2023-01-17 DIAGNOSIS — H01.004: ICD-10-CM

## 2023-01-17 DIAGNOSIS — H02.834: ICD-10-CM

## 2023-01-17 DIAGNOSIS — H02.831: ICD-10-CM

## 2023-01-17 DIAGNOSIS — E11.3593: ICD-10-CM

## 2023-01-17 PROCEDURE — 92250 FUNDUS PHOTOGRAPHY W/I&R: CPT | Performed by: OPHTHALMOLOGY

## 2023-01-17 PROCEDURE — 99214 OFFICE O/P EST MOD 30 MIN: CPT | Performed by: OPHTHALMOLOGY

## 2023-01-17 ASSESSMENT — LID POSITION - DERMATOCHALASIS
OD_DERMATOCHALASIS: RUL 1+
OS_DERMATOCHALASIS: LUL 1+

## 2023-01-17 ASSESSMENT — KERATOMETRY
OD_AXISANGLE_DEGREES: 110
OS_K1POWER_DIOPTERS: 43.00
OD_K2POWER_DIOPTERS: 44.00
OS_K2POWER_DIOPTERS: 44.25
METHOD_AUTO_MANUAL: AUTO
OD_K1POWER_DIOPTERS: 43.00
OS_AXISANGLE_DEGREES: 074

## 2023-01-17 ASSESSMENT — AXIALLENGTH_DERIVED
OD_AL: 23.3987
OD_AL: 23.3987
OS_AL: 23.4977
OS_AL: 23.4977

## 2023-01-17 ASSESSMENT — PACHYMETRY
OS_CT_UM: 537
OS_CT_CORRECTION: 1
OD_CT_CORRECTION: 1
OD_CT_UM: 539

## 2023-01-17 ASSESSMENT — VISUAL ACUITY
OS_BCVA: 20/40-2
OD_BCVA: 20/40+2

## 2023-01-17 ASSESSMENT — REFRACTION_AUTOREFRACTION
OS_AXIS: 173
OS_SPHERE: +0.75
OD_AXIS: 014
OS_CYLINDER: -1.25
OD_CYLINDER: -1.00
OD_SPHERE: +1.00

## 2023-01-17 ASSESSMENT — REFRACTION_MANIFEST
OD_SPHERE: +1.00
OS_VA1: 20/30
OS_AXIS: 175
OS_VA2: 20/20
OS_SPHERE: +0.75
OD_VA1: 20/30
OU_VA: 20/30-1
OS_CYLINDER: -1.25
OS_ADD: +2.25
OD_CYLINDER: -1.00
OD_VA2: 20/20
OD_AXIS: 015
OD_ADD: +2.25

## 2023-01-17 ASSESSMENT — SPHEQUIV_DERIVED
OS_SPHEQUIV: 0.125
OD_SPHEQUIV: 0.5
OS_SPHEQUIV: 0.125
OD_SPHEQUIV: 0.5

## 2023-01-17 ASSESSMENT — LID EXAM ASSESSMENTS
OD_BLEPHARITIS: RUL 1+ 2+
OS_BLEPHARITIS: LUL 1+ 2+

## 2023-01-17 ASSESSMENT — TONOMETRY
OS_IOP_MMHG: 18
OD_IOP_MMHG: 18

## 2023-01-23 ENCOUNTER — APPOINTMENT (OUTPATIENT)
Dept: CARDIOLOGY | Facility: CLINIC | Age: 58
End: 2023-01-23
Payer: MEDICAID

## 2023-01-23 PROCEDURE — G2066: CPT

## 2023-01-23 PROCEDURE — 93298 REM INTERROG DEV EVAL SCRMS: CPT

## 2023-02-07 ENCOUNTER — OFFICE (OUTPATIENT)
Dept: URBAN - METROPOLITAN AREA CLINIC 6 | Facility: CLINIC | Age: 58
Setting detail: OPHTHALMOLOGY
End: 2023-02-07
Payer: COMMERCIAL

## 2023-02-07 DIAGNOSIS — H02.834: ICD-10-CM

## 2023-02-07 DIAGNOSIS — H01.004: ICD-10-CM

## 2023-02-07 DIAGNOSIS — H40.1133: ICD-10-CM

## 2023-02-07 DIAGNOSIS — H02.831: ICD-10-CM

## 2023-02-07 DIAGNOSIS — H00.15: ICD-10-CM

## 2023-02-07 DIAGNOSIS — H25.13: ICD-10-CM

## 2023-02-07 DIAGNOSIS — H01.001: ICD-10-CM

## 2023-02-07 PROCEDURE — 99213 OFFICE O/P EST LOW 20 MIN: CPT | Performed by: OPHTHALMOLOGY

## 2023-02-07 ASSESSMENT — SPHEQUIV_DERIVED
OD_SPHEQUIV: 0.5
OD_SPHEQUIV: 0.375
OS_SPHEQUIV: 0.125
OS_SPHEQUIV: 0.25

## 2023-02-07 ASSESSMENT — KERATOMETRY
OD_AXISANGLE_DEGREES: 110
OS_K2POWER_DIOPTERS: 44.00
OD_K1POWER_DIOPTERS: 43.00
OS_AXISANGLE_DEGREES: 080
METHOD_AUTO_MANUAL: AUTO
OS_K1POWER_DIOPTERS: 43.00
OD_K2POWER_DIOPTERS: 43.75

## 2023-02-07 ASSESSMENT — REFRACTION_MANIFEST
OD_SPHERE: +1.00
OD_VA1: 20/30
OS_AXIS: 175
OD_CYLINDER: -1.00
OD_VA2: 20/20
OU_VA: 20/30-1
OS_ADD: +2.25
OS_CYLINDER: -1.25
OS_VA2: 20/20
OS_VA1: 20/30
OS_SPHERE: +0.75
OD_ADD: +2.25
OD_AXIS: 015

## 2023-02-07 ASSESSMENT — REFRACTION_AUTOREFRACTION
OD_AXIS: 010
OS_SPHERE: +0.75
OD_CYLINDER: -0.75
OS_CYLINDER: -1.00
OS_AXIS: 175
OD_SPHERE: +0.75

## 2023-02-07 ASSESSMENT — CONFRONTATIONAL VISUAL FIELD TEST (CVF)
OS_FINDINGS: FULL
OD_FINDINGS: FULL

## 2023-02-07 ASSESSMENT — VISUAL ACUITY
OD_BCVA: 20/30-1
OS_BCVA: 20/30-2

## 2023-02-07 ASSESSMENT — LID POSITION - DERMATOCHALASIS
OS_DERMATOCHALASIS: LUL 1+
OD_DERMATOCHALASIS: RUL 1+

## 2023-02-07 ASSESSMENT — PACHYMETRY
OD_CT_UM: 539
OS_CT_UM: 537
OS_CT_CORRECTION: 1
OD_CT_CORRECTION: 1

## 2023-02-07 ASSESSMENT — TONOMETRY
OS_IOP_MMHG: 16
OD_IOP_MMHG: 14

## 2023-02-07 ASSESSMENT — LID EXAM ASSESSMENTS
OD_BLEPHARITIS: RUL 1+ 2+
OS_BLEPHARITIS: LUL 1+ 2+

## 2023-02-07 ASSESSMENT — AXIALLENGTH_DERIVED
OS_AL: 23.4949
OD_AL: 23.4439
OD_AL: 23.4921
OS_AL: 23.5433

## 2023-02-27 ENCOUNTER — APPOINTMENT (OUTPATIENT)
Dept: CARDIOLOGY | Facility: CLINIC | Age: 58
End: 2023-02-27
Payer: MEDICAID

## 2023-02-27 PROCEDURE — 93298 REM INTERROG DEV EVAL SCRMS: CPT

## 2023-02-27 PROCEDURE — G2066: CPT

## 2023-03-28 ENCOUNTER — APPOINTMENT (OUTPATIENT)
Dept: CARDIOLOGY | Facility: CLINIC | Age: 58
End: 2023-03-28
Payer: MEDICAID

## 2023-03-28 ENCOUNTER — NON-APPOINTMENT (OUTPATIENT)
Age: 58
End: 2023-03-28

## 2023-03-28 VITALS
HEART RATE: 58 BPM | SYSTOLIC BLOOD PRESSURE: 176 MMHG | BODY MASS INDEX: 34.72 KG/M2 | RESPIRATION RATE: 16 BRPM | OXYGEN SATURATION: 97 % | HEIGHT: 71 IN | DIASTOLIC BLOOD PRESSURE: 76 MMHG | WEIGHT: 248 LBS

## 2023-03-28 PROCEDURE — 99214 OFFICE O/P EST MOD 30 MIN: CPT | Mod: 25

## 2023-03-28 PROCEDURE — 93000 ELECTROCARDIOGRAM COMPLETE: CPT

## 2023-03-28 RX ORDER — AMLODIPINE BESYLATE 10 MG/1
10 TABLET ORAL
Qty: 90 | Refills: 1 | Status: ACTIVE | COMMUNITY
Start: 2021-11-08 | End: 1900-01-01

## 2023-03-28 RX ORDER — ATORVASTATIN CALCIUM 40 MG/1
40 TABLET, FILM COATED ORAL
Qty: 90 | Refills: 1 | Status: ACTIVE | COMMUNITY
Start: 2020-12-10 | End: 1900-01-01

## 2023-03-28 NOTE — DISCUSSION/SUMMARY
[FreeTextEntry1] : 1. Plan nuclear stress test to evaluate prior to possible transplant surgery.\par 2. Continue current cardiac meds in doses as noted above for hypertrophic cardiomyopathy and hypertension and dyslipidemia. \par 3. Dialysis per nephrology.  I will also defer to nephrology with regard to his antihypertensive regimen.\par 4. Monitor BP at home, keep a log and bring to f/u.\par 5. Continue monitoring of loop recorder and looking for additional pauses and higher degree heart block. No indication for pacemaker at this time.  Followup with EP.\par 6. Follow-up with pulmonary to discuss the issues he is having with his CPAP and improve compliance.\par 7. Follow up with primary doctor for treatment of diabetes.  Continue current medications.\par 8. Patient is encouraged to exercise at least 30 minutes a day everyday of the week.\par 9. Follow up here in 3-4 months. [EKG obtained to assist in diagnosis and management of assessed problem(s)] : EKG obtained to assist in diagnosis and management of assessed problem(s)

## 2023-03-28 NOTE — ASSESSMENT
[FreeTextEntry1] : Echocardiogram June 1, 2020 pressures left ventricle normal size and function with ejection fraction of 60-65%. Moderate concentric LVH noted. Moderate mitral regurgitation is noted which is a bit worse than his prior echo.\par \par Nuclear stress test January 28, 2021 was a pharmacologic study which was tolerated well.  Blood pressure was elevated at baseline with a normal response to infusion.  EKG showed horizontal ST depressions in V6.  Mobitz type I heart block was noted through the exam.  Evaluation of nuclear imaging showed no evidence of ischemia or infarct with ejection fraction of 64%.  Moderate LVH was noted.  Left ventricle is moderately dilated.\par \par Echocardiogram September 8, 2022 demonstrated left nodule normal size and function with ejection fraction of 65 to 70%.  Moderate concentric LVH noted.  Mildly dilated left atrium.  Mild mitral regurgitation.\par \par Carotid Doppler September 8, 2022 showed minimal plaque at the left bulb.  No significant stenosis.  No plaque or stenosis on the right.\par \par EKG: Sinus rhythm with second-degree type I AV block with LAFB.  Poor R wave progression.\par \par 58-year-old man with a past medical history of hypertrophic cardiomyopathy, hypertension, diabetes who presents to me for followup evaluation.  Patient appears to be stable from a cardiac standpoint.  No evidence of significant worsening heart block or CHF at this time.  EKG today shows a second-degree type I AV block.  Review of his loop recorder shows occasionally possibly high degree heart blocks but overall no real change from prior.  He still mostly has higher heart block when he sleeps and is currently not using his CPAP at all.  He needs to follow-up with pulmonary to consider other treatments for his sleep apnea.  Blood pressure is elevated today but he did not take his medication which she will take when he gets home.  He says it has been controlled at dialysis and he will bring me a list when he comes to follow-up.  No changes to medication for today.  I would like him to have stress testing done in anticipation of transplant in the hopefully near future.

## 2023-03-28 NOTE — PHYSICAL EXAM
[General Appearance - In No Acute Distress] : no acute distress [Normal Conjunctiva] : the conjunctiva exhibited no abnormalities [Normal Oral Mucosa] : normal oral mucosa [Normal Oropharynx] : normal oropharynx [Auscultation Breath Sounds / Voice Sounds] : lungs were clear to auscultation bilaterally [Normal Rate] : normal [Rhythm Regular] : regular [Normal S1] : normal S1 [Normal S2] : normal S2 [S4] : an S4 was heard [II] : a grade 2 [Abdomen Soft] : soft [Abdomen Tenderness] : non-tender [Abnormal Walk] : normal gait [Nail Clubbing] : no clubbing of the fingernails [Cyanosis, Localized] : no localized cyanosis [Skin Color & Pigmentation] : normal skin color and pigmentation [Oriented To Time, Place, And Person] : oriented to person, place, and time [Affect] : the affect was normal [FreeTextEntry1] : No JVD, no carotid bruits. [S3] : no S3

## 2023-03-28 NOTE — HISTORY OF PRESENT ILLNESS
[FreeTextEntry1] : Patient presents back to the office today feeling well.  He reports no real symptoms at all.  Edema continues to be very well controlled.  No dizziness or lightheadedness.  He continues to tolerate dialysis without a problem and says that his blood pressures have been controlled when he goes to dialysis.  He did not take his medications this morning however.  Patient denies chest pain, shortness of breath, palpitations, orthopnea, presyncope, syncope.

## 2023-04-03 ENCOUNTER — APPOINTMENT (OUTPATIENT)
Dept: CARDIOLOGY | Facility: CLINIC | Age: 58
End: 2023-04-03
Payer: MEDICAID

## 2023-04-03 PROCEDURE — G2066: CPT

## 2023-04-03 PROCEDURE — 93298 REM INTERROG DEV EVAL SCRMS: CPT

## 2023-04-27 ENCOUNTER — APPOINTMENT (OUTPATIENT)
Dept: NEUROLOGY | Facility: CLINIC | Age: 58
End: 2023-04-27
Payer: MEDICAID

## 2023-04-27 VITALS
BODY MASS INDEX: 35 KG/M2 | HEIGHT: 71 IN | WEIGHT: 250 LBS | SYSTOLIC BLOOD PRESSURE: 160 MMHG | DIASTOLIC BLOOD PRESSURE: 78 MMHG

## 2023-04-27 PROCEDURE — 99213 OFFICE O/P EST LOW 20 MIN: CPT

## 2023-04-27 NOTE — DATA REVIEWED
[de-identified] : Brain MRI was performed on 6/18/18 at [facility].\par There were some scattered foci of nonspecific gliosis which were unchanged from a prior study in December of 2017.\par  [de-identified] : EEG was performed in the hospital on 6/18/18.\par The study demonstrated some focal slowing over the left anterior temporal region. There was also spike and wave activity in the left frontotemporal region.\par
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
## 2023-04-27 NOTE — ASSESSMENT
[FreeTextEntry1] : This is a 58 year-old man who had an episode suggestive of seizure in June of 2018. \par EEG demonstrated a focal spike formation. This was left anterior temporal.\par \par He is currently on Depakote 500 mg twice per day.\par He has been seizure-free on this dosage for more than one year.\par \par I will see him back in 6 months.

## 2023-05-08 ENCOUNTER — APPOINTMENT (OUTPATIENT)
Dept: CARDIOLOGY | Facility: CLINIC | Age: 58
End: 2023-05-08
Payer: MEDICAID

## 2023-05-08 PROCEDURE — G2066: CPT

## 2023-05-08 PROCEDURE — 93298 REM INTERROG DEV EVAL SCRMS: CPT

## 2023-05-16 ENCOUNTER — OFFICE (OUTPATIENT)
Dept: URBAN - METROPOLITAN AREA CLINIC 6 | Facility: CLINIC | Age: 58
Setting detail: OPHTHALMOLOGY
End: 2023-05-16
Payer: COMMERCIAL

## 2023-05-16 DIAGNOSIS — H02.831: ICD-10-CM

## 2023-05-16 DIAGNOSIS — H40.1133: ICD-10-CM

## 2023-05-16 DIAGNOSIS — H02.834: ICD-10-CM

## 2023-05-16 DIAGNOSIS — H01.004: ICD-10-CM

## 2023-05-16 DIAGNOSIS — H25.13: ICD-10-CM

## 2023-05-16 DIAGNOSIS — H00.15: ICD-10-CM

## 2023-05-16 DIAGNOSIS — H01.001: ICD-10-CM

## 2023-05-16 DIAGNOSIS — E11.3593: ICD-10-CM

## 2023-05-16 PROCEDURE — 92083 EXTENDED VISUAL FIELD XM: CPT | Performed by: OPHTHALMOLOGY

## 2023-05-16 PROCEDURE — 92133 CPTRZD OPH DX IMG PST SGM ON: CPT | Performed by: OPHTHALMOLOGY

## 2023-05-16 PROCEDURE — 99214 OFFICE O/P EST MOD 30 MIN: CPT | Performed by: OPHTHALMOLOGY

## 2023-05-16 ASSESSMENT — AXIALLENGTH_DERIVED
OS_AL: 23.3987
OD_AL: 23.4014
OS_AL: 23.5433
OD_AL: 23.3536

## 2023-05-16 ASSESSMENT — KERATOMETRY
OS_AXISANGLE_DEGREES: 076
METHOD_AUTO_MANUAL: AUTO
OS_K1POWER_DIOPTERS: 43.00
OS_K2POWER_DIOPTERS: 44.00
OD_AXISANGLE_DEGREES: 114
OD_K2POWER_DIOPTERS: 44.25
OD_K1POWER_DIOPTERS: 43.00

## 2023-05-16 ASSESSMENT — REFRACTION_MANIFEST
OD_CYLINDER: -1.00
OS_VA1: 20/30
OS_CYLINDER: -1.25
OS_VA2: 20/20
OD_SPHERE: +1.00
OU_VA: 20/30-1
OS_ADD: +2.25
OS_AXIS: 175
OD_ADD: +2.25
OD_AXIS: 015
OD_VA2: 20/20
OD_VA1: 20/30
OS_SPHERE: +0.75

## 2023-05-16 ASSESSMENT — SPHEQUIV_DERIVED
OS_SPHEQUIV: 0.5
OD_SPHEQUIV: 0.5
OS_SPHEQUIV: 0.125
OD_SPHEQUIV: 0.375

## 2023-05-16 ASSESSMENT — LID EXAM ASSESSMENTS
OS_BLEPHARITIS: LUL 1+ 2+
OD_BLEPHARITIS: RUL 1+ 2+

## 2023-05-16 ASSESSMENT — REFRACTION_AUTOREFRACTION
OD_AXIS: 022
OD_SPHERE: +0.75
OD_CYLINDER: -0.75
OS_AXIS: 173
OS_CYLINDER: -1.00
OS_SPHERE: +1.00

## 2023-05-16 ASSESSMENT — CONFRONTATIONAL VISUAL FIELD TEST (CVF)
OD_FINDINGS: FULL
OS_FINDINGS: FULL

## 2023-05-16 ASSESSMENT — LID POSITION - DERMATOCHALASIS
OS_DERMATOCHALASIS: LUL 1+
OD_DERMATOCHALASIS: RUL 1+

## 2023-05-16 ASSESSMENT — VISUAL ACUITY
OS_BCVA: 20/30-1
OD_BCVA: 20/40-1

## 2023-05-16 ASSESSMENT — TONOMETRY
OS_IOP_MMHG: 14
OD_IOP_MMHG: 16

## 2023-05-16 ASSESSMENT — PACHYMETRY
OD_CT_UM: 539
OD_CT_CORRECTION: 1
OS_CT_CORRECTION: 1
OS_CT_UM: 537

## 2023-06-12 ENCOUNTER — APPOINTMENT (OUTPATIENT)
Dept: CARDIOLOGY | Facility: CLINIC | Age: 58
End: 2023-06-12
Payer: MEDICAID

## 2023-06-12 PROCEDURE — 93298 REM INTERROG DEV EVAL SCRMS: CPT

## 2023-06-12 PROCEDURE — G2066: CPT

## 2023-06-20 ENCOUNTER — APPOINTMENT (OUTPATIENT)
Dept: CARDIOLOGY | Facility: CLINIC | Age: 58
End: 2023-06-20
Payer: MEDICAID

## 2023-06-20 ENCOUNTER — MED ADMIN CHARGE (OUTPATIENT)
Age: 58
End: 2023-06-20

## 2023-06-20 PROCEDURE — 93015 CV STRESS TEST SUPVJ I&R: CPT

## 2023-06-20 PROCEDURE — A9500: CPT

## 2023-06-20 PROCEDURE — 78452 HT MUSCLE IMAGE SPECT MULT: CPT

## 2023-06-20 RX ADMIN — REGADENOSON 0 MG/5ML: 0.08 INJECTION, SOLUTION INTRAVENOUS at 00:00

## 2023-06-21 RX ORDER — REGADENOSON 0.08 MG/ML
0.4 INJECTION, SOLUTION INTRAVENOUS
Qty: 2 | Refills: 0 | Status: COMPLETED | OUTPATIENT
Start: 2023-06-20

## 2023-07-06 ENCOUNTER — APPOINTMENT (OUTPATIENT)
Dept: CARDIOLOGY | Facility: CLINIC | Age: 58
End: 2023-07-06
Payer: MEDICAID

## 2023-07-06 VITALS
DIASTOLIC BLOOD PRESSURE: 70 MMHG | SYSTOLIC BLOOD PRESSURE: 136 MMHG | HEIGHT: 71 IN | WEIGHT: 242 LBS | RESPIRATION RATE: 16 BRPM | HEART RATE: 84 BPM | BODY MASS INDEX: 33.88 KG/M2

## 2023-07-06 DIAGNOSIS — I44.2 ATRIOVENTRICULAR BLOCK, COMPLETE: ICD-10-CM

## 2023-07-06 PROCEDURE — 99214 OFFICE O/P EST MOD 30 MIN: CPT | Mod: 25

## 2023-07-06 PROCEDURE — 93000 ELECTROCARDIOGRAM COMPLETE: CPT

## 2023-07-06 NOTE — HISTORY OF PRESENT ILLNESS
[FreeTextEntry1] : Patient presents back to the office today feeling very well and offering no complaints.  He continues to do dialysis without a problem.  He has not had any issues with edema.  He is still waiting on the transplant list for a kidney transplant.  He is tolerating all his medication without a problem.  No dizziness or lightheadedness.  Patient denies chest pain, shortness of breath, palpitations, orthopnea, presyncope, syncope.

## 2023-07-06 NOTE — DISCUSSION/SUMMARY
[FreeTextEntry1] : 1. No additional cardiac testing at this time.\par 2. Continue current cardiac meds in doses as noted above for hypertrophic cardiomyopathy and hypertension and dyslipidemia. \par 3. Dialysis per nephrology.  I will also defer to nephrology with regard to his antihypertensive regimen.\par 4. Monitor BP at home, keep a log and bring to f/u.\par 5. Continue monitoring of loop recorder and looking for additional pauses and higher degree heart block. No indication for pacemaker at this time.  Followup with EP.\par 6. Follow-up with pulmonary to discuss the issues he is having with his CPAP and improve compliance.\par 7. Follow up with primary doctor for treatment of diabetes.  Continue current medications.\par 8. Patient is encouraged to exercise at least 30 minutes a day everyday of the week.\par 9. Follow up here in 4 months. [EKG obtained to assist in diagnosis and management of assessed problem(s)] : EKG obtained to assist in diagnosis and management of assessed problem(s)

## 2023-07-06 NOTE — PHYSICAL EXAM
[General Appearance - In No Acute Distress] : no acute distress [Normal Conjunctiva] : the conjunctiva exhibited no abnormalities [Normal Oral Mucosa] : normal oral mucosa [Normal Oropharynx] : normal oropharynx [Auscultation Breath Sounds / Voice Sounds] : lungs were clear to auscultation bilaterally [Normal Rate] : normal [Rhythm Regular] : regular [Normal S1] : normal S1 [Normal S2] : normal S2 [S4] : an S4 was heard [II] : a grade 2 [Abdomen Soft] : soft [Abdomen Tenderness] : non-tender [Abnormal Walk] : normal gait [Skin Color & Pigmentation] : normal skin color and pigmentation [Oriented To Time, Place, And Person] : oriented to person, place, and time [Affect] : the affect was normal [FreeTextEntry1] : No JVD, no carotid bruits. [S3] : no S3

## 2023-07-06 NOTE — ASSESSMENT
[FreeTextEntry1] : Echocardiogram June 1, 2020 pressures left ventricle normal size and function with ejection fraction of 60-65%. Moderate concentric LVH noted. Moderate mitral regurgitation is noted which is a bit worse than his prior echo.\par \par Nuclear stress test January 28, 2021 was a pharmacologic study which was tolerated well.  Blood pressure was elevated at baseline with a normal response to infusion.  EKG showed horizontal ST depressions in V6.  Mobitz type I heart block was noted through the exam.  Evaluation of nuclear imaging showed no evidence of ischemia or infarct with ejection fraction of 64%.  Moderate LVH was noted.  Left ventricle is moderately dilated.\par \par Echocardiogram September 8, 2022 demonstrated left nodule normal size and function with ejection fraction of 65 to 70%.  Moderate concentric LVH noted.  Mildly dilated left atrium.  Mild mitral regurgitation.\par \par Carotid Doppler September 8, 2022 showed minimal plaque at the left bulb.  No significant stenosis.  No plaque or stenosis on the right.\par \par Nuclear stress test June 20, 2023 was a pharmacologic study which was tolerated well.  Blood pressure was elevated at baseline at 156/64 with a normal response to infusion.  EKG showed no evidence of ischemia with infusion.  Evaluation of nuclear ridging showed no evidence of ischemia or infarct and ejection fraction of 68%.\par \par 58-year-old man with a past medical history of hypertrophic cardiomyopathy, hypertension, diabetes who presents to me for followup evaluation.  Patient appears to be stable from a cardiac standpoint.  No evidence of significant worsening heart block or CHF at this time.  Stress testing shows no evidence of ischemia and a normal EF.  Review of his loop recorder shows no more significant higher degree heart block.  Blood pressure is a little elevated here today but his blood pressures are being managed by his nephrologist.  No evidence of acute heart failure at this time.  He returned his CPAP machine and has given up on it but I have strongly encouraged him to go back to pulmonary to discuss other options and he says he will do so.

## 2023-07-17 ENCOUNTER — APPOINTMENT (OUTPATIENT)
Dept: CARDIOLOGY | Facility: CLINIC | Age: 58
End: 2023-07-17
Payer: MEDICAID

## 2023-07-17 PROCEDURE — 93298 REM INTERROG DEV EVAL SCRMS: CPT

## 2023-07-17 PROCEDURE — G2066: CPT

## 2023-07-19 RX ORDER — DOXAZOSIN 4 MG/1
4 TABLET ORAL
Qty: 90 | Refills: 1 | Status: ACTIVE | COMMUNITY
Start: 1900-01-01 | End: 1900-01-01

## 2023-08-08 ENCOUNTER — APPOINTMENT (OUTPATIENT)
Dept: NEPHROLOGY | Facility: CLINIC | Age: 58
End: 2023-08-08
Payer: COMMERCIAL

## 2023-08-08 VITALS
HEART RATE: 51 BPM | SYSTOLIC BLOOD PRESSURE: 149 MMHG | BODY MASS INDEX: 33.88 KG/M2 | WEIGHT: 242 LBS | OXYGEN SATURATION: 96 % | HEIGHT: 71 IN | RESPIRATION RATE: 16 BRPM | DIASTOLIC BLOOD PRESSURE: 73 MMHG

## 2023-08-08 PROCEDURE — 99214 OFFICE O/P EST MOD 30 MIN: CPT

## 2023-08-08 RX ORDER — INSULIN GLARGINE 100 [IU]/ML
100 INJECTION, SOLUTION SUBCUTANEOUS
Refills: 0 | Status: ACTIVE | COMMUNITY

## 2023-08-08 RX ORDER — POLYMYXIN B SULFATE AND TRIMETHOPRIM 10000; 1 [USP'U]/ML; MG/ML
10000-0.1 SOLUTION OPHTHALMIC
Qty: 10 | Refills: 0 | Status: DISCONTINUED | COMMUNITY
Start: 2022-09-22 | End: 2023-08-08

## 2023-08-08 NOTE — PLAN
[FreeTextEntry1] : 1.  ESRD - Mr Travis is a reasonable candidate for kidney transplantation.  His sister would like to donate a kidney to him but he is reluctant to accept.   2.  CV - Stress test wnl and up to date.  Last echo in 2022 - will update.  No cardiac history. 3.  Cancer screening - colonoscopy up to date.  4.  DM2 - check A1c. 5.  HTN -  BP stable.   I have personally discussed the risks and benefits of transplantation and patient attended transplant education class where the following was disclosed:   Reviewed factors affecting survival and morbidity while on dialysis, the transplant wait list and reviewed shaheed-operative and long-term risk factors affecting outcome in kidney transplantation.     One year SRTR outcomes for national and [facility] were discussed in regards to patient survival and graft survival after transplantation.     Details of transplant surgery, including complications were discussed. Immunosuppression and complications including infection including life threatening sepsis and opportunistic infections, malignancy and new onset diabetes were discussed.     Benefits of live donor transplantation as well as variability in wait times across regions and multiple listing were discussed.  KDPI >85% and PHS high risk criteria donors were discussed.  HCV kidney transplantation was discussed.
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
## 2023-08-08 NOTE — REASON FOR VISIT
[Follow-Up] : a follow-up visit for [Kidney Transplant Evaluation] : kidney transplant evaluation [FreeTextEntry1] : Pre kidney transplant evaluation

## 2023-08-08 NOTE — PHYSICAL EXAM
[General Appearance - Alert] : alert [General Appearance - In No Acute Distress] : in no acute distress [Sclera] : the sclera and conjunctiva were normal [Neck Cervical Mass (___cm)] : no neck mass was observed [Jugular Venous Distention Increased] : there was no jugular-venous distention [Respiration, Rhythm And Depth] : normal respiratory rhythm and effort [Exaggerated Use Of Accessory Muscles For Inspiration] : no accessory muscle use [Auscultation Breath Sounds / Voice Sounds] : lungs were clear to auscultation bilaterally [Apical Impulse] : the apical impulse was normal [Heart Rate And Rhythm] : heart rate was normal and rhythm regular [Heart Sounds] : normal S1 and S2 [Heart Sounds Gallop] : no gallops [Heart Sounds Pericardial Friction Rub] : no pericardial rub [Arterial Pulses Carotid] : carotid pulses were normal with no bruits [Edema] : there was no peripheral edema [Bowel Sounds] : normal bowel sounds [Abdomen Soft] : soft [Abdomen Tenderness] : non-tender [Abdomen Mass (___ Cm)] : no abdominal mass palpated [Abdomen Hernia] : no hernia was discovered [No CVA Tenderness] : no ~M costovertebral angle tenderness [No Spinal Tenderness] : no spinal tenderness [Nail Clubbing] : no clubbing  or cyanosis of the fingernails [Involuntary Movements] : no involuntary movements were seen [Musculoskeletal - Swelling] : no joint swelling seen [___ (cm) Fistula] : [unfilled] (cm) fistula [Bruit] : a bruit was present [Thrill] : a thrill was present [Skin Lesions] : no skin lesions [] : right dorsalis pedis palpable [No Focal Deficits] : no focal deficits [Oriented To Time, Place, And Person] : oriented to person, place, and time [Affect] : the affect was normal [Impaired Insight] : insight and judgment were intact [Mood] : the mood was normal [Full Pulse] : the pedal pulses are present [FreeTextEntry1] : No ulcers

## 2023-08-11 ENCOUNTER — NON-APPOINTMENT (OUTPATIENT)
Age: 58
End: 2023-08-11

## 2023-08-11 LAB
ABO + RH PNL BLD: NORMAL
ALBUMIN SERPL ELPH-MCNC: 3.9 G/DL
ALP BLD-CCNC: 84 U/L
ALT SERPL-CCNC: 7 U/L
ANION GAP SERPL CALC-SCNC: 13 MMOL/L
AST SERPL-CCNC: 7 U/L
BILIRUB SERPL-MCNC: 0.7 MG/DL
BUN SERPL-MCNC: 23 MG/DL
C PEPTIDE SERPL-MCNC: 7.6 NG/ML
CALCIUM SERPL-MCNC: 8.2 MG/DL
CHLORIDE SERPL-SCNC: 98 MMOL/L
CHOLEST SERPL-MCNC: 152 MG/DL
CK SERPL-CCNC: 172 U/L
CMV IGG SERPL QL: 8.3 U/ML
CMV IGG SERPL-IMP: POSITIVE
CO2 SERPL-SCNC: 30 MMOL/L
COVID-19 SPIKE DOMAIN ANTIBODY INTERPRETATION: POSITIVE
CREAT SERPL-MCNC: 6.02 MG/DL
CRP SERPL-MCNC: 34 MG/L
EBV EA AB SER IA-ACNC: <5 U/ML
EBV EA AB TITR SER IF: POSITIVE
EBV EA IGG SER QL IA: 517 U/ML
EBV EA IGG SER-ACNC: NEGATIVE
EBV EA IGM SER IA-ACNC: NEGATIVE
EBV PATRN SPEC IB-IMP: NORMAL
EBV VCA IGG SER IA-ACNC: >750 U/ML
EBV VCA IGM SER QL IA: <10 U/ML
EGFR: 10 ML/MIN/1.73M2
EPSTEIN-BARR VIRUS CAPSID ANTIGEN IGG: POSITIVE
ERYTHROCYTE [SEDIMENTATION RATE] IN BLOOD BY WESTERGREN METHOD: 56 MM/HR
ESTIMATED AVERAGE GLUCOSE: 143 MG/DL
GLUCOSE SERPL-MCNC: 96 MG/DL
HAV IGM SER QL: NONREACTIVE
HBA1C MFR BLD HPLC: 6.6 %
HBV CORE IGG+IGM SER QL: NONREACTIVE
HBV SURFACE AB SER QL: REACTIVE
HBV SURFACE AB SERPL IA-ACNC: 37.7 MIU/ML
HBV SURFACE AG SER QL: NONREACTIVE
HCV AB SER QL: NONREACTIVE
HCV S/CO RATIO: 0.13 S/CO
HDLC SERPL-MCNC: 30 MG/DL
HEPATITIS A IGG ANTIBODY: REACTIVE
HIV1+2 AB SPEC QL IA.RAPID: NONREACTIVE
HSV 1+2 IGG SER IA-IMP: NEGATIVE
HSV 1+2 IGG SER IA-IMP: POSITIVE
HSV 1+2 IGG SER IA-IMP: POSITIVE
HSV1 IGG SER QL: >62.2 INDEX
HSV1 IGG SER QL: >62.2 INDEX
HSV2 IGG SER QL: 0.24 INDEX
LDLC SERPL CALC-MCNC: 97 MG/DL
M TB IFN-G BLD-IMP: POSITIVE
MAGNESIUM SERPL-MCNC: 2.1 MG/DL
NONHDLC SERPL-MCNC: 122 MG/DL
PHOSPHATE SERPL-MCNC: 4.3 MG/DL
POTASSIUM SERPL-SCNC: 4.7 MMOL/L
PROT SERPL-MCNC: 6.1 G/DL
PSA SERPL-MCNC: 0.48 NG/ML
QUANTIFERON TB PLUS MITOGEN MINUS NIL: 9.05 IU/ML
QUANTIFERON TB PLUS NIL: 0.06 IU/ML
QUANTIFERON TB PLUS TB1 MINUS NIL: 0.73 IU/ML
QUANTIFERON TB PLUS TB2 MINUS NIL: 0.64 IU/ML
RUBV IGG FLD-ACNC: 3.8 INDEX
RUBV IGG SER-IMP: POSITIVE
SARS-COV-2 AB SERPL IA-ACNC: >250 U/ML
SODIUM SERPL-SCNC: 141 MMOL/L
T GONDII AB SER-IMP: POSITIVE
T GONDII IGG SER QL: 226 IU/ML
T PALLIDUM AB SER QL IA: NEGATIVE
T3 SERPL-MCNC: 140 NG/DL
T4 FREE SERPL-MCNC: 1.3 NG/DL
TRIGL SERPL-MCNC: 135 MG/DL
TSH SERPL-ACNC: 1.71 UIU/ML
URATE SERPL-MCNC: 4.9 MG/DL
VZV AB TITR SER: POSITIVE
VZV IGG SER IF-ACNC: 1642 INDEX

## 2023-08-21 ENCOUNTER — APPOINTMENT (OUTPATIENT)
Dept: CARDIOLOGY | Facility: CLINIC | Age: 58
End: 2023-08-21
Payer: MEDICAID

## 2023-08-21 PROCEDURE — 93298 REM INTERROG DEV EVAL SCRMS: CPT

## 2023-08-21 PROCEDURE — G2066: CPT

## 2023-09-05 ENCOUNTER — APPOINTMENT (OUTPATIENT)
Dept: CT IMAGING | Facility: CLINIC | Age: 58
End: 2023-09-05
Payer: COMMERCIAL

## 2023-09-05 PROCEDURE — 74177 CT ABD & PELVIS W/CONTRAST: CPT

## 2023-09-12 ENCOUNTER — OFFICE (OUTPATIENT)
Dept: URBAN - METROPOLITAN AREA CLINIC 6 | Facility: CLINIC | Age: 58
Setting detail: OPHTHALMOLOGY
End: 2023-09-12
Payer: COMMERCIAL

## 2023-09-12 DIAGNOSIS — H01.001: ICD-10-CM

## 2023-09-12 DIAGNOSIS — H25.13: ICD-10-CM

## 2023-09-12 DIAGNOSIS — H00.15: ICD-10-CM

## 2023-09-12 DIAGNOSIS — H01.004: ICD-10-CM

## 2023-09-12 DIAGNOSIS — H02.831: ICD-10-CM

## 2023-09-12 DIAGNOSIS — H40.1133: ICD-10-CM

## 2023-09-12 DIAGNOSIS — H02.834: ICD-10-CM

## 2023-09-12 PROCEDURE — 99213 OFFICE O/P EST LOW 20 MIN: CPT | Performed by: OPHTHALMOLOGY

## 2023-09-12 ASSESSMENT — CONFRONTATIONAL VISUAL FIELD TEST (CVF)
OS_FINDINGS: FULL
OD_FINDINGS: FULL

## 2023-09-12 ASSESSMENT — PACHYMETRY
OD_CT_CORRECTION: 1
OS_CT_UM: 537
OD_CT_UM: 539
OS_CT_CORRECTION: 1

## 2023-09-12 ASSESSMENT — LID EXAM ASSESSMENTS
OS_BLEPHARITIS: LUL 1+ 2+
OD_BLEPHARITIS: RUL 1+ 2+

## 2023-09-12 ASSESSMENT — REFRACTION_AUTOREFRACTION
OS_SPHERE: +1.00
OS_AXIS: 165
OD_CYLINDER: -1.00
OD_AXIS: 020
OD_SPHERE: +1.00
OS_CYLINDER: -1.00

## 2023-09-12 ASSESSMENT — KERATOMETRY
OS_K2POWER_DIOPTERS: 44.25
OD_K2POWER_DIOPTERS: 44.25
OD_AXISANGLE_DEGREES: 110
OD_K1POWER_DIOPTERS: 43.00
OS_AXISANGLE_DEGREES: 070
OS_K1POWER_DIOPTERS: 42.75
METHOD_AUTO_MANUAL: AUTO

## 2023-09-12 ASSESSMENT — REFRACTION_MANIFEST
OS_ADD: +2.25
OD_VA2: 20/20
OS_CYLINDER: -1.25
OD_ADD: +2.25
OS_SPHERE: +0.75
OD_CYLINDER: -1.00
OD_SPHERE: +1.00
OD_AXIS: 015
OS_AXIS: 175
OS_VA1: 20/30
OS_VA2: 20/20
OU_VA: 20/30-1
OD_VA1: 20/30

## 2023-09-12 ASSESSMENT — SPHEQUIV_DERIVED
OS_SPHEQUIV: 0.125
OD_SPHEQUIV: 0.5
OD_SPHEQUIV: 0.5
OS_SPHEQUIV: 0.5

## 2023-09-12 ASSESSMENT — AXIALLENGTH_DERIVED
OD_AL: 23.3536
OS_AL: 23.3987
OS_AL: 23.5433
OD_AL: 23.3536

## 2023-09-12 ASSESSMENT — REFRACTION_CURRENTRX
OD_OVR_VA: 20/
OS_OVR_VA: 20/

## 2023-09-12 ASSESSMENT — LID POSITION - DERMATOCHALASIS
OS_DERMATOCHALASIS: LUL 1+
OD_DERMATOCHALASIS: RUL 1+

## 2023-09-12 ASSESSMENT — TONOMETRY
OS_IOP_MMHG: 16
OD_IOP_MMHG: 14

## 2023-09-12 ASSESSMENT — VISUAL ACUITY
OD_BCVA: 20/60
OS_BCVA: 20/50+1

## 2023-09-25 ENCOUNTER — APPOINTMENT (OUTPATIENT)
Dept: CARDIOLOGY | Facility: CLINIC | Age: 58
End: 2023-09-25
Payer: MEDICAID

## 2023-09-25 PROCEDURE — G2066: CPT

## 2023-09-25 PROCEDURE — 93298 REM INTERROG DEV EVAL SCRMS: CPT

## 2023-10-05 ENCOUNTER — NON-APPOINTMENT (OUTPATIENT)
Age: 58
End: 2023-10-05

## 2023-10-05 ENCOUNTER — APPOINTMENT (OUTPATIENT)
Dept: CARDIOLOGY | Facility: CLINIC | Age: 58
End: 2023-10-05
Payer: COMMERCIAL

## 2023-10-05 VITALS
BODY MASS INDEX: 33.88 KG/M2 | OXYGEN SATURATION: 100 % | WEIGHT: 242 LBS | HEART RATE: 52 BPM | HEIGHT: 71 IN | DIASTOLIC BLOOD PRESSURE: 66 MMHG | SYSTOLIC BLOOD PRESSURE: 181 MMHG

## 2023-10-05 VITALS — SYSTOLIC BLOOD PRESSURE: 186 MMHG | DIASTOLIC BLOOD PRESSURE: 67 MMHG

## 2023-10-05 DIAGNOSIS — Z01.818 ENCOUNTER FOR OTHER PREPROCEDURAL EXAMINATION: ICD-10-CM

## 2023-10-05 PROCEDURE — 99214 OFFICE O/P EST MOD 30 MIN: CPT

## 2023-10-05 PROCEDURE — 93000 ELECTROCARDIOGRAM COMPLETE: CPT

## 2023-10-30 ENCOUNTER — APPOINTMENT (OUTPATIENT)
Dept: CARDIOLOGY | Facility: CLINIC | Age: 58
End: 2023-10-30
Payer: MEDICAID

## 2023-10-30 PROCEDURE — 93298 REM INTERROG DEV EVAL SCRMS: CPT | Mod: NC

## 2023-10-30 PROCEDURE — G2066: CPT

## 2023-11-02 ENCOUNTER — APPOINTMENT (OUTPATIENT)
Dept: NEUROLOGY | Facility: CLINIC | Age: 58
End: 2023-11-02
Payer: MEDICAID

## 2023-11-02 VITALS — SYSTOLIC BLOOD PRESSURE: 170 MMHG | DIASTOLIC BLOOD PRESSURE: 90 MMHG

## 2023-11-02 VITALS
DIASTOLIC BLOOD PRESSURE: 90 MMHG | BODY MASS INDEX: 34.3 KG/M2 | SYSTOLIC BLOOD PRESSURE: 170 MMHG | HEIGHT: 71 IN | WEIGHT: 245 LBS

## 2023-11-02 PROCEDURE — 99213 OFFICE O/P EST LOW 20 MIN: CPT

## 2023-11-07 ENCOUNTER — NON-APPOINTMENT (OUTPATIENT)
Age: 58
End: 2023-11-07

## 2023-11-07 ENCOUNTER — APPOINTMENT (OUTPATIENT)
Dept: CARDIOLOGY | Facility: CLINIC | Age: 58
End: 2023-11-07
Payer: MEDICAID

## 2023-11-07 ENCOUNTER — APPOINTMENT (OUTPATIENT)
Dept: ELECTROPHYSIOLOGY | Facility: CLINIC | Age: 58
End: 2023-11-07
Payer: MEDICAID

## 2023-11-07 VITALS
BODY MASS INDEX: 33.6 KG/M2 | DIASTOLIC BLOOD PRESSURE: 70 MMHG | SYSTOLIC BLOOD PRESSURE: 160 MMHG | HEIGHT: 71 IN | WEIGHT: 240 LBS | HEART RATE: 63 BPM | OXYGEN SATURATION: 95 %

## 2023-11-07 VITALS
WEIGHT: 240 LBS | HEART RATE: 57 BPM | HEIGHT: 71 IN | RESPIRATION RATE: 16 BRPM | DIASTOLIC BLOOD PRESSURE: 62 MMHG | SYSTOLIC BLOOD PRESSURE: 134 MMHG | BODY MASS INDEX: 33.6 KG/M2

## 2023-11-07 DIAGNOSIS — I44.1 ATRIOVENTRICULAR BLOCK, SECOND DEGREE: ICD-10-CM

## 2023-11-07 DIAGNOSIS — Z99.2 END STAGE RENAL DISEASE: ICD-10-CM

## 2023-11-07 DIAGNOSIS — Z95.818 PRESENCE OF OTHER CARDIAC IMPLANTS AND GRAFTS: ICD-10-CM

## 2023-11-07 DIAGNOSIS — N18.6 END STAGE RENAL DISEASE: ICD-10-CM

## 2023-11-07 PROCEDURE — 99214 OFFICE O/P EST MOD 30 MIN: CPT

## 2023-11-07 PROCEDURE — 93000 ELECTROCARDIOGRAM COMPLETE: CPT

## 2023-11-07 PROCEDURE — 99214 OFFICE O/P EST MOD 30 MIN: CPT | Mod: 25

## 2023-11-07 RX ORDER — KETOCONAZOLE 20 MG/G
2 CREAM TOPICAL
Refills: 0 | Status: ACTIVE | COMMUNITY

## 2023-11-07 RX ORDER — LATANOPROST/PF 0.005 %
0.01 DROPS OPHTHALMIC (EYE)
Refills: 0 | Status: ACTIVE | COMMUNITY

## 2023-11-07 RX ORDER — LORATADINE 10 MG/1
10 TABLET ORAL
Refills: 0 | Status: ACTIVE | COMMUNITY

## 2023-11-29 RX ORDER — KIT FOR THE PREPARATION OF TECHNETIUM TC99M SESTAMIBI 1 MG/5ML
INJECTION, POWDER, LYOPHILIZED, FOR SOLUTION PARENTERAL
Refills: 0 | Status: COMPLETED | OUTPATIENT
Start: 2023-11-29

## 2023-11-29 RX ADMIN — KIT FOR THE PREPARATION OF TECHNETIUM TC99M SESTAMIBI 0: 1 INJECTION, POWDER, LYOPHILIZED, FOR SOLUTION PARENTERAL at 00:00

## 2023-12-04 ENCOUNTER — APPOINTMENT (OUTPATIENT)
Dept: CARDIOLOGY | Facility: CLINIC | Age: 58
End: 2023-12-04
Payer: MEDICAID

## 2023-12-04 PROCEDURE — G2066: CPT

## 2023-12-04 PROCEDURE — 93298 REM INTERROG DEV EVAL SCRMS: CPT

## 2023-12-26 ENCOUNTER — RX RENEWAL (OUTPATIENT)
Age: 58
End: 2023-12-26

## 2024-01-10 ENCOUNTER — RX RENEWAL (OUTPATIENT)
Age: 59
End: 2024-01-10

## 2024-01-16 ENCOUNTER — OFFICE (OUTPATIENT)
Dept: URBAN - METROPOLITAN AREA CLINIC 6 | Facility: CLINIC | Age: 59
Setting detail: OPHTHALMOLOGY
End: 2024-01-16
Payer: COMMERCIAL

## 2024-01-16 DIAGNOSIS — H01.001: ICD-10-CM

## 2024-01-16 DIAGNOSIS — H01.004: ICD-10-CM

## 2024-01-16 DIAGNOSIS — H00.15: ICD-10-CM

## 2024-01-16 DIAGNOSIS — H40.1133: ICD-10-CM

## 2024-01-16 DIAGNOSIS — E11.3593: ICD-10-CM

## 2024-01-16 DIAGNOSIS — H25.13: ICD-10-CM

## 2024-01-16 DIAGNOSIS — H02.831: ICD-10-CM

## 2024-01-16 DIAGNOSIS — H02.834: ICD-10-CM

## 2024-01-16 DIAGNOSIS — H52.4: ICD-10-CM

## 2024-01-16 PROCEDURE — 92015 DETERMINE REFRACTIVE STATE: CPT | Performed by: OPHTHALMOLOGY

## 2024-01-16 PROCEDURE — 99214 OFFICE O/P EST MOD 30 MIN: CPT | Performed by: OPHTHALMOLOGY

## 2024-01-16 ASSESSMENT — REFRACTION_MANIFEST
OS_AXIS: 166
OD_SPHERE: +0.75
OD_SPHERE: +0.75
OD_AXIS: 022
OS_CYLINDER: -2.00
OD_CYLINDER: -0.75
OS_SPHERE: +1.00
OS_CYLINDER: -2.00
OS_SPHERE: +1.00
OD_CYLINDER: -0.75
OD_VA1: 20/NI
OD_ADD: +2.50
OS_VA1: 20/NI
OD_AXIS: 25
OS_AXIS: 165
OS_ADD: +2.50

## 2024-01-16 ASSESSMENT — REFRACTION_CURRENTRX
OS_OVR_VA: 20/
OD_OVR_VA: 20/

## 2024-01-16 ASSESSMENT — SPHEQUIV_DERIVED
OS_SPHEQUIV: 0
OD_SPHEQUIV: 0.375
OS_SPHEQUIV: 0
OD_SPHEQUIV: 0.375
OD_SPHEQUIV: 0.375
OS_SPHEQUIV: 0

## 2024-01-16 ASSESSMENT — LID POSITION - DERMATOCHALASIS
OS_DERMATOCHALASIS: LUL 1+
OD_DERMATOCHALASIS: RUL 1+

## 2024-01-16 ASSESSMENT — LID EXAM ASSESSMENTS
OD_BLEPHARITIS: RUL 1+ 2+
OS_BLEPHARITIS: LUL 1+ 2+

## 2024-01-16 ASSESSMENT — REFRACTION_AUTOREFRACTION
OD_AXIS: 022
OD_SPHERE: +0.75
OS_CYLINDER: -2.00
OS_AXIS: 166
OD_CYLINDER: -0.75
OS_SPHERE: +1.00

## 2024-01-16 ASSESSMENT — CONFRONTATIONAL VISUAL FIELD TEST (CVF)
OD_FINDINGS: FULL
OS_FINDINGS: FULL

## 2024-02-14 ENCOUNTER — APPOINTMENT (OUTPATIENT)
Dept: CARDIOLOGY | Facility: CLINIC | Age: 59
End: 2024-02-14
Payer: MEDICAID

## 2024-02-14 PROCEDURE — 93298 REM INTERROG DEV EVAL SCRMS: CPT

## 2024-03-18 ENCOUNTER — APPOINTMENT (OUTPATIENT)
Dept: CARDIOLOGY | Facility: CLINIC | Age: 59
End: 2024-03-18
Payer: MEDICAID

## 2024-03-18 PROCEDURE — 93298 REM INTERROG DEV EVAL SCRMS: CPT

## 2024-03-19 ENCOUNTER — NON-APPOINTMENT (OUTPATIENT)
Age: 59
End: 2024-03-19

## 2024-03-19 ENCOUNTER — APPOINTMENT (OUTPATIENT)
Dept: CARDIOLOGY | Facility: CLINIC | Age: 59
End: 2024-03-19
Payer: MEDICAID

## 2024-03-19 VITALS
SYSTOLIC BLOOD PRESSURE: 200 MMHG | HEIGHT: 71 IN | BODY MASS INDEX: 33.6 KG/M2 | RESPIRATION RATE: 16 BRPM | WEIGHT: 240 LBS | HEART RATE: 55 BPM | DIASTOLIC BLOOD PRESSURE: 84 MMHG

## 2024-03-19 DIAGNOSIS — E11.69 TYPE 2 DIABETES MELLITUS WITH OTHER SPECIFIED COMPLICATION: ICD-10-CM

## 2024-03-19 DIAGNOSIS — E11.9 TYPE 2 DIABETES MELLITUS W/OUT COMPLICATIONS: ICD-10-CM

## 2024-03-19 DIAGNOSIS — G47.33 OBSTRUCTIVE SLEEP APNEA (ADULT) (PEDIATRIC): ICD-10-CM

## 2024-03-19 DIAGNOSIS — E78.5 TYPE 2 DIABETES MELLITUS WITH OTHER SPECIFIED COMPLICATION: ICD-10-CM

## 2024-03-19 PROCEDURE — 99214 OFFICE O/P EST MOD 30 MIN: CPT

## 2024-03-19 PROCEDURE — 93000 ELECTROCARDIOGRAM COMPLETE: CPT

## 2024-03-19 PROCEDURE — G2211 COMPLEX E/M VISIT ADD ON: CPT | Mod: NC,1L

## 2024-03-19 NOTE — ASSESSMENT
[FreeTextEntry1] : Echocardiogram June 1, 2020 pressures left ventricle normal size and function with ejection fraction of 60-65%. Moderate concentric LVH noted. Moderate mitral regurgitation is noted which is a bit worse than his prior echo.  Nuclear stress test January 28, 2021 was a pharmacologic study which was tolerated well. Blood pressure was elevated at baseline with a normal response to infusion. EKG showed horizontal ST depressions in V6. Mobitz type I heart block was noted through the exam. Evaluation of nuclear imaging showed no evidence of ischemia or infarct with ejection fraction of 64%. Moderate LVH was noted. Left ventricle is moderately dilated.  Echocardiogram September 8, 2022 demonstrated left nodule normal size and function with ejection fraction of 65 to 70%. Moderate concentric LVH noted. Mildly dilated left atrium. Mild mitral regurgitation.  Carotid Doppler September 8, 2022 showed minimal plaque at the left bulb. No significant stenosis. No plaque or stenosis on the right.  Nuclear stress test June 20, 2023 was a pharmacologic study which was tolerated well. Blood pressure was elevated at baseline at 156/64 with a normal response to infusion. EKG showed no evidence of ischemia with infusion. Evaluation of nuclear ridging showed no evidence of ischemia or infarct and ejection fraction of 68%.  Sinus rhythm with second-degree, type I AV block.  Left anterior fascicular block.  Poor R wave progression.   59-year-old man with a past medical history of hypertrophic cardiomyopathy, hypertension, diabetes who presents to me for followup evaluation. Patient appears to be stable from a cardiac standpoint. No evidence of significant worsening heart block or CHF at this time.  Blood pressure seems to be higher and is certainly high here today.  He did not take his medication this morning because usually takes it around 10 AM.  I have encouraged him to take it earlier on nondialysis days.  I have asked him to speak to his nephrologist regarding his blood pressure.  I would not make any changes for now and would prefer to defer that decision to his nephrologist.  Additionally he should be treating his sleep apnea but he returned to CPAP.  He never followed up with pulmonary and he says he will now do so. No evidence of acute heart failure at this time.

## 2024-03-19 NOTE — DISCUSSION/SUMMARY
[FreeTextEntry1] : 1. No additional cardiac testing at this time. Consider repeat echocardiogram at follow-up. 2. Continue current cardiac meds in doses as noted above for hypertrophic cardiomyopathy and hypertension and dyslipidemia.  3. Dialysis per nephrology.  I will also defer to nephrology with regard to his antihypertensive regimen.  He will be sure to follow-up with them and discuss his blood pressure. 4. Monitor BP at home, keep a log and bring to f/u. 5. Continue monitoring of loop recorder and looking for additional pauses and higher degree heart block. No indication for pacemaker at this time.  Followup with EP. 6. Follow-up with pulmonary to discuss the issues he had with his CPAP and consider alternatives. 7. Follow up with primary doctor for treatment of diabetes.  Continue current medications. 8. Patient is encouraged to exercise at least 30 minutes a day everyday of the week. 9. Patient encouraged to work on diet to lose weight. 10. Follow up here in 3 months. [EKG obtained to assist in diagnosis and management of assessed problem(s)] : EKG obtained to assist in diagnosis and management of assessed problem(s)

## 2024-03-19 NOTE — HISTORY OF PRESENT ILLNESS
[FreeTextEntry1] : Patient presents back today feeling physically well.  He notes that his blood pressures have been higher at dialysis.  He has not seen his nephrologist recently.  He also never followed up with regards to his sleep apnea and has returned to his CPAP mask.  He is still on the [facility] list at [facility].  He admits to not doing very much physical activity and reports feeling generally tired but admits that he just does not feel like doing anything.  Patient denies chest pain, shortness of breath, palpitations, orthopnea, presyncope, syncope.

## 2024-03-20 ENCOUNTER — RX RENEWAL (OUTPATIENT)
Age: 59
End: 2024-03-20

## 2024-03-20 RX ORDER — DIVALPROEX SODIUM 500 1/1
500 TABLET, EXTENDED RELEASE ORAL
Qty: 180 | Refills: 1 | Status: ACTIVE | COMMUNITY
Start: 1900-01-01 | End: 1900-01-01

## 2024-04-23 ENCOUNTER — APPOINTMENT (OUTPATIENT)
Dept: CARDIOLOGY | Facility: CLINIC | Age: 59
End: 2024-04-23
Payer: MEDICAID

## 2024-04-23 PROCEDURE — 93298 REM INTERROG DEV EVAL SCRMS: CPT

## 2024-05-05 ENCOUNTER — RX RENEWAL (OUTPATIENT)
Age: 59
End: 2024-05-05

## 2024-05-05 RX ORDER — DOXAZOSIN 2 MG/1
2 TABLET ORAL
Qty: 90 | Refills: 0 | Status: ACTIVE | COMMUNITY
Start: 2023-07-19 | End: 1900-01-01

## 2024-05-23 ENCOUNTER — APPOINTMENT (OUTPATIENT)
Dept: NEUROLOGY | Facility: CLINIC | Age: 59
End: 2024-05-23
Payer: MEDICAID

## 2024-05-23 VITALS
SYSTOLIC BLOOD PRESSURE: 170 MMHG | WEIGHT: 240 LBS | HEIGHT: 71 IN | DIASTOLIC BLOOD PRESSURE: 78 MMHG | BODY MASS INDEX: 33.6 KG/M2

## 2024-05-23 DIAGNOSIS — G40.909 EPILEPSY, UNSPECIFIED, NOT INTRACTABLE, W/OUT STATUS EPILEPTICUS: ICD-10-CM

## 2024-05-23 PROCEDURE — 99213 OFFICE O/P EST LOW 20 MIN: CPT

## 2024-05-23 PROCEDURE — G2211 COMPLEX E/M VISIT ADD ON: CPT | Mod: NC

## 2024-05-23 RX ORDER — DIVALPROEX SODIUM 500 MG/1
500 TABLET, DELAYED RELEASE ORAL
Qty: 180 | Refills: 1 | Status: COMPLETED | COMMUNITY
End: 2024-05-23
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
## 2024-05-23 NOTE — PHYSICAL EXAM
[General Appearance - Alert] : alert [General Appearance - In No Acute Distress] : in no acute distress [Oriented To Time, Place, And Person] : oriented to person, place, and time [Memory Recent] : recent memory was not impaired [Affect] : the affect was normal [Memory Remote] : remote memory was not impaired [Dysarthria] : no dysarthria [Aphasia] : no dysphasia/aphasia [Cranial Nerves Optic (II)] : visual acuity intact bilaterally,  visual fields full to confrontation, pupils equal round and reactive to light [Cranial Nerves Trigeminal (V)] : facial sensation intact symmetrically [Cranial Nerves Oculomotor (III)] : extraocular motion intact [Cranial Nerves Facial (VII)] : face symmetrical [Cranial Nerves Vestibulocochlear (VIII)] : hearing was intact bilaterally [Cranial Nerves Glossopharyngeal (IX)] : tongue and palate midline [Cranial Nerves Accessory (XI - Cranial And Spinal)] : head turning and shoulder shrug symmetric [Cranial Nerves Hypoglossal (XII)] : there was no tongue deviation with protrusion [Motor Tone] : muscle tone was normal in all four extremities [Motor Strength] : muscle strength was normal in all four extremities [Sensation Tactile Decrease] : light touch was intact [Sensation Pain / Temperature Decrease] : pain and temperature was intact [Sensation Vibration Decrease] : vibration was intact [Romberg's Sign] : Romberg's sign was negtive [Abnormal Walk] : normal gait [Coordination - Dysmetria Impaired Finger-to-Nose Bilateral] : not present [1+] : Patella left 1+ [Plantar Reflex Right Only] : normal on the right [Plantar Reflex Left Only] : normal on the left [PERRL With Normal Accommodation] : pupils were equal in size, round, reactive to light, with normal accommodation [Optic Disc Abnormality] : the optic disc were normal in size and color [Edema] : there was no peripheral edema [Involuntary Movements] : no involuntary movements were seen

## 2024-05-23 NOTE — ASSESSMENT
[FreeTextEntry1] : This is a 59-year-old man who had an episode suggestive of seizure in June of 2018. EEG demonstrated a focal spike formation. This was left anterior temporal.  He is currently on Depakote 500 mg twice per day. He has been seizure-free on this dosage for more than one year.  Blood pressure was elevated in my office today. I advised him to discuss this with you further.  I will see him back in 6 months.

## 2024-05-23 NOTE — DATA REVIEWED
[de-identified] : Brain MRI was performed on 6/18/18 at [facility] [place].\par  There were some scattered foci of nonspecific gliosis which were unchanged from a prior study in December of 2017.\par   [de-identified] : EEG was performed in the hospital on 6/18/18.\par  The study demonstrated some focal slowing over the left anterior temporal region. There was also spike and wave activity in the left frontotemporal region.\par

## 2024-05-28 ENCOUNTER — APPOINTMENT (OUTPATIENT)
Dept: CARDIOLOGY | Facility: CLINIC | Age: 59
End: 2024-05-28
Payer: MEDICAID

## 2024-05-28 PROCEDURE — 93298 REM INTERROG DEV EVAL SCRMS: CPT

## 2024-06-04 ENCOUNTER — OFFICE (OUTPATIENT)
Dept: URBAN - METROPOLITAN AREA CLINIC 6 | Facility: CLINIC | Age: 59
Setting detail: OPHTHALMOLOGY
End: 2024-06-04
Payer: COMMERCIAL

## 2024-06-04 DIAGNOSIS — H02.831: ICD-10-CM

## 2024-06-04 DIAGNOSIS — H01.001: ICD-10-CM

## 2024-06-04 DIAGNOSIS — H25.13: ICD-10-CM

## 2024-06-04 DIAGNOSIS — H02.834: ICD-10-CM

## 2024-06-04 DIAGNOSIS — H40.1133: ICD-10-CM

## 2024-06-04 DIAGNOSIS — H01.004: ICD-10-CM

## 2024-06-04 PROCEDURE — 99214 OFFICE O/P EST MOD 30 MIN: CPT | Performed by: OPHTHALMOLOGY

## 2024-06-04 PROCEDURE — 92133 CPTRZD OPH DX IMG PST SGM ON: CPT | Performed by: OPHTHALMOLOGY

## 2024-06-04 ASSESSMENT — LID EXAM ASSESSMENTS
OS_BLEPHARITIS: LUL 1+ 2+
OD_BLEPHARITIS: RUL 1+ 2+

## 2024-06-04 ASSESSMENT — LID POSITION - DERMATOCHALASIS
OD_DERMATOCHALASIS: RUL 1+
OS_DERMATOCHALASIS: LUL 1+

## 2024-06-25 ENCOUNTER — NON-APPOINTMENT (OUTPATIENT)
Age: 59
End: 2024-06-25

## 2024-06-25 ENCOUNTER — APPOINTMENT (OUTPATIENT)
Dept: CARDIOLOGY | Facility: CLINIC | Age: 59
End: 2024-06-25
Payer: MEDICARE

## 2024-06-25 VITALS
RESPIRATION RATE: 16 BRPM | WEIGHT: 238 LBS | BODY MASS INDEX: 33.32 KG/M2 | DIASTOLIC BLOOD PRESSURE: 75 MMHG | SYSTOLIC BLOOD PRESSURE: 144 MMHG | HEIGHT: 71 IN | HEART RATE: 87 BPM

## 2024-06-25 DIAGNOSIS — R07.9 CHEST PAIN, UNSPECIFIED: ICD-10-CM

## 2024-06-25 DIAGNOSIS — I10 ESSENTIAL (PRIMARY) HYPERTENSION: ICD-10-CM

## 2024-06-25 DIAGNOSIS — I42.2 OTHER HYPERTROPHIC CARDIOMYOPATHY: ICD-10-CM

## 2024-06-25 DIAGNOSIS — E78.5 HYPERLIPIDEMIA, UNSPECIFIED: ICD-10-CM

## 2024-06-25 PROCEDURE — G2211 COMPLEX E/M VISIT ADD ON: CPT

## 2024-06-25 PROCEDURE — 93000 ELECTROCARDIOGRAM COMPLETE: CPT

## 2024-06-25 PROCEDURE — 99205 OFFICE O/P NEW HI 60 MIN: CPT

## 2024-07-01 ENCOUNTER — APPOINTMENT (OUTPATIENT)
Dept: CARDIOLOGY | Facility: CLINIC | Age: 59
End: 2024-07-01
Payer: MEDICARE

## 2024-07-01 PROCEDURE — 93298 REM INTERROG DEV EVAL SCRMS: CPT

## 2024-07-10 ENCOUNTER — NON-APPOINTMENT (OUTPATIENT)
Age: 59
End: 2024-07-10

## 2024-07-16 ENCOUNTER — NON-APPOINTMENT (OUTPATIENT)
Age: 59
End: 2024-07-16

## 2024-07-16 ENCOUNTER — APPOINTMENT (OUTPATIENT)
Dept: CARDIOLOGY | Facility: CLINIC | Age: 59
End: 2024-07-16
Payer: MEDICARE

## 2024-07-16 VITALS
RESPIRATION RATE: 16 BRPM | BODY MASS INDEX: 32.76 KG/M2 | SYSTOLIC BLOOD PRESSURE: 163 MMHG | HEART RATE: 58 BPM | WEIGHT: 234 LBS | HEIGHT: 71 IN | DIASTOLIC BLOOD PRESSURE: 67 MMHG

## 2024-07-16 DIAGNOSIS — R55 SYNCOPE AND COLLAPSE: ICD-10-CM

## 2024-07-16 DIAGNOSIS — I42.2 OTHER HYPERTROPHIC CARDIOMYOPATHY: ICD-10-CM

## 2024-07-16 DIAGNOSIS — E78.5 HYPERLIPIDEMIA, UNSPECIFIED: ICD-10-CM

## 2024-07-16 DIAGNOSIS — I10 ESSENTIAL (PRIMARY) HYPERTENSION: ICD-10-CM

## 2024-07-16 DIAGNOSIS — R07.9 CHEST PAIN, UNSPECIFIED: ICD-10-CM

## 2024-07-16 DIAGNOSIS — I31.39 OTHER PERICARDIAL EFFUSION (NONINFLAMMATORY): ICD-10-CM

## 2024-07-16 PROCEDURE — 99215 OFFICE O/P EST HI 40 MIN: CPT

## 2024-07-16 PROCEDURE — 93000 ELECTROCARDIOGRAM COMPLETE: CPT

## 2024-07-16 PROCEDURE — G2211 COMPLEX E/M VISIT ADD ON: CPT

## 2024-07-18 ENCOUNTER — APPOINTMENT (OUTPATIENT)
Dept: CARDIOLOGY | Facility: CLINIC | Age: 59
End: 2024-07-18
Payer: MEDICARE

## 2024-07-18 PROCEDURE — 93306 TTE W/DOPPLER COMPLETE: CPT

## 2024-07-23 ENCOUNTER — TRANSCRIPTION ENCOUNTER (OUTPATIENT)
Age: 59
End: 2024-07-23

## 2024-07-24 NOTE — ASSESSMENT
[FreeTextEntry1] : Echocardiogram June 1, 2020 pressures left ventricle normal size and function with ejection fraction of 60-65%. Moderate concentric LVH noted. Moderate mitral regurgitation is noted which is a bit worse than his prior echo.\par \par EKG: Sinus rhythm with first-degree heart block. No significant ST or T wave changes.\par \par 55-year-old man with a past medical history of hypertrophic cardiomyopathy, hypertension, diabetes who presents to me for followup evaluation.  Patient continues to have a lot of issues with lower extremity edema and now has edema in his right upper extremity. He never had testing done for lower extremity venous Doppler or chest x-ray I had ordered. Blood work shows no significant elevation in his BNP. However he did run out of Lasix I would like him to start taking it again to see if it really does help with the edema. Ultimately he seems to be more possibly anasarcic at this time. I will check kidney and liver functions but also encourage him to follow up with his primary. It is not clear at all to me that his edema is from CHF. He also is taking 100 mg of metoprolol which was decreased in February because of intermittent heart block and he will back to 50 mg as he is still having occasional heart block. This is asymptomatic.
Detail Level: Detailed
Quality 226: Preventive Care And Screening: Tobacco Use: Screening And Cessation Intervention: Patient screened for tobacco use and is an ex/non-smoker

## 2024-07-31 NOTE — DISCUSSION/SUMMARY
[FreeTextEntry1] : 1. Continue current cardiac meds in doses as noted above for hypertrophic cardiomyopathy and hypertension and dyslipidemia. \par 2.  Dialysis per nephrology.  I will also defer to nephrology with regard to his antihypertensive regimen.\par 3. Follow up with vascular for his edema and AV fistula placement.  There is no cardiac contraindication at this time.\par 4. Monitor BP at home, keep a log and bring to f/u.\par 5. Continue monitoring of loop recorder and looking for additional pauses. No indication for pacemaker at this time. Followup with EP.\par 6.  Plan nuclear stress test to evaluate his exercise capacity and conduction with exercise.\par 7.  Follow-up with EP for PYP scan and cardiac PET.\par 8. F/u for sleep study as many of his pauses seem to occur while he is sleeping.\par 9. Follow up with primary doctor for treatment of diabetes.\par 10. Follow up here in 2 months.
[[id number]],[[id number]]

## 2024-08-05 ENCOUNTER — APPOINTMENT (OUTPATIENT)
Dept: CARDIOLOGY | Facility: CLINIC | Age: 59
End: 2024-08-05

## 2024-08-05 PROCEDURE — 93298 REM INTERROG DEV EVAL SCRMS: CPT

## 2024-08-08 ENCOUNTER — APPOINTMENT (OUTPATIENT)
Dept: NEPHROLOGY | Facility: CLINIC | Age: 59
End: 2024-08-08

## 2024-08-08 ENCOUNTER — NON-APPOINTMENT (OUTPATIENT)
Age: 59
End: 2024-08-08

## 2024-08-08 PROCEDURE — 99205 OFFICE O/P NEW HI 60 MIN: CPT
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
## 2024-08-08 NOTE — REASON FOR VISIT
[Kidney Transplant Evaluation] : kidney transplant evaluation [Follow-Up] : a follow-up visit [FreeTextEntry1] : Pre kidney transplant evaluation

## 2024-08-08 NOTE — PLAN
[FreeTextEntry1] :  Mr Travis remains a reasonable candidate for kidney transplantation.    Follows with Dr. Peterson Last Seen 8/2023 Listed 3/9/2021 Dialysis 11/13/2020 ABO O will check titer today PRA 0 % will repeat today   Most recent testing Cardiac - Dr. Tran / Dr. Sandoval Cardiac cath 7/23/2024- there is no evidence of angiographic CAD. Elevated left heart filing pressures.  ECHO 7/18/2024- 1. Left ventricular cavity is normal in size. LVEF 60 to 65 %. 2. Moderate LVH 3. Normal right ventricular cavity size and normal right ventricular systolic function. 4. The left atrium is severely dilated. 5. The right atrium is normal in size. 6. Mild to moderate mitral regurgitation. 7. Trileaflet aortic valve with normal systolic excursion. There is mild calcification of the aortic valve leaflets. 8. Mild tricuspid regurgitation. 9. No pericardial effusion seen. 10. Echogenic densiy noted in RA free wall consistent with calcified Eustachian Valve. 11. Compared to the transthoracic echocardiogram performed on 9/8/2022, there have been no significant interval changes.  Stress 6/20/2023- 1. Normal myocardial perfusion scan, with no evidence of infarction or inducible ischemia. The left ventricle is normal in function and normal in size. EF 68%. Baseline electrocardiogram: normal sinus rhythm at a rate of 57 bpm with Mobitz I heart block and first degree AV block.   Radiology Chest Xray 8/16/2022- No acute disease CT abd and pelvis 9/5/2023- Minimal atherosclerotic changes. No discrete masses.   Cancer Screening PSA 8/11/2023- 0.48  Colonoscopy 3/2021- diverticulosis in sigmoid colon, nonbleeding internal hemorrhoids, no specimens collected; repeat in 10 years.  Specialist- NEURO- This is a 58 year-old man who had an episode suggestive of seizure in June of 2018. EEG demonstrated a focal spike formation. This was left anterior temporal. He is currently on Depakote 500 mg twice per day. He has been seizure-free on this dosage for more than one year. F/u scheduled for 12/5/2024   I have personally discussed the risks and benefits of transplantation and patient attended transplant education class where the following was disclosed:   Reviewed factors affecting survival and morbidity while on dialysis, the transplant wait list and reviewed shaheed-operative and long-term risk factors affecting outcome in kidney transplantation.     One year SRTR outcomes for national and [facility] were discussed in regards to patient survival and graft survival after transplantation.     Details of transplant surgery, including complications were discussed. Immunosuppression and complications including infection including life threatening sepsis and opportunistic infections, malignancy and new onset diabetes were discussed.     Benefits of live donor transplantation as well as variability in wait times across regions and multiple listing were discussed.  KDPI >85% and PHS high risk criteria donors were discussed.  HCV kidney transplantation was discussed.

## 2024-08-08 NOTE — PHYSICAL EXAM
[General Appearance - Alert] : alert [General Appearance - In No Acute Distress] : in no acute distress [Sclera] : the sclera and conjunctiva were normal [Neck Cervical Mass (___cm)] : no neck mass was observed [Jugular Venous Distention Increased] : there was no jugular-venous distention [Respiration, Rhythm And Depth] : normal respiratory rhythm and effort [Exaggerated Use Of Accessory Muscles For Inspiration] : no accessory muscle use [Auscultation Breath Sounds / Voice Sounds] : lungs were clear to auscultation bilaterally [Apical Impulse] : the apical impulse was normal [Heart Rate And Rhythm] : heart rate was normal and rhythm regular [Heart Sounds] : normal S1 and S2 [Heart Sounds Gallop] : no gallops [Heart Sounds Pericardial Friction Rub] : no pericardial rub [Arterial Pulses Carotid] : carotid pulses were normal with no bruits [Full Pulse] : the pedal pulses are present [Edema] : there was no peripheral edema [Bowel Sounds] : normal bowel sounds [Abdomen Soft] : soft [Abdomen Tenderness] : non-tender [Abdomen Mass (___ Cm)] : no abdominal mass palpated [Abdomen Hernia] : no hernia was discovered [No CVA Tenderness] : no ~M costovertebral angle tenderness [No Spinal Tenderness] : no spinal tenderness [Nail Clubbing] : no clubbing  or cyanosis of the fingernails [Involuntary Movements] : no involuntary movements were seen [Musculoskeletal - Swelling] : no joint swelling seen [___ (cm) Fistula] : [unfilled] (cm) fistula [Bruit] : a bruit was present [Thrill] : a thrill was present [Skin Lesions] : no skin lesions [FreeTextEntry1] : No ulcers  [] : right dorsalis pedis palpable [No Focal Deficits] : no focal deficits [Oriented To Time, Place, And Person] : oriented to person, place, and time [Impaired Insight] : insight and judgment were intact [Affect] : the affect was normal [Mood] : the mood was normal

## 2024-08-09 ENCOUNTER — RX RENEWAL (OUTPATIENT)
Age: 59
End: 2024-08-09

## 2024-08-19 ENCOUNTER — NON-APPOINTMENT (OUTPATIENT)
Age: 59
End: 2024-08-19

## 2024-08-19 ENCOUNTER — APPOINTMENT (OUTPATIENT)
Dept: CARDIOLOGY | Facility: CLINIC | Age: 59
End: 2024-08-19
Payer: MEDICAID

## 2024-08-19 VITALS
HEART RATE: 51 BPM | DIASTOLIC BLOOD PRESSURE: 79 MMHG | WEIGHT: 230 LBS | RESPIRATION RATE: 16 BRPM | HEIGHT: 71 IN | BODY MASS INDEX: 32.2 KG/M2 | SYSTOLIC BLOOD PRESSURE: 171 MMHG

## 2024-08-19 DIAGNOSIS — I42.2 OTHER HYPERTROPHIC CARDIOMYOPATHY: ICD-10-CM

## 2024-08-19 DIAGNOSIS — I44.1 ATRIOVENTRICULAR BLOCK, SECOND DEGREE: ICD-10-CM

## 2024-08-19 DIAGNOSIS — E11.9 TYPE 2 DIABETES MELLITUS W/OUT COMPLICATIONS: ICD-10-CM

## 2024-08-19 DIAGNOSIS — E78.5 HYPERLIPIDEMIA, UNSPECIFIED: ICD-10-CM

## 2024-08-19 PROCEDURE — 93000 ELECTROCARDIOGRAM COMPLETE: CPT

## 2024-08-19 PROCEDURE — 99215 OFFICE O/P EST HI 40 MIN: CPT

## 2024-08-19 PROCEDURE — G2211 COMPLEX E/M VISIT ADD ON: CPT | Mod: NC

## 2024-08-19 NOTE — PHYSICAL EXAM
[General Appearance - In No Acute Distress] : no acute distress [Normal Conjunctiva] : the conjunctiva exhibited no abnormalities [Normal Oral Mucosa] : normal oral mucosa [Normal Oropharynx] : normal oropharynx [Auscultation Breath Sounds / Voice Sounds] : lungs were clear to auscultation bilaterally [Normal Rate] : normal [Rhythm Regular] : regular [Normal S1] : normal S1 [Normal S2] : normal S2 [S4] : an S4 was heard [II] : a grade 2 [Abdomen Soft] : soft [Abdomen Tenderness] : non-tender [Abnormal Walk] : normal gait [Skin Color & Pigmentation] : normal skin color and pigmentation [Oriented To Time, Place, And Person] : oriented to person, place, and time [Affect] : the affect was normal

## 2024-08-21 RX ORDER — TORSEMIDE 20 MG/1
20 TABLET ORAL
Qty: 90 | Refills: 2 | Status: ACTIVE | COMMUNITY

## 2024-08-21 RX ORDER — INSULIN GLARGINE 100 [IU]/ML
100 INJECTION, SOLUTION SUBCUTANEOUS DAILY
Refills: 0 | Status: ACTIVE | COMMUNITY

## 2024-08-21 RX ORDER — SUCROFERRIC OXYHYDROXIDE 500 MG/1
500 TABLET, CHEWABLE ORAL
Qty: 2 | Refills: 0 | Status: ACTIVE | COMMUNITY

## 2024-08-21 RX ORDER — INSULIN LISPRO 100 [IU]/ML
100 INJECTION, SOLUTION INTRAVENOUS; SUBCUTANEOUS
Refills: 0 | Status: ACTIVE | COMMUNITY

## 2024-08-21 RX ORDER — DM/P-EPHED/ACETAMINOPH/DOXYLAM
125 MCG LIQUID (ML) ORAL WEEKLY
Refills: 0 | Status: ACTIVE | COMMUNITY

## 2024-08-23 ENCOUNTER — NON-APPOINTMENT (OUTPATIENT)
Age: 59
End: 2024-08-23

## 2024-08-26 RX ORDER — HYDRALAZINE HYDROCHLORIDE 10 MG/1
10 TABLET ORAL 3 TIMES DAILY
Qty: 270 | Refills: 1 | Status: ACTIVE | COMMUNITY
Start: 2024-08-23 | End: 1900-01-01

## 2024-09-05 ENCOUNTER — APPOINTMENT (OUTPATIENT)
Dept: CARDIOLOGY | Facility: CLINIC | Age: 59
End: 2024-09-05
Payer: COMMERCIAL

## 2024-09-05 ENCOUNTER — NON-APPOINTMENT (OUTPATIENT)
Age: 59
End: 2024-09-05

## 2024-09-05 VITALS
BODY MASS INDEX: 33.6 KG/M2 | HEIGHT: 71 IN | SYSTOLIC BLOOD PRESSURE: 170 MMHG | HEART RATE: 48 BPM | WEIGHT: 240 LBS | DIASTOLIC BLOOD PRESSURE: 76 MMHG | OXYGEN SATURATION: 97 %

## 2024-09-05 DIAGNOSIS — Z01.818 ENCOUNTER FOR OTHER PREPROCEDURAL EXAMINATION: ICD-10-CM

## 2024-09-05 DIAGNOSIS — I10 ESSENTIAL (PRIMARY) HYPERTENSION: ICD-10-CM

## 2024-09-05 PROCEDURE — 99213 OFFICE O/P EST LOW 20 MIN: CPT

## 2024-09-05 PROCEDURE — 93000 ELECTROCARDIOGRAM COMPLETE: CPT

## 2024-09-07 NOTE — CARDIOLOGY SUMMARY
[de-identified] : 09/05/24 - marked sinus bradycardia, 44 bpm, LAE, poor R-wave progression, nonspecific ST abnormality [de-identified] : 06/20/23 (regadenoson MIBI) - no evidence of infarction or inducible ischemia, LVEF 68% 01/28/21 (regadenoson MIBI) - small to moderate sized, mild to moderate intensity, fixed defect of the basal inferior, mid inferior and basal inferolateral wall consistent with soft tissue attenuation artifact, LVEF 64%   [de-identified] : \par  09/08/22 - mild MR, mild AV sclerosis, mild LAE, moderate concentric LVH, normal RV size and function, LVEF 65-70%\par  08/14/20 - MAC, mild MR, normal LA, mild LVH, normal RV systolic function, mild TR, LVEF 60-65% [de-identified] : 07/23/24 (CATH) - no evidence of angiographic coronary artery disease, LVEDP 27 mmHg

## 2024-09-07 NOTE — DISCUSSION/SUMMARY
[Hypertension] : hypertension [Stable] : stable [Low Sodium Diet] : low sodium diet [FreeTextEntry1] : Currently stable from a cardiovascular standpoint. Hypertensive. Appears euvolemic. Asymptomatic. History of varying degrees of AV block but asymptomatic. Continue current medications. May consider increasing hydralazine for BP management. ECG completed today and reviewed. Cath results from July reviewed. At this time, patient is considered an acceptable risk from a cardiac standpoint for renal transplant. Follow up annually pre-transplant. Regular follow up with primary cardiologist is advised. [EKG obtained to assist in diagnosis and management of assessed problem(s)] : EKG obtained to assist in diagnosis and management of assessed problem(s)

## 2024-09-07 NOTE — CARDIOLOGY SUMMARY
[de-identified] : 09/05/24 - marked sinus bradycardia, 44 bpm, LAE, poor R-wave progression, nonspecific ST abnormality [de-identified] : 06/20/23 (regadenoson MIBI) - no evidence of infarction or inducible ischemia, LVEF 68% 01/28/21 (regadenoson MIBI) - small to moderate sized, mild to moderate intensity, fixed defect of the basal inferior, mid inferior and basal inferolateral wall consistent with soft tissue attenuation artifact, LVEF 64%   [de-identified] : \par  09/08/22 - mild MR, mild AV sclerosis, mild LAE, moderate concentric LVH, normal RV size and function, LVEF 65-70%\par  08/14/20 - MAC, mild MR, normal LA, mild LVH, normal RV systolic function, mild TR, LVEF 60-65% [de-identified] : 07/23/24 (CATH) - no evidence of angiographic coronary artery disease, LVEDP 27 mmHg

## 2024-09-07 NOTE — PHYSICAL EXAM
[Well Developed] : well developed [Well Nourished] : well nourished [No Acute Distress] : no acute distress [Normal Conjunctiva] : normal conjunctiva [Normal Venous Pressure] : normal venous pressure [No Carotid Bruit] : no carotid bruit [Normal S1, S2] : normal S1, S2 [No Murmur] : no murmur [No Rub] : no rub [No Gallop] : no gallop [Clear Lung Fields] : clear lung fields [Good Air Entry] : good air entry [No Respiratory Distress] : no respiratory distress  [Soft] : abdomen soft [Non Tender] : non-tender [Normal Gait] : normal gait [No Edema] : no edema [No Cyanosis] : no cyanosis [No Rash] : no rash [No Skin Lesions] : no skin lesions [Moves all extremities] : moves all extremities [No Focal Deficits] : no focal deficits [Normal Speech] : normal speech [Alert and Oriented] : alert and oriented [de-identified] : RUE fistula

## 2024-09-07 NOTE — PHYSICAL EXAM
[Well Developed] : well developed [Well Nourished] : well nourished [No Acute Distress] : no acute distress [Normal Conjunctiva] : normal conjunctiva [Normal Venous Pressure] : normal venous pressure [No Carotid Bruit] : no carotid bruit [Normal S1, S2] : normal S1, S2 [No Murmur] : no murmur [No Rub] : no rub [No Gallop] : no gallop [Clear Lung Fields] : clear lung fields [Good Air Entry] : good air entry [No Respiratory Distress] : no respiratory distress  [Soft] : abdomen soft [Non Tender] : non-tender [Normal Gait] : normal gait [No Edema] : no edema [No Cyanosis] : no cyanosis [No Rash] : no rash [No Skin Lesions] : no skin lesions [Moves all extremities] : moves all extremities [No Focal Deficits] : no focal deficits [Normal Speech] : normal speech [Alert and Oriented] : alert and oriented [de-identified] : RUE fistula

## 2024-09-09 ENCOUNTER — APPOINTMENT (OUTPATIENT)
Dept: CARDIOLOGY | Facility: CLINIC | Age: 59
End: 2024-09-09

## 2024-09-09 DIAGNOSIS — R00.1 BRADYCARDIA, UNSPECIFIED: ICD-10-CM

## 2024-09-09 PROCEDURE — 93298 REM INTERROG DEV EVAL SCRMS: CPT

## 2024-09-10 ENCOUNTER — RX RENEWAL (OUTPATIENT)
Age: 59
End: 2024-09-10

## 2024-10-09 ENCOUNTER — RX RENEWAL (OUTPATIENT)
Age: 59
End: 2024-10-09

## 2024-10-14 ENCOUNTER — APPOINTMENT (OUTPATIENT)
Dept: CARDIOLOGY | Facility: CLINIC | Age: 59
End: 2024-10-14
Payer: MEDICARE

## 2024-10-14 PROCEDURE — 93298 REM INTERROG DEV EVAL SCRMS: CPT

## 2024-10-15 ENCOUNTER — APPOINTMENT (OUTPATIENT)
Dept: ELECTROPHYSIOLOGY | Facility: CLINIC | Age: 59
End: 2024-10-15
Payer: MEDICAID

## 2024-10-15 VITALS
OXYGEN SATURATION: 91 % | BODY MASS INDEX: 33.6 KG/M2 | HEIGHT: 71 IN | WEIGHT: 240 LBS | SYSTOLIC BLOOD PRESSURE: 170 MMHG | HEART RATE: 73 BPM | DIASTOLIC BLOOD PRESSURE: 70 MMHG

## 2024-10-15 VITALS — DIASTOLIC BLOOD PRESSURE: 82 MMHG | SYSTOLIC BLOOD PRESSURE: 162 MMHG

## 2024-10-15 DIAGNOSIS — I44.1 ATRIOVENTRICULAR BLOCK, SECOND DEGREE: ICD-10-CM

## 2024-10-15 DIAGNOSIS — Z95.818 PRESENCE OF OTHER CARDIAC IMPLANTS AND GRAFTS: ICD-10-CM

## 2024-10-15 DIAGNOSIS — I31.39 OTHER PERICARDIAL EFFUSION (NONINFLAMMATORY): ICD-10-CM

## 2024-10-15 DIAGNOSIS — R00.1 BRADYCARDIA, UNSPECIFIED: ICD-10-CM

## 2024-10-15 PROCEDURE — 99204 OFFICE O/P NEW MOD 45 MIN: CPT | Mod: 25

## 2024-10-15 PROCEDURE — 93000 ELECTROCARDIOGRAM COMPLETE: CPT | Mod: 59

## 2024-10-15 RX ORDER — AMLODIPINE BESYLATE 10 MG/1
10 TABLET ORAL DAILY
Refills: 0 | Status: ACTIVE | COMMUNITY

## 2024-10-24 ENCOUNTER — APPOINTMENT (OUTPATIENT)
Dept: INFECTIOUS DISEASE | Facility: CLINIC | Age: 59
End: 2024-10-24

## 2024-10-24 VITALS
HEIGHT: 71 IN | OXYGEN SATURATION: 91 % | HEART RATE: 70 BPM | SYSTOLIC BLOOD PRESSURE: 158 MMHG | TEMPERATURE: 98.1 F | WEIGHT: 240 LBS | DIASTOLIC BLOOD PRESSURE: 79 MMHG | BODY MASS INDEX: 33.6 KG/M2 | RESPIRATION RATE: 16 BRPM

## 2024-10-24 PROCEDURE — 90679 RSV VACC PREF RECOMB ADJT IM: CPT

## 2024-10-24 PROCEDURE — 90472 IMMUNIZATION ADMIN EACH ADD: CPT

## 2024-10-24 PROCEDURE — G0009: CPT

## 2024-10-24 PROCEDURE — 99215 OFFICE O/P EST HI 40 MIN: CPT | Mod: 25

## 2024-10-24 PROCEDURE — 90677 PCV20 VACCINE IM: CPT

## 2024-11-05 ENCOUNTER — OFFICE (OUTPATIENT)
Dept: URBAN - METROPOLITAN AREA CLINIC 6 | Facility: CLINIC | Age: 59
Setting detail: OPHTHALMOLOGY
End: 2024-11-05
Payer: COMMERCIAL

## 2024-11-05 DIAGNOSIS — H02.831: ICD-10-CM

## 2024-11-05 DIAGNOSIS — H02.834: ICD-10-CM

## 2024-11-05 DIAGNOSIS — H01.001: ICD-10-CM

## 2024-11-05 DIAGNOSIS — H40.1133: ICD-10-CM

## 2024-11-05 DIAGNOSIS — H01.004: ICD-10-CM

## 2024-11-05 DIAGNOSIS — H25.13: ICD-10-CM

## 2024-11-05 PROCEDURE — 99214 OFFICE O/P EST MOD 30 MIN: CPT | Performed by: OPHTHALMOLOGY

## 2024-11-05 PROCEDURE — 92083 EXTENDED VISUAL FIELD XM: CPT | Performed by: OPHTHALMOLOGY

## 2024-11-05 ASSESSMENT — LID POSITION - DERMATOCHALASIS
OD_DERMATOCHALASIS: RUL 1+
OS_DERMATOCHALASIS: LUL 1+

## 2024-11-05 ASSESSMENT — REFRACTION_MANIFEST
OD_AXIS: 022
OS_VA1: 20/NI
OD_CYLINDER: -0.75
OS_CYLINDER: -2.00
OS_AXIS: 165
OS_SPHERE: +1.00
OD_AXIS: 25
OS_CYLINDER: -2.00
OD_SPHERE: +0.75
OD_VA1: 20/NI
OD_ADD: +2.50
OD_SPHERE: +0.75
OS_AXIS: 166
OS_SPHERE: +1.00
OD_CYLINDER: -0.75
OS_ADD: +2.50

## 2024-11-05 ASSESSMENT — KERATOMETRY
METHOD_AUTO_MANUAL: AUTO
OS_K1POWER_DIOPTERS: 42.50
OD_K1POWER_DIOPTERS: 43.00
OD_K2POWER_DIOPTERS: 44.00
OD_AXISANGLE_DEGREES: 119
OS_K2POWER_DIOPTERS: 43.50
OS_AXISANGLE_DEGREES: 063

## 2024-11-05 ASSESSMENT — PACHYMETRY
OS_CT_CORRECTION: 1
OD_CT_UM: 539
OD_CT_CORRECTION: 1
OS_CT_UM: 537

## 2024-11-05 ASSESSMENT — REFRACTION_AUTOREFRACTION
OD_SPHERE: +1.50
OS_SPHERE: +1.50
OD_CYLINDER: -1.75
OS_AXIS: 166
OD_AXIS: 010
OS_CYLINDER: -1.50

## 2024-11-05 ASSESSMENT — TONOMETRY
OD_IOP_MMHG: 14
OS_IOP_MMHG: 14

## 2024-11-05 ASSESSMENT — REFRACTION_CURRENTRX
OS_OVR_VA: 20/
OD_OVR_VA: 20/

## 2024-11-05 ASSESSMENT — CONFRONTATIONAL VISUAL FIELD TEST (CVF)
OD_FINDINGS: FULL
OS_FINDINGS: FULL

## 2024-11-05 ASSESSMENT — LID EXAM ASSESSMENTS
OS_BLEPHARITIS: LUL 1+ 2+
OD_BLEPHARITIS: RUL 1+ 2+

## 2024-11-05 ASSESSMENT — VISUAL ACUITY
OD_BCVA: 20/50-
OS_BCVA: 20/40-2

## 2025-05-06 NOTE — HISTORY OF PRESENT ILLNESS
Physical Therapy Visit    Visit Type: Daily Treatment Note  Visit: 14  Referring Provider: Nataliya Wharton PA-C  Medical Diagnosis (from order): M25.562 - Left knee pain, unspecified chronicity     Diagnosis Precautions: 15 pound lift to waist, 5 pound lift to shoulder, 2 pound lift overhead.    SUBJECTIVE                                                                                                               Patient reported she is scheduled to see orthopedics next month. Patient reported her lifting restrictions have been progressed. Patient reported she was instructed to get stronger now. Patient reported feeling like she gets tired towards the end of her shift when doing activities, particularly repeatedly, with her upper extremity.       OBJECTIVE                                                                                                                     Range of Motion (ROM)   (degrees unless noted; active unless noted; norms in ( ); negative=lacking to 0, positive=beyond 0)  Shoulder:    - Flexion (180):         Left:171     Passive: 171    - Extension (50):         Left: 40   Passive: 40    - Abduction (180):         Left: 155   Passive: 165    - Internal Rotation:        - at 45°:             Left:   Passive: 95        - Behind Back:            Left: T7    - External Rotation:       - at 0°:             Left: 72       - at 45°:             Left: Passive: 76    Strength  (out of 5 unless noted, standard test position unless noted)   Shoulder:     - Flexion:          Left: 4-          Right: 5     - Abduction:         Left: 4-         Right: 5    - Internal Rotation:           Left (at 0°): 5         Right (at 0°): 5    - External Rotation:          Left (at 0°): 4-         Right (at 0°): 5                        Treatment     Therapeutic Exercise  - Standing Shoulder Scaption, flexion, abduction; 1 pound dumbbell, 60-90 degree Range of Motion, 2x5, cuing to prevent shoulder shrugging  - Counter  Push Up, no more than 90 degree horizontal abduction, x5  - Bent over row, 10 pounds, x10, cuing for less biceps and more triceps/posterior shoulder muscle contraction  - Seated Bicep Curls Supinated with Dumbbells, 5 pounds x10, 10 pounds x3 (too heavy). Patient tried in standing, but felt with heavier weight seated was more comfortable.  - Shoulder External Rotation and Scapular Retraction with Resistance, theratube level 3, mid-range of motion cuing, 60 seconds time under load.   Patient was instructed on anticipating turnarounds to minimize chance of aggravating shoulder with strengthening exercises. Patient was instructed with updated Home exercise program, with printout being given and recommendations to track at least one strengthening exercise for weight/sets/repetitions to see if she is progressing.     Skilled input: as detailed above    Writer verbally educated and received verbal consent for hand placement, positioning of patient, and techniques to be performed today from patient for therapist position for techniques as described above and how they are pertinent to the patient's plan of care.  Home Exercise Program  Access Code: [email or web address]  URL: [email or web address]  Date: 05/06/2025  Prepared by: Eliud Lee    Exercises  - Standing Shoulder Abduction Slides at Wall (Mirrored)  - 1 x daily - 10-20 reps - 5-10 hold  - Shoulder Flexion  - 1 x daily - 1 sets - 10-20 reps - 5-10 hold  - Supine Shoulder External Rotation in 45 Degrees Abduction AAROM with Dowel  - 1 x daily - 10-20 reps - 5-10 hold  - Single Arm Doorway Pec Stretch at 90 Degrees Abduction  - 1 x daily - 10-20 reps - 5-10 hold  - Sleeper Stretch  - 1 x daily - 10 reps - 1-10 hold  - Standing Sleeper Stretch at Wall  - 1 x daily - 10 reps - 1-10 hold  - Standing Shoulder Scaption with Dumbbells  - 7-3 x weekly - 1-2 sets - 10-30 reps  - Push-Up on Counter  - 7-3 x weekly - 1-2 sets - 10-30 reps  - Bent over row  - 7-3  [FreeTextEntry1] : Patient accompanied by his sister, Carissa, during appointment today\par PCP: Josiah Ward\par Cardiology: Tomas Tran\par Endocrinology: Rebecca Cabrera ([phone])\par Neurology: Dakota Chawla\par Social Hx: Born in [place]. Never smoker. Denies alcohol or drug use\par Fam. Hx: Denies family hx of kidney disease.\par PMH: hypertrophic cardiomyopathy, Left ventricular outflow tract obstruction, first-degree heart block, lower extremity edema, DM 2 (dx 2 years ago), HTN, seizure disorder, HLD, GERD\par Echocardiogram June 1, 2020 pressures left ventricle normal size and function with ejection fraction of 60-65%. Moderate concentric LVH noted. Moderate mitral regurgitation is noted which is a bit worse than his prior echo.\par Patient referred to our practice for worsening renal function. He has not seen a Nephrologist before. He feels well today but he has noticed worsening lower extremity swelling over the past two months. He was previously on Lasix but ran out of the medication and stopped taking it for a while. He was directed by Cardiologist to restart Lasix 40 mg BID at the end of June. Cardiologist also ordered lower extremity venous Doppler to rule out DVT - no evidence of DVT. Chest x-ray was done due to diminished breath sounds on the right - small right effusion. Patient states that he does not exercise at all. He denies dizziness, lightheadedness, chest pain, palpitations, syncope, dyspnea today.\par Patient follows with Endo. for DM control . Checks blood sugar levels three times a day, he said sometimes numbers go into 200's. He is unsure what is last hgb A1c level is. Patient said he has a history of hospitalizations due to diabetes.\par [place] [facility] [place]; f/u by me within 24 hours of discharge for appointment today; medications reviewed; currently off valsartan ; lasix 40mg daily changed to torsemide 5mg daily; cardiology increased on phone to 10mg daily; worsening LE edema;\par  Pt seen/examined for close follow up. Saw Dr. Tran on Friday. Medications reviewed; still taking hydralazine. BP acceptable. Legs wrapped; still edematous; reports feeling better. Labs reviewed in detail with patient and sister Carissa. \par  Pt seen/examined post renal biopsy; showing 40% IFTA; severe arteriosclerosis; KW nodules; consistent with diabetic nephropathy; on torsemide 20mg BID; amlodipine 5mg daily and doxazosin 4mg daily;\par Pt seen/examined; medications reconciled at length; taking both torsemide BID and furosemide; stopped furosemide; amlodipine stlil at 5mg; should be 10\par pt seen/examined ; Cr worsening;\par \par Current: Pt seen/examined; recently hospitalized at [facility] ; started on HD; tunneled catheter; seeing Dr Celina Narvaez for AVF; missed clearance appt this AM with PMD; x weekly - 1-2 sets - 10-30 reps  - Seated Bicep Curls Supinated with Dumbbells  - 7-3 x weekly - 1-2 sets - 10-30 reps  - Shoulder External Rotation and Scapular Retraction with Resistance  - 7-3 x weekly - 1-2 sets - 10-30 reps      ASSESSMENT                                                                                                            Patient was able to control shoulder shrug sign and get closer to fatigue. Patient is still weak across the glenohumeral joint, which I suspect is from the tissues still continuing to heal after surgery, particularly the rotator cuff. Patient will require physical therapy services to address these strength deficits in the long term.   Education:   - Results of above outlined education: Verbalizes understanding and Needs reinforcement    PLAN                                                                                                                           Suggestions for next session as indicated: Patient has a hard visit limit per insurance until the end of the year 2025. After this visit today, she has 5 remaining. To conserve visits, plan is to follow up in 2 weeks to see how strengthening is going. I am anticipating working with patient on increasing strengthening stimuli intensity to ensure patient continues to progress with her strength safely and without shrug sign. Possible addition of exercise overhead may also be implemented (like overhead press)       Therapy procedure time and total treatment time can be found documented on the Time Entry flowsheet

## 2025-06-12 NOTE — PHYSICAL EXAM
[General Appearance - In No Acute Distress] : no acute distress [Normal Conjunctiva] : the conjunctiva exhibited no abnormalities [Normal Oral Mucosa] : normal oral mucosa [Normal Oropharynx] : normal oropharynx [Auscultation Breath Sounds / Voice Sounds] : lungs were clear to auscultation bilaterally [Normal Rate] : normal [Rhythm Regular] : regular [Normal S1] : normal S1 [Normal S2] : normal S2 [S4] : an S4 was heard [II] : a grade 2 [Abdomen Soft] : soft [Abdomen Tenderness] : non-tender [Abnormal Walk] : normal gait [Skin Color & Pigmentation] : normal skin color and pigmentation [Oriented To Time, Place, And Person] : oriented to person, place, and time [Affect] : the affect was normal No [FreeTextEntry1] : No JVD, no carotid bruits. [S3] : no S3

## 2025-06-20 NOTE — ASU DISCHARGE PLAN (ADULT/PEDIATRIC) - PATIENT BELONGINGS
Duration Of Freeze Thaw-Cycle (Seconds): 0 Post-Care Instructions: I reviewed with the patient in detail post-care instructions. Patient is to wear sun protection, and avoid picking at any of the treated lesions. Pt may apply ointment and a bandage to crusted or scabbing areas. Written wound care instructions were given to the patient. Detail Level: Detailed Render Post-Care Instructions In Note?: no Show Aperture Variable?: Yes Consent: The patient's consent was obtained including but not limited to risks of crusting, scabbing, blistering, scarring, darker or lighter pigmentary change, recurrence, incomplete removal and infection. Patient's belongings returned